# Patient Record
Sex: MALE | Race: WHITE
[De-identification: names, ages, dates, MRNs, and addresses within clinical notes are randomized per-mention and may not be internally consistent; named-entity substitution may affect disease eponyms.]

---

## 2018-02-26 ENCOUNTER — HOSPITAL ENCOUNTER (INPATIENT)
Dept: HOSPITAL 92 - ERS | Age: 73
LOS: 15 days | Discharge: NURSING, REHAB OR LONG-TERM CARE | DRG: 853 | End: 2018-03-13
Attending: INTERNAL MEDICINE | Admitting: INTERNAL MEDICINE
Payer: MEDICARE

## 2018-02-26 VITALS — BODY MASS INDEX: 38.8 KG/M2

## 2018-02-26 DIAGNOSIS — R31.29: ICD-10-CM

## 2018-02-26 DIAGNOSIS — N28.1: ICD-10-CM

## 2018-02-26 DIAGNOSIS — I11.0: ICD-10-CM

## 2018-02-26 DIAGNOSIS — J96.22: ICD-10-CM
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
## 2018-02-26 DIAGNOSIS — I25.10: ICD-10-CM

## 2018-02-26 DIAGNOSIS — J44.0: ICD-10-CM

## 2018-02-26 DIAGNOSIS — K29.60: ICD-10-CM

## 2018-02-26 DIAGNOSIS — I49.8: ICD-10-CM

## 2018-02-26 DIAGNOSIS — J18.9: ICD-10-CM

## 2018-02-26 DIAGNOSIS — E66.01: ICD-10-CM

## 2018-02-26 DIAGNOSIS — Z79.01: ICD-10-CM

## 2018-02-26 DIAGNOSIS — I49.5: ICD-10-CM

## 2018-02-26 DIAGNOSIS — J96.21: ICD-10-CM

## 2018-02-26 DIAGNOSIS — I48.3: ICD-10-CM

## 2018-02-26 DIAGNOSIS — F17.210: ICD-10-CM

## 2018-02-26 DIAGNOSIS — E78.5: ICD-10-CM

## 2018-02-26 DIAGNOSIS — R65.20: ICD-10-CM

## 2018-02-26 DIAGNOSIS — I50.33: ICD-10-CM

## 2018-02-26 DIAGNOSIS — M16.0: ICD-10-CM

## 2018-02-26 DIAGNOSIS — Z95.5: ICD-10-CM

## 2018-02-26 DIAGNOSIS — K25.4: ICD-10-CM

## 2018-02-26 DIAGNOSIS — I95.89: ICD-10-CM

## 2018-02-26 DIAGNOSIS — A41.9: Primary | ICD-10-CM

## 2018-02-26 DIAGNOSIS — G47.33: ICD-10-CM

## 2018-02-26 DIAGNOSIS — Z79.82: ICD-10-CM
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
## 2018-02-26 DIAGNOSIS — Z91.81: ICD-10-CM

## 2018-02-26 DIAGNOSIS — D62: ICD-10-CM

## 2018-02-26 LAB
ALBUMIN SERPL BCG-MCNC: 3.5 G/DL (ref 3.4–4.8)
ALP SERPL-CCNC: 57 U/L (ref 40–150)
ALT SERPL W P-5'-P-CCNC: 36 U/L (ref 8–55)
ANALYZER IN CARDIO: (no result)
ANION GAP SERPL CALC-SCNC: 17 MMOL/L (ref 10–20)
AST SERPL-CCNC: 49 U/L (ref 5–34)
BASE EXCESS STD BLDA CALC-SCNC: 0.4 MEQ/L
BASOPHILS # BLD AUTO: 0 THOU/UL (ref 0–0.2)
BASOPHILS NFR BLD AUTO: 0.1 % (ref 0–1)
BILIRUB SERPL-MCNC: 1.5 MG/DL (ref 0.2–1.2)
BUN SERPL-MCNC: 27 MG/DL (ref 8.4–25.7)
CA-I BLDA-SCNC: 1.2 MMOL/L (ref 1.12–1.3)
CALCIUM SERPL-MCNC: 9 MG/DL (ref 7.8–10.44)
CHLORIDE SERPL-SCNC: 99 MMOL/L (ref 98–107)
CK MB SERPL-MCNC: 4.2 NG/ML (ref 0–6.6)
CK SERPL-CCNC: 330 U/L (ref 30–200)
CO2 SERPL-SCNC: 25 MMOL/L (ref 23–31)
CREAT CL PREDICTED SERPL C-G-VRATE: 0 ML/MIN (ref 70–130)
EOSINOPHIL # BLD AUTO: 0 THOU/UL (ref 0–0.7)
EOSINOPHIL NFR BLD AUTO: 0.2 % (ref 0–10)
GLOBULIN SER CALC-MCNC: 3.3 G/DL (ref 2.4–3.5)
GLUCOSE SERPL-MCNC: 127 MG/DL (ref 80–115)
HCO3 BLDA-SCNC: 26.5 MEQ/L (ref 22–26)
HCT VFR BLDA CALC: 51 % (ref 42–52)
HGB BLD-MCNC: 16.6 G/DL (ref 14–18)
HGB BLDA-MCNC: 16.1 G/DL (ref 14–18)
LYMPHOCYTES # BLD: 0.5 THOU/UL (ref 1.2–3.4)
LYMPHOCYTES NFR BLD AUTO: 5.8 % (ref 21–51)
MCH RBC QN AUTO: 31 PG (ref 27–31)
MCV RBC AUTO: 97.5 FL (ref 80–94)
MONOCYTES # BLD AUTO: 1 THOU/UL (ref 0.11–0.59)
MONOCYTES NFR BLD AUTO: 11.4 % (ref 0–10)
NEUTROPHILS # BLD AUTO: 7.5 THOU/UL (ref 1.4–6.5)
NEUTROPHILS NFR BLD AUTO: 82.6 % (ref 42–75)
O2 A-A PPRESDIFF RESPIRATORY: 222.2 MM[HG] (ref 0–20)
PCO2 BLDA: 47.6 MMHG (ref 35–45)
PH BLDA: 7.36 [PH] (ref 7.35–7.45)
PLATELET # BLD AUTO: 205 THOU/UL (ref 130–400)
PO2 BLDA: 74.8 MMHG (ref 80–100)
POTASSIUM SERPL-SCNC: 4.8 MMOL/L (ref 3.5–5.1)
RBC # BLD AUTO: 5.36 MILL/UL (ref 4.7–6.1)
SODIUM SERPL-SCNC: 136 MMOL/L (ref 136–145)
SPECIMEN DRAWN FROM PATIENT: (no result)
TROPONIN I SERPL DL<=0.01 NG/ML-MCNC: 0.05 NG/ML (ref ?–0.03)
TROPONIN I SERPL DL<=0.01 NG/ML-MCNC: 0.06 NG/ML (ref ?–0.03)
TROPONIN I SERPL DL<=0.01 NG/ML-MCNC: 0.06 NG/ML (ref ?–0.03)
WBC # BLD AUTO: 9 THOU/UL (ref 4.8–10.8)

## 2018-02-26 PROCEDURE — 81001 URINALYSIS AUTO W/SCOPE: CPT

## 2018-02-26 PROCEDURE — 80053 COMPREHEN METABOLIC PANEL: CPT

## 2018-02-26 PROCEDURE — 84443 ASSAY THYROID STIM HORMONE: CPT

## 2018-02-26 PROCEDURE — 86900 BLOOD TYPING SEROLOGIC ABO: CPT

## 2018-02-26 PROCEDURE — 96375 TX/PRO/DX INJ NEW DRUG ADDON: CPT

## 2018-02-26 PROCEDURE — 94640 AIRWAY INHALATION TREATMENT: CPT

## 2018-02-26 PROCEDURE — P9016 RBC LEUKOCYTES REDUCED: HCPCS

## 2018-02-26 PROCEDURE — 82085 ASSAY OF ALDOLASE: CPT

## 2018-02-26 PROCEDURE — 87633 RESP VIRUS 12-25 TARGETS: CPT

## 2018-02-26 PROCEDURE — 93005 ELECTROCARDIOGRAM TRACING: CPT

## 2018-02-26 PROCEDURE — 85730 THROMBOPLASTIN TIME PARTIAL: CPT

## 2018-02-26 PROCEDURE — 82553 CREATINE MB FRACTION: CPT

## 2018-02-26 PROCEDURE — 94003 VENT MGMT INPAT SUBQ DAY: CPT

## 2018-02-26 PROCEDURE — 83880 ASSAY OF NATRIURETIC PEPTIDE: CPT

## 2018-02-26 PROCEDURE — 82805 BLOOD GASES W/O2 SATURATION: CPT

## 2018-02-26 PROCEDURE — 82550 ASSAY OF CK (CPK): CPT

## 2018-02-26 PROCEDURE — 94760 N-INVAS EAR/PLS OXIMETRY 1: CPT

## 2018-02-26 PROCEDURE — 72170 X-RAY EXAM OF PELVIS: CPT

## 2018-02-26 PROCEDURE — C1769 GUIDE WIRE: HCPCS

## 2018-02-26 PROCEDURE — 93970 EXTREMITY STUDY: CPT

## 2018-02-26 PROCEDURE — 36005 INJECTION EXT VENOGRAPHY: CPT

## 2018-02-26 PROCEDURE — C1730 CATH, EP, 19 OR FEW ELECT: HCPCS

## 2018-02-26 PROCEDURE — 36430 TRANSFUSION BLD/BLD COMPNT: CPT

## 2018-02-26 PROCEDURE — C9113 INJ PANTOPRAZOLE SODIUM, VIA: HCPCS

## 2018-02-26 PROCEDURE — 80202 ASSAY OF VANCOMYCIN: CPT

## 2018-02-26 PROCEDURE — 83735 ASSAY OF MAGNESIUM: CPT

## 2018-02-26 PROCEDURE — 96374 THER/PROPH/DIAG INJ IV PUSH: CPT

## 2018-02-26 PROCEDURE — 36415 COLL VENOUS BLD VENIPUNCTURE: CPT

## 2018-02-26 PROCEDURE — 80048 BASIC METABOLIC PNL TOTAL CA: CPT

## 2018-02-26 PROCEDURE — 87899 AGENT NOS ASSAY W/OPTIC: CPT

## 2018-02-26 PROCEDURE — 93010 ELECTROCARDIOGRAM REPORT: CPT

## 2018-02-26 PROCEDURE — 33208 INSRT HEART PM ATRIAL & VENT: CPT

## 2018-02-26 PROCEDURE — 93653 COMPRE EP EVAL TX SVT: CPT

## 2018-02-26 PROCEDURE — 80076 HEPATIC FUNCTION PANEL: CPT

## 2018-02-26 PROCEDURE — C1785 PMKR, DUAL, RATE-RESP: HCPCS

## 2018-02-26 PROCEDURE — 74178 CT ABD&PLV WO CNTR FLWD CNTR: CPT

## 2018-02-26 PROCEDURE — 36416 COLLJ CAPILLARY BLOOD SPEC: CPT

## 2018-02-26 PROCEDURE — 86850 RBC ANTIBODY SCREEN: CPT

## 2018-02-26 PROCEDURE — 76705 ECHO EXAM OF ABDOMEN: CPT

## 2018-02-26 PROCEDURE — 94002 VENT MGMT INPAT INIT DAY: CPT

## 2018-02-26 PROCEDURE — 94660 CPAP INITIATION&MGMT: CPT

## 2018-02-26 PROCEDURE — 71045 X-RAY EXAM CHEST 1 VIEW: CPT

## 2018-02-26 PROCEDURE — 93613 INTRACARDIAC EPHYS 3D MAPG: CPT

## 2018-02-26 PROCEDURE — 75820 VEIN X-RAY ARM/LEG: CPT

## 2018-02-26 PROCEDURE — 87040 BLOOD CULTURE FOR BACTERIA: CPT

## 2018-02-26 PROCEDURE — A4216 STERILE WATER/SALINE, 10 ML: HCPCS

## 2018-02-26 PROCEDURE — C1898 LEAD, PMKR, OTHER THAN TRANS: HCPCS

## 2018-02-26 PROCEDURE — 86677 HELICOBACTER PYLORI ANTIBODY: CPT

## 2018-02-26 PROCEDURE — 87086 URINE CULTURE/COLONY COUNT: CPT

## 2018-02-26 PROCEDURE — 71275 CT ANGIOGRAPHY CHEST: CPT

## 2018-02-26 PROCEDURE — 86901 BLOOD TYPING SEROLOGIC RH(D): CPT

## 2018-02-26 PROCEDURE — 84484 ASSAY OF TROPONIN QUANT: CPT

## 2018-02-26 PROCEDURE — 85610 PROTHROMBIN TIME: CPT

## 2018-02-26 PROCEDURE — 85025 COMPLETE CBC W/AUTO DIFF WBC: CPT

## 2018-02-26 PROCEDURE — 72192 CT PELVIS W/O DYE: CPT

## 2018-02-26 PROCEDURE — 76942 ECHO GUIDE FOR BIOPSY: CPT

## 2018-02-26 PROCEDURE — 84100 ASSAY OF PHOSPHORUS: CPT

## 2018-02-26 PROCEDURE — 93306 TTE W/DOPPLER COMPLETE: CPT

## 2018-02-26 NOTE — CT
CT PELVIS WITHOUT CONTRAST:

 

Date:  02/26/18 

 

HISTORY:  

Severe arthritis in the hips. Fall. 

 

COMPARISON:  

None. 

 

FINDINGS:

There appears to be an abnormal soft tissue density in the inferior pole of the left kidney. There is
 some perinephric stranding. There is also abnormal edema retroperitoneal near the level of the aorti
c bifurcation to the iliac arteries. 

 

Fat-containing left-sided indirect inguinal hernia. Prostate is enlarged. 

 

Bilateral pars interarticular defects are present at L5 with Grade I anterolisthesis approximately 7.
0 mm. Moderate degenerative listhesis between the spinous processes of L4 and L5. Old deformity of th
e coccyx. 

 

There is severe degenerative change of both hip joints with some bridging heterotopic ossification an
d bridging osteophytes, likely limiting range of motion. There is sclerosis of both SI joints indicat
ing degenerative disease. There is also severe degenerative disease of the pubic symphysis. 

 

Mild atrophy of the right iliacus and iliopsoas, likely from tendon injury. 

 

IMPRESSION: 

1.  Abnormal soft tissue density inferior pole left kidney with perinephric stranding. There is also 
abnormal edema which is retroperitoneal near the aortic bifurcation to the iliac arteries. Dedicated 
abdomen and pelvis CT with renal protocol recommended. 

 

2.  Severe degenerative disease of both hips. Right worse than left, with bridging osteophyte formati
on of the right hip likely limiting patient's mobility. There is also severe degenerative disease of 
the left hip, although with only small bridging osteophytes. 

 

3.  Severe degenerative disease pubic symphysis. 

 

4.  Bilateral pars interarticularis defects L5 with Grade I anterolisthesis of approximately 7.0 mm. 


 

5.  Fat-containing left-sided direct inguinal hernia. 

 

6.  Atrophy of the right iliacus and iliopsoas muscles likely due to tendinous injury. 

 

7.  Marked prostatomegaly. 

 

 

POS: [facility]

## 2018-02-26 NOTE — HP
REASON FOR ADMISSION:  Acute respiratory failure with hypoxia, likely viral syndrome, possible pneumo
luis e.

 

HISTORY OF PRESENTING ILLNESS:  The patient gives history of getting progressively weak and congested
 from Saturday.  He states his wife had flu-like illness and thinks that he might have caught up with
 that.  On Saturday, patient fell in the house.  He could not get up as he was feeling very weak.  [facility] ended up calling 911 and EMT came and got him up to the couch.  He took a tablet of Norco and fell
 off the couch again in an hour and EMTs had to be called again.  They have suggested him to go to [facility] emergency room, but patient thought he will wait it out.  This morning as symptoms, especially the 
generalized weakness was getting worse, patient made it to the emergency room.  He states he has angelina
re arthritis in both his hips and uses crutches to walk from last 2 years.  He has not seen an orthop
edic specialist.  No current complaints of chest pain or palpitation.  He has not been able to lay fl
at.  On arrival, patient was brought on BiPAP here.  They have tried to wean him, but he is still on 
100% nonrebreather at present.

 

PAST MEDICAL AND SURGICAL HISTORY:  History of coronary artery disease with 3 stents placed 3 years b
ack by Dr. Fried, dyslipidemia, hypertension, and severe osteoarthritis in both hips.

 

CURRENT MEDICATIONS:  Patient takes carvedilol 6.25 mg twice daily, Plavix 75 mg daily, Norvasc 5 mg 
daily, simvastatin 20 mg p.o. at bedtime, lisinopril 20 mg twice daily, Lasix 20 mg daily, Norco p.r.
n. for pain, and aspirin 325 mg daily.

 

ALLERGIES:  No known drug allergies.

 

PERSONAL HISTORY:  Quit smoking 10 years ago, prior to which has smoked one pack a day for nearly 20 
years.  Does not abuse alcohol or drugs.  Lives with his wife.

 

FAMILY HISTORY:  Mother  of lymphoma and its complications at the age of 62 years.  Father  o
f heart failure and its complications at the age of 82 years.

 

REVIEW OF SYSTEMS:  The following complete review of systems was negative, unless otherwise mentioned
 in the HPI or below:

Constitutional:  Weight loss or gain, ability to conduct usual activities.

Skin:  Rash, itching.

Eyes:  Double vision, pain.

ENT/Mouth:  Nose bleeding, neck stiffness, pain, tenderness.

Cardiovascular:  Palpitations, dyspnea on exertion, orthopnea.

Respiratory:  Shortness of breath, wheezing, cough, hemoptysis, fever or night sweats.

Gastrointestinal:  Poor appetite, abdominal pain, heartburn, nausea, vomiting, constipation, or diarr
hea.

Genitourinary:  Urgency, frequency, dysuria, nocturia.

Musculoskeletal:  Pain, swelling.

Neurologic/Psychiatric:  Anxiety, depression.

Allergy/Immunologic:  Skin rash, bleeding tendency.

 

PHYSICAL EXAMINATION:

GENERAL:  The patient is a 72-year-old male who is currently on a nonrebreather and has orthopnea.

VITAL SIGNS:  Blood pressure 134/56, pulse 78 per minute, respiratory rate 24 per minute, temperature
 99.8 degrees Fahrenheit, saturating 98% on BiPAP on arrival, currently 95% on 100% nonrebreather.

ORAL CAVITY:  Mucous membranes are moist.  No exudates or congestion.

CARDIOVASCULAR SYSTEM:  S1, S2 heard.  Regular rhythm.

RESPIRATORY SYSTEM:  Air entry 1+ bilateral.  Scattered wheezes plus bilaterally.

ABDOMEN:  Soft, bowel sounds heard.  No tenderness, rigidity or guarding.

EXTREMITIES:  His lower extremities are disproportionate.  Patient states his left lower extremity is
 always bigger than the right.  He attributes this to severe osteoarthritis in his hips.  No calf ten
derness.

VASCULAR SYSTEM:  Peripheral pulses 1+ bilateral.  No ischemic ulcerations or gangrene.

CENTRAL NERVOUS SYSTEM:  No gross focal deficits seen.  The patient moves all 4 extremities, although
 he is extremely deconditioned and in fact cannot mobilize himself at present on the [facility].

PSYCHIATRIC SYSTEM:  No hallucinations or delusions.

 

LABORATORY DATA AND IMAGING DATA:  White count of 9, hemoglobin and hematocrit 16 and 52, platelet co
unt 205.  MCV is 97 with 82% neutrophils.  Blood gas done shows pH of 7.36, pCO2 47, pO2 74, bicarbon
ate is 26.  Electrolytes are stable.  BUN 27, creatinine 0.6, glucose 127, total bilirubin 1.5, AST 4
9, and ALT 36.  CK level is 330, CK-MB 4.2, troponin I 0.05.  BNP is 136.  Albumin is 3.5.  Chest x-r
ay done shows cardiomegaly with pulmonary vascular congestion.  EKG done shows bifascicular block wit
h normal sinus rhythm at 80 beats per minute.

 

CLINICAL IMPRESSION AND PLAN:  The patient will be admitted to [facility] for acute respiratory failure wit
h hypoxia.  Possible viral syndrome with his wife having similar symptoms, possible obstructive sleep
 apnea with hypoventilation syndrome.  He will be empirically placed on Levaquin and we will also farhana
ce him on Tamiflu until his viral PCR comes back.  He will also be on Solu-Medrol 40 mg IV q.6 hourly
 along with DuoNebs q.6 hourly.  We will obtain an echo with 2D Doppler for LV function and ultrasoun
d venous Doppler to rule out DVT in his lower extremities.  We will obtain a CT of his pelvis to see 
severity of his osteoarthritis as patient is using crutches and with his body weight of nearly 117 ki
lograms, likely he has been falling for the same reason.  We will confirm the same.  The patient is c
urrently on nonrebreather and we will place him back on BiPAP for now.  We will consult Dr. Charlie loera is on call for Pulmonology.

 

CODE STATUS:  FULL.

## 2018-02-26 NOTE — ULT
BILATERAL LOWER EXTREMITY VENOUS DUPLEX SONOGRAM:

 

History: Bilateral leg pain and swelling. 

 

FINDINGS: 

Each common femoral vein and greater saphenous junction are evaluated along with each femoral, deep f
emoral, popliteal, and posterior tibial vein. There is good color and spectral doppler flow, compress
ion and augmentation. 

 

IMPRESSION: 

No sonographic of DVT within either lower extremity.

 

POS: [facility]

## 2018-02-26 NOTE — RAD
PORTABLE CHEST:

 

HISTORY: 

Dyspnea.  

 

COMPARISON: 

No comparison.

 

FINDINGS: 

The heart is enlarged.  There is mild vascular engorgement.  No focal infiltrate.  Lung bases are poo
rly evaluated on this exam de to portable projection and overlying soft tissue attenuation.  If there
 is concern of pneumonia, recommend upright PA and lateral views of chest.

 

IMPRESSION: 

Cardiomegaly and mild vascular engorgement.

 

POS: [facility]

## 2018-02-26 NOTE — CON
DATE OF CONSULTATION:  2018

 

SERVICE:  Pulmonary Medicine.

 

REASON FOR CONSULTATION:  [facility] patient.

 

HISTORY OF PRESENT ILLNESS:  The patient is a 72-year-old white male with past 
medical history significant for increasing debility over a period of several 
years secondary to osteoarthritis.  He has severe weakness.  He is on multiple 
medications including narcotics.  In this setting, he suffered a mechanical 
fall yesterday.  EMS Services were contacted and brought to his house.  They 
helped him and watched him for a brief period of time.  They assessed him and 
ultimately, he was left at home.  The next day, he took Norco and inadvertently 
fell off his couch.  He was incapable of getting back up.  EMS Services once 
again came to his house and assessed him.  He came to the hospital because of 
the severe weakness.  He also had a little bit of a viral prodrome.  He was 
found to be a touch hypoxemic on presentation.  He was put on BiPAP briefly, 
but I really do not have any labs that supported the need for this.  He is 
quickly deescalated back down to a nonrebreather, Ventimask, and then 3 liters 
nasal cannula, where he is breathing comfortably.  He currently denies any 
fevers, chills, nausea, vomiting or shortness of breath.  He has no chest 
discomfort.  He is having a slight increasing cough for the past 2-3 days, 
productive of white slimy mucus.  He is not having any fevers or sputum of 
color.  He denies any nausea, vomiting, diarrhea, hot, red, swollen joints or 
new rashes.

 

PAST MEDICAL HISTORY:

1.  Coronary artery disease.

2.  Dyslipidemia.

3.  Hypertension.

4.  Osteoarthritis, severe.

 

ALLERGIES:  No known drug allergies.

 

MEDICATIONS:  List of his inpatient medications were reviewed.  No specific 
updates were made at this time.

 

FAMILY HISTORY:  Noncontributory.

 

SOCIAL HISTORY:  He has a greater than 30-pack-year history of smoking.  He 
quit 10 years ago.  He denies any alcohol or illicit drugs.  He currently lives 
with his wife and has no exposure to chemicals, dust, asbestosis or 
tuberculosis.  He does not have any known lung disease.

 

REVIEW OF SYSTEMS:  General, head, ears, eyes, nose, throat, cardiovascular, 
respiratory, GI, , musculoskeletal, neurologic and skin is negative except as 
mentioned in the HPI.

 

PHYSICAL EXAMINATION:

VITAL SIGNS:  Afebrile, pulse 51, respirations 14, saturation 95% on 3 liters 
nasal cannula, blood pressure 148/76.

GENERAL:  The patient is awake and alert.  He has minimal respiratory distress, 
but tells me that he is breathing comfortably.

HEENT:  Normocephalic, atraumatic.  Sclerae are white, conjunctivae pink.  Oral 
and nasal mucosa is moist without lesions.

LUNGS:  Decent air entry bilaterally with no prolonged expiratory phase or 
wheezing.  Dependent crackles are present in the bibasilar regions.

HEART:  Bradycardic.  Regular.

ABDOMEN:  Soft, nontender, nondistended.  Bowel sounds are positive.

MUSCULOSKELETAL:  No cyanosis or clubbing.  There is 1-2+ pitting in the right 
lower extremity and no pitting in the left lower extremity.  Chronic stasis 
changes are present.  There is asymmetry to the size of the calf with the right 
calf being much larger.

 

LABORATORY DATA:  A pH 7.36, pCO2 of 47.6, pO2 of 75 on BiPAP at 12/7 at that 
time with an FiO2 of 50%.  WBC 9.0, hemoglobin 16.6, platelets 205,000.  
Neutrophil count is 83%.  Basic metabolic profile is unremarkable.  Total 
bilirubin 1.5.  Liver function studies are otherwise unremarkable.  , 
troponin 0.064.

 

IMAGIN.  CT of the pelvis demonstrates a renal lesion in the pole of the left kidney 
with perinephric stranding.  There is also a retroperitoneal edema.  
Degenerative changes of both hips are present.  Prostatomegaly is present.

2.  Chest x-ray demonstrates pulmonary vascular congestion.  There is 
cardiomegaly.  Otherwise, there is no acute pleural parenchymal lung disease 
present.

 

ASSESSMENT:

1.  Acute hypoxic respiratory failure, resolving.

2.  Acute on chronic heart failure, suspected.

3.  Debility, chronic.

4.  Osteoarthritis, severe.

 

PLAN:  Agree with ultrasound of the heart and leg.  He is currently stable for 
transition out of the [facility] to the medical unit.  I will deescalate his 
methylprednisolone to once daily dosing.  I will consult physical therapy and 
case management because placement may be an issue moving forward.  He would 
likely benefit from a stay in a rehab center, or skilled nursing facility to 
pursue aggressive physical therapy.  Cough suppressants will be interrupted.  
Otherwise, supportive management will be continued  Pulmonary Critical Care 
will continue to follow while the patient remains in this location, but 
hopefully, he will be able to transition to the floor in short order.

 

70 minutes have been devoted to this patient in various activities.  I 
personally reviewed all imaging studies and laboratory data noted within this 
document.  For greater than fifty percent of this time, I was interacting with 
the patient at the bedside or coordinating care with the care team.  For the 
remainder of the time I was immediately available to the patient in the 
hospital unit.  



ALLIE

## 2018-02-27 LAB
ALBUMIN SERPL BCG-MCNC: 3.2 G/DL (ref 3.4–4.8)
ALP SERPL-CCNC: 68 U/L (ref 40–150)
ALT SERPL W P-5'-P-CCNC: 38 U/L (ref 8–55)
ANALYZER IN CARDIO: (no result)
ANION GAP SERPL CALC-SCNC: 12 MMOL/L (ref 10–20)
AST SERPL-CCNC: 26 U/L (ref 5–34)
BASE EXCESS STD BLDA CALC-SCNC: 3.8 MEQ/L
BILIRUB DIRECT SERPL-MCNC: 1.1 MG/DL (ref 0.1–0.3)
BILIRUB SERPL-MCNC: 1.6 MG/DL (ref 0.2–1.2)
BUN SERPL-MCNC: 28 MG/DL (ref 8.4–25.7)
CA-I BLDA-SCNC: 1.2 MMOL/L (ref 1.12–1.3)
CALCIUM SERPL-MCNC: 8.8 MG/DL (ref 7.8–10.44)
CHLORIDE SERPL-SCNC: 97 MMOL/L (ref 98–107)
CO2 SERPL-SCNC: 30 MMOL/L (ref 23–31)
CREAT CL PREDICTED SERPL C-G-VRATE: 171 ML/MIN (ref 70–130)
GLUCOSE SERPL-MCNC: 135 MG/DL (ref 80–115)
HCO3 BLDA-SCNC: 30.1 MEQ/L (ref 22–26)
HCT VFR BLDA CALC: 52.9 % (ref 42–52)
HGB BLD-MCNC: 14.9 G/DL (ref 14–18)
HGB BLDA-MCNC: 15.9 G/DL (ref 14–18)
MAGNESIUM SERPL-MCNC: 2 MG/DL (ref 1.6–2.6)
MCH RBC QN AUTO: 31.1 PG (ref 27–31)
MCV RBC AUTO: 97.4 FL (ref 80–94)
MDIFF COMPLETE?: YES
O2 A-A PPRESDIFF RESPIRATORY: 162.53 MM[HG] (ref 0–20)
PCO2 BLDA: 51.1 MMHG (ref 35–45)
PH BLDA: 7.39 [PH] (ref 7.35–7.45)
PLATELET # BLD AUTO: 192 THOU/UL (ref 130–400)
PLATELET BLD QL SMEAR: (no result)
PO2 BLDA: 56.8 MMHG (ref 80–100)
POTASSIUM SERPL-SCNC: 4 MMOL/L (ref 3.5–5.1)
RBC # BLD AUTO: 4.81 MILL/UL (ref 4.7–6.1)
SODIUM SERPL-SCNC: 135 MMOL/L (ref 136–145)
SPECIMEN DRAWN FROM PATIENT: (no result)
WBC # BLD AUTO: 9.4 THOU/UL (ref 4.8–10.8)

## 2018-02-27 RX ADMIN — ASPIRIN SCH MG: 81 TABLET ORAL at 09:20

## 2018-02-27 NOTE — PQF
CLINICAL DOCUMENTATION IMPROVEMENT CLARIFICATION FORM:  ICD-10 Updated



PLEASE DO AN ADDENDUM TO THE PROGRESS NOTE WITH ANY DOCUMENTATION UPDATES OR 
ADDITIONS AND CARRY THROUGH TO DC SUMMARY.   THANK YOU.



DATE:   2/27                                                                   
             ATTN:  DR. TARAH MARTIN



Please exercise your independent, professional judgment in responding to the 
clarification form. Clinical indicators are provided on the bottom of this form 
for your review.



Please check appropriate box(s):



BMI > 40 with associated diagnosis of: (check one)

         [ x ] Morbid (Severe) Obesity

         [  ] Overweight 

         [  ] Obesity 

[  ] Other diagnosis ___________

[  ] Unable to determine



For continuity of documentation, please document condition throughout progress 
notes and discharge summary.  Thank You.



BMI < 19   Under weight

19    - 24.9   Healthy

25.0 - 29.9   Slightly Overweight

30.0 - 34.9   Obese

35.0 - 39.9   Severely Obese

40.0 and Over   Morbidly Obese



CLINICAL INDICATORS - SIGNS / SYMPTOMS / LABS



BMI:  40.9



RISKS:

SEVERE DECONDITIONING WITH SEVERE OA B HIPS

RECURRENT FALLS

POSSIBLE OHS



TREATMENTS:

 DIET

ADDITIONAL STAFF FOR TURNING & ADL ASSISTANCE





THANK YOU!  Jannie



(This form is maintained as a part of the permanent medical record)

 2015 [employer].  All Rights Reserved

Jannie Corrales RN, BSN    [email or web address]    Office:  [phone]

                                                              

[facility]

## 2018-02-27 NOTE — PDOC.PN
- Subjective


Encounter Start Date: 02/27/18


Encounter Start Time: 11:40


Subjective: still sob, is not able to bring up sputum


-: wife at bedside


-: is moving all extremities





- Objective


Resuscitation Status: 


 











Resuscitation Status           FULL:Full Resuscitation














MAR Reviewed: Yes


Vital Signs & Weight: 


 Vital Signs (12 hours)











  Temp Pulse Pulse Pulse Resp BP BP


 


 02/27/18 09:24       161/85 H 


 


 02/27/18 08:47    81  74    186/79 H


 


 02/27/18 08:00  98.9 F  78    20  


 


 02/27/18 06:26   55 L    18  


 


 02/27/18 04:00  99.1 F  57 L    20  














  BP BP Pulse Ox Pulse Ox Pulse Ox


 


 02/27/18 09:24     


 


 02/27/18 08:47  193/94 H    90 L  91 L


 


 02/27/18 08:00   180/74 H  91 L  


 


 02/27/18 06:26     


 


 02/27/18 04:00   159/79 H  92 L  








 Weight











Admit Weight                   260 lb


 


Weight                         261 lb 1.6 oz














I&O: 


 











 02/26/18 02/27/18 02/28/18





 06:59 06:59 06:59


 


Intake Total  420 


 


Output Total  380 


 


Balance  40 











Result Diagrams: 


 02/27/18 03:32





 02/27/18 03:32





Phys Exam





- Physical Examination


HEENT: PERRLA, sclera anicteric


Neck: no JVD, supple


Respiratory: no wheezing


rhonchi++


Cardiovascular: RRR, no significant murmur


Gastrointestinal: soft, non-tender, positive bowel sounds


Musculoskeletal: no edema, pulses present


Neurological: non-focal, moves all 4 limbs





Dx/Plan


(1) Acute respiratory failure with hypoxia


Code(s): J96.01 - ACUTE RESPIRATORY FAILURE WITH HYPOXIA   Status: Acute   





(2) COPD exacerbation


Code(s): J44.1 - CHRONIC OBSTRUCTIVE PULMONARY DISEASE W (ACUTE) EXACERBATION   

Status: Acute   





(3) Morbid obesity


Code(s): E66.01 - MORBID (SEVERE) OBESITY DUE TO EXCESS CALORIES   Status: 

Acute   





(4) WILMA (obstructive sleep apnea)


Code(s): G47.33 - OBSTRUCTIVE SLEEP APNEA (ADULT) (PEDIATRIC)   Status: 

Suspected   





(5) Obesity hypoventilation syndrome


Code(s): E66.2 - MORBID (SEVERE) OBESITY WITH ALVEOLAR HYPOVENTILATION   Status

: Suspected   





(6) Osteoarthritis


Code(s): M19.90 - UNSPECIFIED OSTEOARTHRITIS, UNSPECIFIED SITE   Status: 

Chronic   


Qualifiers: 


   Osteoarthritis location: hip   Osteoarthritis type: primary   Laterality: 

bilateral   Qualified Code(s): M16.0 - Bilateral primary osteoarthritis of hip 

  





(7) HTN (hypertension)


Code(s): I10 - ESSENTIAL (PRIMARY) HYPERTENSION   Status: Chronic   


Qualifiers: 


   Hypertension type: essential hypertension   Qualified Code(s): I10 - 

Essential (primary) hypertension   





- Plan


is on levaquin, steroids, nebs


-: will get neuro w/u if he remains deconditioned/aspiration symptoms


-: has severe OA of hips, atrophy of ileopsoas and iliacus as well on CT


-: will likely need rehab eval if he recovers


-: May tx if he is good enough to clear oral/resp secretions if not prefer him





* .


staying in imcu.


is on oral lasix, encourage po intake


will f/u


viral pcr is -ve for resp pathogens





Review of Systems





- Medications/Allergies


Allergies/Adverse Reactions: 


 Allergies











Allergy/AdvReac Type Severity Reaction Status Date / Time


 


No Known Drug Allergies Allergy   Verified 02/26/18 14:53











Medications: 


 Current Medications





Acetaminophen (Tylenol)  650 mg PO Q4H PRN


   PRN Reason: Headache/Fever or Pain


Albuterol/Ipratropium (Duoneb)  3 ml NEB [id number] [facility]


   Last Admin: 02/27/18 06:26 Dose:  3 ml


Aspirin (Ecotrin)  81 mg PO DAILY [facility]


   Last Admin: 02/27/18 09:20 Dose:  81 mg


Carvedilol (Coreg)  6.25 mg PO BID-WM [facility]


   Last Admin: 02/27/18 09:24 Dose:  6.25 mg


Docusate Sodium (Colace)  100 mg PO BID [facility]


   Last Admin: 02/27/18 09:20 Dose:  100 mg


Enoxaparin Sodium (Lovenox)  40 mg SC 0900 [facility]


   Last Admin: 02/27/18 09:19 Dose:  40 mg


Furosemide (Lasix)  40 mg PO DAILY-AC [facility]


   Last Admin: 02/27/18 09:20 Dose:  40 mg


Guaifenesin (Mucinex)  1,200 mg PO Q12HR [facility]


   Last Admin: 02/27/18 09:19 Dose:  1,200 mg


Levofloxacin 500 mg/ Device  100 mls @ 100 mls/hr IVPB 1500 [facility]


   Last Admin: 02/26/18 18:28 Dose:  Not Given


Prednisone (Prednisone)  40 mg PO QAM-WM [facility]


   Stop: 03/02/18 08:01


   Last Admin: 02/27/18 09:20 Dose:  40 mg

## 2018-02-27 NOTE — PRG
DATE OF SERVICE:  02/27/2018

 

SERVICE:  Pulmonary Medicine.

 

INTERVAL HISTORY:  The patient was doing really quite well until this afternoon when he developed a t
achyarrhythmia.  At that point, he had increasing oxygen requirements and was diaphoretic once again.
  He did not have any chest discomfort, but he had increasing mentation issues.  His blood pressure r
emained stable otherwise.  We will repeat an ABG, which was essentially unremarkable.  He ended up go
ing down for an urgent CT of the chest to make sure no pulmonary embolus existed.  Otherwise, there w
as no interval change to his condition.  He is back on BiPAP at this time.  After the tachyarrhythmia
 became a little bit more typical, we repeated an EKG demonstrating characteristic changes consistent
 with atrial flutter.

 

PHYSICAL EXAMINATION:

VITAL SIGNS:  Afebrile, pulse 135, blood pressure 136/95, respirations 35, saturation 97% on BiPAP cu
rrently.

GENERAL:  The patient is somnolent, but wakes up and follows some simple commands with very little st
imulation.

HEENT:  Normocephalic, atraumatic.  Sclerae are white, conjunctivae pink.  Oral and nasal mucosa is m
oist without lesions.

LUNGS:  Decent air entry.  There is a prolonged expiratory phase with a little bit of wheezing and cr
ackles both present.  No rhonchi.

HEART:  Tachycardic.  Regular.

ABDOMEN:  Soft, nontender, nondistended.  Bowel sounds are positive.

MUSCULOSKELETAL:  No cyanosis or clubbing.  There is trace to 1+ pitting in the bilateral lower extre
mities.

GENITOURINARY:  No Sommer.

NEUROLOGIC:  Grossly nonfocal.

 

LABORATORY DATA:  WBC 9.4, hemoglobin 14.9, platelets 192,000.  Band count is up trending to 17%.  PH
 7.39, pCO2 of 51.1, pO2 56 on 40% FiO2 at that time.  Sodium 135, and stable.  Basic metabolic profi
le is otherwise unremarkable.  Total bilirubin 1.6 and stable.  Basic metabolic profile is otherwise 
unremarkable.  TSH is barely below the normal range.  Respiratory virus panel, blood cultures x2 are 
unremarkable.

 

IMAGING:  CTA of the chest demonstrates no evidence of pulmonary embolism.  Patchy areas of mild infi
ltrate throughout each lung, likely represents atelectasis.  Gallbladder sludge is present with dyski
nesis.

 

ASSESSMENT:

1.  Acute hypoxic respiratory failure.

2.  Acute on chronic heart failure, suspected, echo pending.

3.  Atrial flutter.

4.  Osteoarthritis, severe.

5.  Debility, chronic.

 

PLAN:  We will continue our antibiotics.  Laboratories will be repeated tomorrow morning.  He was farhana
abigail on an amiodarone drip and a heparin drip for the time being.  Supportive care with nebulized medi
cations, steroids, and antibiotics will be continued.  Pulmonary and Critical Care will continue to barbara toussaint for the time being.

## 2018-02-28 LAB
ANION GAP SERPL CALC-SCNC: 12 MMOL/L (ref 10–20)
BUN SERPL-MCNC: 30 MG/DL (ref 8.4–25.7)
CALCIUM SERPL-MCNC: 9 MG/DL (ref 7.8–10.44)
CHLORIDE SERPL-SCNC: 97 MMOL/L (ref 98–107)
CO2 SERPL-SCNC: 31 MMOL/L (ref 23–31)
CREAT CL PREDICTED SERPL C-G-VRATE: 162 ML/MIN (ref 70–130)
GLUCOSE SERPL-MCNC: 132 MG/DL (ref 80–115)
HGB BLD-MCNC: 15.9 G/DL (ref 14–18)
MACROCYTES BLD QL SMEAR: (no result) (100X)
MAGNESIUM SERPL-MCNC: 2.1 MG/DL (ref 1.6–2.6)
MCH RBC QN AUTO: 31.3 PG (ref 27–31)
MCV RBC AUTO: 98.4 FL (ref 80–94)
MDIFF COMPLETE?: YES
PLATELET # BLD AUTO: 231 THOU/UL (ref 130–400)
PLATELET BLD QL SMEAR: (no result)
POLYCHROMASIA BLD QL SMEAR: (no result) (100X)
POTASSIUM SERPL-SCNC: 4.3 MMOL/L (ref 3.5–5.1)
RBC # BLD AUTO: 5.1 MILL/UL (ref 4.7–6.1)
SODIUM SERPL-SCNC: 136 MMOL/L (ref 136–145)
WBC # BLD AUTO: 10 THOU/UL (ref 4.8–10.8)

## 2018-02-28 RX ADMIN — PIPERACILLIN SODIUM AND TAZOBACTAM SODIUM SCH MLS: 3; .375 INJECTION, POWDER, LYOPHILIZED, FOR SOLUTION INTRAVENOUS at 18:24

## 2018-02-28 RX ADMIN — VANCOMYCIN HYDROCHLORIDE SCH MLS: 1 INJECTION, POWDER, LYOPHILIZED, FOR SOLUTION INTRAVENOUS at 16:16

## 2018-02-28 RX ADMIN — Medication SCH ML: at 20:52

## 2018-02-28 RX ADMIN — ASPIRIN SCH MG: 81 TABLET ORAL at 09:39

## 2018-02-28 NOTE — PRG
DATE OF SERVICE:  02/28/2018

 

SERVICE:  Pulmonary Medicine.

 

INTERVAL HISTORY:  The patient is doing really well from a respiratory standpoint.  He is breathing c
omfortably.  He was able to convert from his tachyarrhythmia yesterday to normal sinus rhythm.  He ha
s demonstrated good hemodynamic stability.  Otherwise, there has been no interval change to his condi
tion.  He had a CT scan yesterday demonstrating a small infiltrate in the right lower lobe that could
 be consistent with pneumonia.  It started with some air bronchograms, making me think that a small p
atch pneumonia does truly exists.

 

PHYSICAL EXAMINATION:

VITAL SIGNS:  Afebrile with a T-max of 100.0, pulse 71, blood pressure 167/74, respirations 22 and sa
turation 91% on 3 liters nasal cannula.

GENERAL:  The patient is awake and alert in no apparent distress.

LUNGS:  Decreased air entry with a prolonged expiratory phase and wheezing.

HEART:  Normal rate and regular.

ABDOMEN:  Soft, nontender and nondistended.  Bowel sounds are positive.

MUSCULOSKELETAL:  No cyanosis or clubbing.  There is no pitting in the bilateral lower extremities.

NEUROLOGIC:  Grossly nonfocal.

 

LABORATORY DATA:  WBC 10.0, hemoglobin 15.9 and platelets 231,000.  Neutrophil count is 38 and his ba
nd count is increasing to 42%.  PH of 7.39, pCO2 of 51 and pO2 of 56.  Basic metabolic profile is oth
erwise unremarkable.  Magnesium and phosphorus fall within the normal limits.  Blood cultures x2 nerissa
in negative.

 

IMAGING DATA:  CT of the chest demonstrates no evidence of a pulmonary embolism.  There is a small in
filtrate in the right base, likely representative of a consolidation.  Atherosclerosis and sludge of 
the gallbladder is present.

 

ASSESSMENT:

1.  Acute hypoxic respiratory failure.

2.  Acute on chronic diastolic heart failure.

3.  Atrial flutter with rapid ventricular response, returned to sinus rhythm.

4.  Osteoarthritis, severe.

5.  Debility, chronic.

6.  Weakness.

 

DISCUSSION AND PLAN:  Based on my neurologic exam, the patient really does not have significant weakn
ess.  He demonstrates pretty good strength in the bilateral lower and upper extremities.  He has a ve
ry good cough when he wants to.  That being said, the band count continues to increase and he is havi
ng some increasing abdominal discomfort.  I am going to get a right upper quadrant ultrasound and his
 bilirubin was minimally elevated previously.  We will let him use the BiPAP on an as needed basis.  
The patient will remain in the IMCU until we see that white blood cell count stabilizes.  Pulmonary C
ritical Care will continue to follow.

## 2018-02-28 NOTE — CON
DATE OF CONSULTATION:  02/28/2018

 

REASON FOR CONSULTATION:  Atrial fibrillation/flutter.

 

REFERRING PROVIDER:  Dr. Guerra.

 

HISTORY OF PRESENT ILLNESS:  Mr. Herbert is a 72-year-old gentleman, who has had multiple hospitalizatio
ns for weakness and fatigue.  I have discussed the case with Dr. Weston Guerra.  It appears he likely i
s septic.  He had a respiratory distress yesterday and has had significant improvement over the last 
24 hours.  He did develop what appeared to be atrial fibrillation with RVR versus atrial flutter.  He
 is now back in sinus rhythm.

 

Mr. Herbert does not give a history of having previous atrial fibrillation or flutter.  He gives a histo
ry of having CAD, status post stent placement x3 in the past.  Dr. Keon Fried is the primary cardiol
ogist.

 

PAST MEDICAL HISTORY:  CAD, hyperlipidemia, hypertension, osteoarthritis.

 

MEDICATIONS:  Coreg, lisinopril, Plavix, Lasix, simvastatin, Norvasc, Norco, and aspirin.

 

ALLERGIES:  None.

 

SOCIAL HISTORY:  No current tobacco or alcohol use.

 

FAMILY HISTORY:  Negative for CAD.

 

REVIEW OF SYSTEMS:  Ten-point review of systems reviewed, and as above, otherwise negative.

 

PHYSICAL EXAMINATION:

VITAL SIGNS:  Blood pressure 134/85, pulse 60, temperature 97.6.

GENERAL:  He does appear older than stated age.

The patient appears his stated age.

NEUROLOGIC:  The patient is alert and oriented times 3 with no focal neurologic deficits.

HEENT:  Sclerae without icterus.  Mouth has moist mucous membranes with normal pallor.

NECK:  No JVD.  Carotid upstroke brisk.  No bruits bilaterally.

LUNGS:  Clear to auscultation with unlabored respirations.

BACK:  No scoliosis or kyphosis.

CARDIAC:  Regular rate and rhythm with normal S1 and S2.  No S3 or S4 noted.  No significant rubs, mu
rmurs, thrills, or gallops noted throughout the precordium.  PMI is not displaced.  There is no oswaldo
ternal heave.

ABDOMEN:  Soft, nontender, nondistended.  No peritoneal signs present.

No hepatosplenomegaly.  No abnormal striae.

EXTREMITIES:  2+ femoral and 2+ dorsalis pedis pulses.  No cyanosis, clubbing, or edema.

SKIN:  No gross abnormalities.

 

PERTINENT LABORATORY DATA:  Hemoglobin 15.9, creatinine 0.71, potassium 4.3.

 

IMPRESSION:

1.  Atrial fibrillation/flutter.

2.  Sepsis.

3.  Sedentary.

4.  Osteoarthritis.

5.  Coronary artery disease.

 

RECOMMENDATIONS:  Mr. Herbert had a brief episode of atrial fibrillation/flutter.  This may be related t
o underlying sepsis.  Continue to observe.  We will stop amiodarone therapy.  We will add low-dose be
ta blocker therapy.  Continue carvedilol 6.25 one p.o. b.i.d.  If ablation is needed, amiodarone will
 increase the threshold of inducibility.  His last LVEF was felt to be normal with diastolic dysfunct
ion.

## 2018-02-28 NOTE — CON
DATE OF CONSULTATION:  02/28/2018

 

ORTHOPEDIC CONSULTATION

 

REQUESTING PHYSICIAN:  Giovanni Fry M.D.

 

CONSULTING PHYSICIAN:  Christ Roldan M.D.

 

REASON FOR CONSULTATION:  Bilateral hip arthropathy.

 

BRIEF CLINICAL HISTORY:  Vinicius is a 72-year-old white male who has been admitted to the Intensive Ca
re Unit for pulmonary complications.  CT examination was obtained of the chest, abdomen and pelvis wh
ich demonstrated severe end-stage bilateral osteoarthritis of both hips.  This is what we are consult
ing.  Clinically, patient is in poor health as he is not intubated, but he is struggling from a pulmo
nary standpoint.

 

PHYSICAL EXAMINATION:  Both lower extremities are atrophic.  Chronic dermal stasis changes noted bila
terally.  Internal and external rotation of the femur is very difficult and extremely painful for the
 patient.  He sits in a semi recumbent position with flexed hips externally rotated and severe rigidi
ty.

 

IMAGING STUDIES:

1.  AP pelvis pending.

2.  CT examination pelvis demonstrates severe osteoarthritic changes of bilateral hips.  Subchondral 
cystic changes found.  There is also a question of an old fracture on the right femoral neck which ap
pears to be impacted by my read.

 

IMPRESSION:

1.  Severe end-stage bilateral bicompartmental hip osteoarthritis.

2.  Acute on chronic diastolic heart failure with hypoxia.

3.  Depression, poor surgical candidate presently.

 

PLAN:

1.  AP pelvis will be obtained.

2.  No surgical intervention is warranted at this point, but ultimately should he make significant im
provement in his pulmonary status, consideration for hip arthroplasty might be considered.

## 2018-02-28 NOTE — EKG
Test Reason : 

Blood Pressure : ***/*** mmHG

Vent. Rate : 133 BPM     Atrial Rate : 122 BPM

   P-R Int : 000 ms          QRS Dur : 128 ms

    QT Int : 354 ms       P-R-T Axes : 000 -85 029 degrees

   QTc Int : 526 ms

 

Wide QRS tachycardia regular rhythm

Left axis deviation

Right bundle branch block

Possible Lateral infarct , age undetermined

Inferior infarct , age undetermined

Abnormal ECG

When compared with ECG of 26-FEB-2018 09:11, (Unconfirmed)

Wide QRS tachycardia has replaced Sinus rhythm

Vent. rate has increased BY  53 BPM

Confirmed by DR. LIZY SPARKS (3) on 2/28/2018 6:51:42 PM

 

Referred By:  MITCHELL           Confirmed By:DR. LIZY SPARKS

## 2018-02-28 NOTE — EKG
Test Reason : 

Blood Pressure : ***/*** mmHG

Vent. Rate : 101 BPM     Atrial Rate : 277 BPM

   P-R Int : 000 ms          QRS Dur : 134 ms

    QT Int : 362 ms       P-R-T Axes : 014 -82 044 degrees

   QTc Int : 469 ms

 

Atrial flutter with variable A-V block

Left axis deviation

Right bundle branch block

Possible Lateral infarct , age undetermined

Abnormal ECG

When compared with ECG of 27-FEB-2018 16:34, (Unconfirmed)

Atrial flutter has replaced Wide QRS tachycardia

Confirmed by DR. LIZY SPARKS (3) on 2/28/2018 6:54:59 PM

 

Referred By:  MITCHELL           Confirmed By:DR. LIZY SPARKS

## 2018-02-28 NOTE — PDOC.PN
- Subjective


Encounter Start Date: 02/28/18


Encounter Start Time: 11:25


Subjective: lethargic, responds well to verbal stimuli


-: is moving all extremities


-: wife at bedside





- Objective


Resuscitation Status: 


 











Resuscitation Status           FULL:Full Resuscitation














MAR Reviewed: Yes


Vital Signs & Weight: 


 Vital Signs (12 hours)











  Temp Pulse Resp BP BP Pulse Ox


 


 02/28/18 12:21  97.6 F  68  20   134/85  92 L


 


 02/28/18 10:54  99.5 F  71  22 H    91 L


 


 02/28/18 09:40     167/74 H  


 


 02/28/18 04:59   71    


 


 02/28/18 04:00  99.6 F  70  27 H   160/89 H  95


 


 02/28/18 02:23       96


 


 02/28/18 01:04   58 L  26 H    96








 Weight











Admit Weight                   260 lb


 


Weight                         257 lb 1 oz














I&O: 


 











 02/27/18 02/28/18 03/01/18





 06:59 06:59 06:59


 


Intake Total 420 590 


 


Output Total 380 250 


 


Balance 40 340 











Result Diagrams: 


 02/28/18 03:46





 02/28/18 03:46


Additional Labs: 


 Accuchecks











  02/27/18





  14:39


 


POC Glucose  138 H














Phys Exam





- Physical Examination


HEENT: PERRLA, sclera anicteric


Neck: no JVD, supple


Respiratory: no wheezing


rhonchi++


Cardiovascular: RRR, no significant murmur


Gastrointestinal: soft, non-tender, positive bowel sounds


Musculoskeletal: no edema, pulses present


Neurological: non-focal, moves all 4 limbs


strength 5/5 in all 4 extremities, reflexes are present in all extremities


lethargic but oriented well





Dx/Plan


(1) Acute respiratory failure with hypoxia


Code(s): J96.01 - ACUTE RESPIRATORY FAILURE WITH HYPOXIA   Status: Acute   





(2) COPD exacerbation


Code(s): J44.1 - CHRONIC OBSTRUCTIVE PULMONARY DISEASE W (ACUTE) EXACERBATION   

Status: Acute   





(3) Morbid obesity


Code(s): E66.01 - MORBID (SEVERE) OBESITY DUE TO EXCESS CALORIES   Status: 

Acute   





(4) WILMA (obstructive sleep apnea)


Code(s): G47.33 - OBSTRUCTIVE SLEEP APNEA (ADULT) (PEDIATRIC)   Status: 

Suspected   





(5) Obesity hypoventilation syndrome


Code(s): E66.2 - MORBID (SEVERE) OBESITY WITH ALVEOLAR HYPOVENTILATION   Status

: Suspected   





(6) Osteoarthritis


Code(s): M19.90 - UNSPECIFIED OSTEOARTHRITIS, UNSPECIFIED SITE   Status: 

Chronic   


Qualifiers: 


   Osteoarthritis location: hip   Osteoarthritis type: primary   Laterality: 

bilateral   Qualified Code(s): M16.0 - Bilateral primary osteoarthritis of hip 

  





(7) HTN (hypertension)


Code(s): I10 - ESSENTIAL (PRIMARY) HYPERTENSION   Status: Chronic   


Qualifiers: 


   Hypertension type: essential hypertension   Qualified Code(s): I10 - 

Essential (primary) hypertension   





(8) Atrial flutter


Code(s): I48.92 - UNSPECIFIED ATRIAL FLUTTER   Status: Acute   


Qualifiers: 


   Atrial flutter type: typical   Qualified Code(s): I48.3 - Typical atrial 

flutter   


Comment: in sinus rhythm   





- Plan


is on amiodarone, a.flutter converted to sinus


-: on zosyn, asp, coreg, nebs, prednisone


-: has 42% bands with normal wbc


-: PT to work with him, oob to chair today and amb as tolerated


-: bipap prn, will get ortho opinion for?intra-articular shot for amb purposes





* .








Review of Systems





- Medications/Allergies


Allergies/Adverse Reactions: 


 Allergies











Allergy/AdvReac Type Severity Reaction Status Date / Time


 


No Known Drug Allergies Allergy   Verified 02/26/18 14:53











Medications: 


 Current Medications





Acetaminophen (Tylenol)  650 mg PO Q4H PRN


   PRN Reason: Headache/Fever or Pain


Albuterol/Ipratropium (Duoneb)  3 ml NEB [id number] [facility]


   Last Admin: 02/28/18 12:11 Dose:  3 ml


Aspirin (Ecotrin)  81 mg PO DAILY [facility]


   Last Admin: 02/28/18 09:39 Dose:  81 mg


Carvedilol (Coreg)  6.25 mg PO BID-WM [facility]


   Last Admin: 02/28/18 09:40 Dose:  6.25 mg


Docusate Sodium (Colace)  100 mg PO BID [facility]


   Last Admin: 02/28/18 09:40 Dose:  100 mg


Enoxaparin Sodium (Lovenox)  40 mg SC 0900 [facility]


   Last Admin: 02/28/18 09:39 Dose:  40 mg


Furosemide (Lasix)  40 mg PO DAILY-AC [facility]


   Last Admin: 02/28/18 06:34 Dose:  40 mg


Guaifenesin (Mucinex)  1,200 mg PO Q12HR [facility]


   Last Admin: 02/28/18 09:40 Dose:  1,200 mg


Amiodarone HCl 450 mg/Miscellaneous Medication 1 each/ Dextrose/Water  259 mls 

@ 0 mls/hr IVPB INF [facility]; As Directed


   PRN Reason: Protocol


   Last Admin: 02/27/18 19:50 Dose:  259 mls


Amiodarone HCl 450 mg/Miscellaneous Medication 1 each/ Dextrose/Water  259 mls 

@ 0 mls/hr IVPB INF [facility]; As Directed


   PRN Reason: Protocol


   Last Admin: 02/28/18 06:34 Dose:  259 mls


Piperacillin Sod/Tazobactam Sod 3.375 gm/ Miscellaneous Medication 1 each/ 

Sodium Chloride  100 mls @ 200 mls/hr IVPB Q6HR [facility]


Vancomycin HCl 1.5 gm/Miscellaneous Medication 1 each/ Sodium Chloride  300 mls 

@ 200 mls/hr IVPB 0200,1400 [facility]


Miscellaneous Medication (Pharmacy To Dose)  1 each IVPB ASDIR [facility]


Prednisone (Prednisone)  40 mg PO QAM- [facility]


   Stop: 03/02/18 08:01


   Last Admin: 02/28/18 09:40 Dose:  40 mg


Sodium Chloride (Flush - Normal Saline)  10 ml IVF Q12HR [facility]


Sodium Chloride (Flush - Normal Saline)  10 ml IVF PRN PRN


   PRN Reason: Saline Flush

## 2018-02-28 NOTE — RAD
AP PELVIS ONE VIEW

2/28/18

 

HISTORY: 

Hip pain.

 

FINDINGS:  

There is complete loss of joint space of each hip with chronic remodeling and depression of the right
 femoral head and right acetabulum and bulky osteophytosis and subchondral sclerosis. 

 

On the left, there is moderate degree of osteophytosis with mild remodeling of the acetabulum and fem
oral head. Prominent osteophytosis and subchondral sclerosis. Sacral ala and pelvic rings are intact.
 

 

IMPRESSION:  

Severe osteoarthritic changes of each hip, right worse than left. 

 

POS: AMBROSIO

## 2018-03-01 LAB
ANION GAP SERPL CALC-SCNC: 10 MMOL/L (ref 10–20)
BASOPHILS # BLD AUTO: 0 THOU/UL (ref 0–0.2)
BASOPHILS NFR BLD AUTO: 0 % (ref 0–1)
BUN SERPL-MCNC: 36 MG/DL (ref 8.4–25.7)
CALCIUM SERPL-MCNC: 8.8 MG/DL (ref 7.8–10.44)
CHLORIDE SERPL-SCNC: 96 MMOL/L (ref 98–107)
CK SERPL-CCNC: 24 U/L (ref 30–200)
CO2 SERPL-SCNC: 34 MMOL/L (ref 23–31)
CREAT CL PREDICTED SERPL C-G-VRATE: 145 ML/MIN (ref 70–130)
EOSINOPHIL # BLD AUTO: 0 THOU/UL (ref 0–0.7)
EOSINOPHIL NFR BLD AUTO: 0.1 % (ref 0–10)
GLUCOSE SERPL-MCNC: 134 MG/DL (ref 80–115)
HGB BLD-MCNC: 14.9 G/DL (ref 14–18)
LYMPHOCYTES # BLD: 0.7 THOU/UL (ref 1.2–3.4)
LYMPHOCYTES NFR BLD AUTO: 6 % (ref 21–51)
MCH RBC QN AUTO: 31.9 PG (ref 27–31)
MCV RBC AUTO: 97.3 FL (ref 80–94)
MONOCYTES # BLD AUTO: 1.1 THOU/UL (ref 0.11–0.59)
MONOCYTES NFR BLD AUTO: 10.5 % (ref 0–10)
NEUTROPHILS # BLD AUTO: 9.1 THOU/UL (ref 1.4–6.5)
NEUTROPHILS NFR BLD AUTO: 83.3 % (ref 42–75)
PLATELET # BLD AUTO: 238 THOU/UL (ref 130–400)
POTASSIUM SERPL-SCNC: 4.3 MMOL/L (ref 3.5–5.1)
RBC # BLD AUTO: 4.67 MILL/UL (ref 4.7–6.1)
S PNEUM AG UR QL: NEGATIVE
SODIUM SERPL-SCNC: 136 MMOL/L (ref 136–145)
VANCOMYCIN TROUGH SERPL-MCNC: 10.6 UG/ML
WBC # BLD AUTO: 10.9 THOU/UL (ref 4.8–10.8)

## 2018-03-01 RX ADMIN — PIPERACILLIN SODIUM AND TAZOBACTAM SODIUM SCH MLS: 3; .375 INJECTION, POWDER, LYOPHILIZED, FOR SOLUTION INTRAVENOUS at 18:05

## 2018-03-01 RX ADMIN — VANCOMYCIN HYDROCHLORIDE SCH MLS: 1 INJECTION, POWDER, LYOPHILIZED, FOR SOLUTION INTRAVENOUS at 21:13

## 2018-03-01 RX ADMIN — PIPERACILLIN SODIUM AND TAZOBACTAM SODIUM SCH MLS: 3; .375 INJECTION, POWDER, LYOPHILIZED, FOR SOLUTION INTRAVENOUS at 11:23

## 2018-03-01 RX ADMIN — Medication PRN ML: at 00:20

## 2018-03-01 RX ADMIN — PIPERACILLIN SODIUM AND TAZOBACTAM SODIUM SCH MLS: 3; .375 INJECTION, POWDER, LYOPHILIZED, FOR SOLUTION INTRAVENOUS at 00:21

## 2018-03-01 RX ADMIN — PIPERACILLIN SODIUM AND TAZOBACTAM SODIUM SCH MLS: 3; .375 INJECTION, POWDER, LYOPHILIZED, FOR SOLUTION INTRAVENOUS at 06:36

## 2018-03-01 RX ADMIN — Medication SCH ML: at 21:09

## 2018-03-01 RX ADMIN — Medication PRN ML: at 06:38

## 2018-03-01 RX ADMIN — VANCOMYCIN HYDROCHLORIDE SCH: 1 INJECTION, POWDER, LYOPHILIZED, FOR SOLUTION INTRAVENOUS at 14:10

## 2018-03-01 RX ADMIN — Medication SCH ML: at 08:51

## 2018-03-01 RX ADMIN — VANCOMYCIN HYDROCHLORIDE SCH MLS: 1 INJECTION, POWDER, LYOPHILIZED, FOR SOLUTION INTRAVENOUS at 02:42

## 2018-03-01 RX ADMIN — Medication PRN ML: at 02:43

## 2018-03-01 RX ADMIN — VANCOMYCIN HYDROCHLORIDE SCH MLS: 1 INJECTION, POWDER, LYOPHILIZED, FOR SOLUTION INTRAVENOUS at 13:46

## 2018-03-01 RX ADMIN — ASPIRIN SCH MG: 81 TABLET ORAL at 08:50

## 2018-03-01 NOTE — PDOC.PN
- Subjective


Encounter Start Date: 03/01/18


Encounter Start Time: 12:00


Subjective: no new complaints


-: wife at bedside


-: has not mobilized much





- Objective


Resuscitation Status: 


 











Resuscitation Status           FULL:Full Resuscitation














MAR Reviewed: Yes


Vital Signs & Weight: 


 Vital Signs (12 hours)











  Temp Pulse Pulse Pulse Pulse Resp BP


 


 03/01/18 13:43   70     20 


 


 03/01/18 12:04   72     21 H 


 


 03/01/18 10:53  98.1 F  72     24 H 


 


 03/01/18 09:10    72  69  70  


 


 03/01/18 08:52  98.6 F  72     20 


 


 03/01/18 08:50        157/79 H


 


 03/01/18 08:20       


 


 03/01/18 08:19   70     20 


 


 03/01/18 07:15  98.6 F  72     23 H 


 


 03/01/18 04:00  98.4 F  72     18 














  BP BP BP BP Pulse Ox Pulse Ox Pulse Ox


 


 03/01/18 13:43      93 L  


 


 03/01/18 12:04     131/77  89 L  


 


 03/01/18 10:53     135/82  93 L  


 


 03/01/18 09:10  140/83  164/87 H  147/76 H    89 L  92 L


 


 03/01/18 08:52      90 L  


 


 03/01/18 08:50       


 


 03/01/18 08:20      90 L  


 


 03/01/18 08:19      90 L  


 


 03/01/18 07:15     120/70  91 L  


 


 03/01/18 04:00     130/71  93 L  














  Pulse Ox Pulse Ox


 


 03/01/18 13:43  


 


 03/01/18 12:04  


 


 03/01/18 10:53  


 


 03/01/18 09:10  77 L  82 L


 


 03/01/18 08:52  


 


 03/01/18 08:50  


 


 03/01/18 08:20  


 


 03/01/18 08:19  


 


 03/01/18 07:15  


 


 03/01/18 04:00  








 Weight











Admit Weight                   260 lb


 


Weight                         255 lb 7 oz














I&O: 


 











 02/28/18 03/01/18 03/02/18





 06:59 06:59 06:59


 


Intake Total 590 1624 


 


Output Total 250 1000 225


 


Balance 340 624 -225











Result Diagrams: 


 03/01/18 03:48





 03/01/18 03:48





Phys Exam





- Physical Examination


HEENT: PERRLA, moist MMs


Neck: no JVD, supple


Respiratory: no wheezing, no rales


rhonchi+


Cardiovascular: RRR, no significant murmur


Gastrointestinal: soft, non-tender, positive bowel sounds


Musculoskeletal: no edema, pulses present


Neurological: non-focal, moves all 4 limbs


Psychiatric: A&O x 3





Dx/Plan


(1) Acute respiratory failure with hypoxia


Code(s): J96.01 - ACUTE RESPIRATORY FAILURE WITH HYPOXIA   Status: Acute   





(2) COPD exacerbation


Code(s): J44.1 - CHRONIC OBSTRUCTIVE PULMONARY DISEASE W (ACUTE) EXACERBATION   

Status: Acute   





(3) Morbid obesity


Code(s): E66.01 - MORBID (SEVERE) OBESITY DUE TO EXCESS CALORIES   Status: 

Acute   





(4) WILMA (obstructive sleep apnea)


Code(s): G47.33 - OBSTRUCTIVE SLEEP APNEA (ADULT) (PEDIATRIC)   Status: 

Suspected   





(5) Obesity hypoventilation syndrome


Code(s): E66.2 - MORBID (SEVERE) OBESITY WITH ALVEOLAR HYPOVENTILATION   Status

: Suspected   





(6) Osteoarthritis


Code(s): M19.90 - UNSPECIFIED OSTEOARTHRITIS, UNSPECIFIED SITE   Status: 

Chronic   


Qualifiers: 


   Osteoarthritis location: hip   Osteoarthritis type: primary   Laterality: 

bilateral   Qualified Code(s): M16.0 - Bilateral primary osteoarthritis of hip 

  





(7) HTN (hypertension)


Code(s): I10 - ESSENTIAL (PRIMARY) HYPERTENSION   Status: Chronic   


Qualifiers: 


   Hypertension type: essential hypertension   Qualified Code(s): I10 - 

Essential (primary) hypertension   





(8) Atrial flutter


Code(s): I48.92 - UNSPECIFIED ATRIAL FLUTTER   Status: Acute   


Qualifiers: 


   Atrial flutter type: typical   Qualified Code(s): I48.3 - Typical atrial 

flutter   


Comment: in sinus rhythm   





- Plan


is on vanc and zosyn


-: prednisone, lasix daily


-: asp, coreg, off amiodarone HR around 60's in sinus


-: PT to mobilize oob to chair and amb as tolerated


-: counselled reg ambulation to get better





* .








Review of Systems





- Medications/Allergies


Allergies/Adverse Reactions: 


 Allergies











Allergy/AdvReac Type Severity Reaction Status Date / Time


 


No Known Drug Allergies Allergy   Verified 02/26/18 14:53











Medications: 


 Current Medications





Acetaminophen (Tylenol)  650 mg PO Q4H PRN


   PRN Reason: Headache/Fever or Pain


Albuterol/Ipratropium (Duoneb)  3 ml NEB [id number] [facility]


   Last Admin: 03/01/18 13:43 Dose:  3 ml


Aspirin (Ecotrin)  81 mg PO DAILY [facility]


   Last Admin: 03/01/18 08:50 Dose:  81 mg


Carvedilol (Coreg)  6.25 mg PO BID-[facility]


   Last Admin: 03/01/18 08:50 Dose:  6.25 mg


Docusate Sodium (Colace)  100 mg PO BID [facility]


   Last Admin: 03/01/18 08:49 Dose:  100 mg


Enoxaparin Sodium (Lovenox)  40 mg SC 0900 [facility]


   Last Admin: 03/01/18 08:48 Dose:  40 mg


Guaifenesin (Mucinex)  1,200 mg PO Q12HR [facility]


   Last Admin: 03/01/18 08:49 Dose:  1,200 mg


Piperacillin Sod/Tazobactam Sod 3.375 gm/ Miscellaneous Medication 1 each/ 

Sodium Chloride  100 mls @ 200 mls/hr IVPB Q6HR [facility]


   Last Admin: 03/01/18 11:23 Dose:  100 mls


Vancomycin HCl 1.5 gm/Miscellaneous Medication 1 each/ Sodium Chloride  300 mls 

@ 200 mls/hr IVPB Q8HR [facility]


   Last Admin: 03/01/18 14:10 Dose:  Not Given


Miscellaneous Medication (Pharmacy To Dose)  1 each IVPB ASDIR [facility]


Prednisone (Prednisone)  40 mg PO QAM-[facility]


   Stop: 03/02/18 08:01


   Last Admin: 03/01/18 08:49 Dose:  40 mg


Sodium Chloride (Flush - Normal Saline)  10 ml IVF Q12HR [facility]


   Last Admin: 03/01/18 08:51 Dose:  10 ml


Sodium Chloride (Flush - Normal Saline)  10 ml IVF PRN PRN


   PRN Reason: Saline Flush


   Last Admin: 03/01/18 06:38 Dose:  10 ml

## 2018-03-01 NOTE — PRG
DATE OF SERVICE:  03/01/2018

 

SERVICE:  Pulmonary Medicine.

 

INTERVAL HISTORY:  The patient is doing fine from a cardiovascular and 
respiratory standpoint.  He is actually looking quite good today.  He denies 
any current shortness of breath, nausea, vomiting.  Otherwise, there has been 
no interval change to his condition.  He had no events overnight.  He remains 
in sinus rhythm.

 

PHYSICAL EXAMINATION:

VITAL SIGNS:  Afebrile, pulse 72, blood pressure 131/77, respirations 21, 
saturation 93% on 3 liters nasal cannula.

GENERAL:  The patient is awake, alert, no apparent distress.

LUNGS:  Decent air entry.  There is a slightly prolonged expiratory phase with 
polyphonic wheezing.  The crackles on the right have improved a little bit.

HEART:  Normal rate, regular.

ABDOMEN:  Soft, nontender, nondistended.  Bowel sounds are positive.

MUSCULOSKELETAL:  No cyanosis or clubbing.  No pitting in the bilateral lower 
extremities.

NEUROLOGIC:  Grossly nonfocal.

 

LABORATORY DATA:  WBC 10.9, hemoglobin 14.9, platelets 238,000.  Neutrophil 
count is 83%.  Bicarbonate 34.  Basic metabolic profile is otherwise 
unremarkable.  CK has returned to normal at 24.

 

IMAGING:  Ultrasound of the abdomen demonstrates hypoechoic mass in the mid 
portion of the right kidney.  Sludge in the gallbladder.

 

ASSESSMENT:

1.  Acute hypoxic respiratory failure.

2.  Acute on chronic diastolic heart failure.

3.  Atrial flutter with rapid ventricular response, returned to sinus rhythm.

4.  Severe sepsis.

5.  Community-acquired pneumonia.

6.  Debility, chronic.

 

DISCUSSION AND PLAN:  At this point, he has actually turned the corner very 
nicely.  We will continue mobilization efforts moving forward.  He is stable 
from a respiratory standpoint for transition out of the IMCU to the regular 
telemetry unit.  Pulmonary Critical Care will continue to follow along for the 
time being.  We will continue our Zosyn and vancomycin.  I will add strep urine 
antigen.  Once he gets tuned up from a respiratory standpoint, he will 
hopefully be a candidate for bilateral hip surgeries as this is the thing that 
is causing his progressive debility over the last several years.

 

ALLIE

## 2018-03-01 NOTE — PQF
CLINICAL DOCUMENTATION IMPROVEMENT CLARIFICATION FORM:  ICD-10 Updated



PLEASE DO AN ADDENDUM TO THE PROGRESS NOTE WITH ANY DOCUMENTATION UPDATES OR 
ADDITIONS AND CARRY THROUGH TO DC SUMMARY.   THANK YOU.



DATE:   3/1                                                                    
           ATTN:  DR. TARAH MARTIN



Please exercise your independent, professional judgment in responding to the 
clarification form. Clinical indicators are provided on the bottom of this form 
for your review.



Please check appropriate box(s):



DIASTOLIC HEART FAILURE:



 ACUITY

             [  ] Acute          [  ] Acute on Chronic          [  ] Chronic



[ x ] Other diagnosis _No chf__________

[  ] Unable to determine



For continuity of documentation, please document condition throughout progress 
notes and discharge summary.  Thank You.



CLINICAL INDICATORS - SIGNS / SYMPTOMS / LABS



PULMONOLOGY CONSULT DOCUMENTATION 2/26:  ASSESSMENT:  2.  ACUTE ON CHRONIC 
HEART FAILURE, SUSPECTED

PULMONOLOGY PN 2/27:  ASSESSMENT:  2.  ACUTE ON CHRONIC HF, SUSPECTED, ECHO 
PENDING

PULMONOLOGY PN 2/28:  ASSESSMENT:  2.  ACUTE ON CHRONIC DIASTOLIC HF



BNP:  136  (2/26, ADMIT)         TREATED IN ER W/IV LASIX             CXR 2/26:
  MILD VASCULAR ENGORGEMENT

ECHO 2/27:  EF 50-55%, L [facility] MILDLY DILATED



RISKS:

ACUTE RESPIRATORY FAILURE W/HYPOXIA

CAD S/P STENTS

HX OF CHF

HTN

AFIB/FLUTTER W/O HISTORY



TREATMENTS:

IMCU MONITORING

PO LASIX (2/26 - PRESENT)

SUPPLEMENTAL OXYGEN/BIPAP (2/26 - PRESENT)

CARDIOLOGY CONSULT (2/28)

ECHO (2/27)





THANK YOU!  Jannie



(This form is maintained as a part of the permanent medical record)

 2015 [employer].  All Rights Reserved

Jannie Corrales RN, BSN    [email or web address]    Office:  [phone]

                                                              

[facility]

## 2018-03-01 NOTE — ULT
RIGHT UPPER QUADRANT ULTRASOUND: 

 

Date: 3-1-18 

 

Comparison: Right upper quadrant pain and nausea. 

 

FINDINGS: 

Liver is at the upper limits of normal in size but otherwise normal in appearance. 

 

Pancreas is completely obscured by bowel gas.

 

Right kidney measures 13 cm in length. There is a hypoechoic mass like structure in the mid portion o
f the right kidney which measures 3.6 cm. Further evaluation with CT abdomen is recommended. 

 

There is increased echogenic material within the gallbladder lumen suggesting sludge. There is no gal
lbladder calculus, gallbladder wall thickening, or pericholecystic fluid. Common duct is normal in ca
liber measuring 0.3 cm. 

 

The visualized portions of the IVC demonstrate a normal sonographic appearance. 

 

IMPRESSION: 

1. Hypoechoic mass mid portion right kidney. CT scan of the abdomen is recommended for further evalua
tion and characterization. 

2. Sludge within the gallbladder lumen, but no gallbladder calculus is seen. The common duct is shahriar
l in caliber. 

 

Code T

 

POS: [facility]

## 2018-03-02 LAB
ANION GAP SERPL CALC-SCNC: 13 MMOL/L (ref 10–20)
BUN SERPL-MCNC: 30 MG/DL (ref 8.4–25.7)
CALCIUM SERPL-MCNC: 8.6 MG/DL (ref 7.8–10.44)
CHLORIDE SERPL-SCNC: 97 MMOL/L (ref 98–107)
CO2 SERPL-SCNC: 32 MMOL/L (ref 23–31)
CREAT CL PREDICTED SERPL C-G-VRATE: 152 ML/MIN (ref 70–130)
GLUCOSE SERPL-MCNC: 130 MG/DL (ref 80–115)
HGB BLD-MCNC: 15.5 G/DL (ref 14–18)
MCH RBC QN AUTO: 31.4 PG (ref 27–31)
MCV RBC AUTO: 97.1 FL (ref 80–94)
MDIFF COMPLETE?: YES
PLATELET # BLD AUTO: 264 THOU/UL (ref 130–400)
POTASSIUM SERPL-SCNC: 5.5 MMOL/L (ref 3.5–5.1)
RBC # BLD AUTO: 4.92 MILL/UL (ref 4.7–6.1)
SODIUM SERPL-SCNC: 136 MMOL/L (ref 136–145)
VANCOMYCIN TROUGH SERPL-MCNC: 17.1 UG/ML
WBC # BLD AUTO: 15.4 THOU/UL (ref 4.8–10.8)

## 2018-03-02 RX ADMIN — Medication PRN ML: at 00:19

## 2018-03-02 RX ADMIN — VANCOMYCIN HYDROCHLORIDE SCH MLS: 1 INJECTION, POWDER, LYOPHILIZED, FOR SOLUTION INTRAVENOUS at 22:22

## 2018-03-02 RX ADMIN — Medication SCH: at 20:29

## 2018-03-02 RX ADMIN — VANCOMYCIN HYDROCHLORIDE SCH MLS: 1 INJECTION, POWDER, LYOPHILIZED, FOR SOLUTION INTRAVENOUS at 13:51

## 2018-03-02 RX ADMIN — Medication SCH ML: at 09:14

## 2018-03-02 RX ADMIN — VANCOMYCIN HYDROCHLORIDE SCH MLS: 1 INJECTION, POWDER, LYOPHILIZED, FOR SOLUTION INTRAVENOUS at 06:30

## 2018-03-02 RX ADMIN — PIPERACILLIN SODIUM AND TAZOBACTAM SODIUM SCH MLS: 3; .375 INJECTION, POWDER, LYOPHILIZED, FOR SOLUTION INTRAVENOUS at 17:30

## 2018-03-02 RX ADMIN — ASPIRIN SCH MG: 81 TABLET ORAL at 09:13

## 2018-03-02 RX ADMIN — PIPERACILLIN SODIUM AND TAZOBACTAM SODIUM SCH MLS: 3; .375 INJECTION, POWDER, LYOPHILIZED, FOR SOLUTION INTRAVENOUS at 05:34

## 2018-03-02 RX ADMIN — PIPERACILLIN SODIUM AND TAZOBACTAM SODIUM SCH MLS: 3; .375 INJECTION, POWDER, LYOPHILIZED, FOR SOLUTION INTRAVENOUS at 00:18

## 2018-03-02 RX ADMIN — PIPERACILLIN SODIUM AND TAZOBACTAM SODIUM SCH MLS: 3; .375 INJECTION, POWDER, LYOPHILIZED, FOR SOLUTION INTRAVENOUS at 12:10

## 2018-03-02 NOTE — PRG
DATE OF SERVICE:  03/02/2018

 

SUBJECTIVE:  Mr. Herbert status is unchanged.  He states he is feeling well.  No current complaints.  No
 chest pain or pressure noted.

 

PHYSICAL EXAMINATION:

VITAL SIGNS:  Blood pressure 142/94, pulse 71, and temperature 98.2.

LUNGS:  Rhonchi and rales bilaterally.  Mildly labored.

GENERAL:  Obese, appears older than stated age.

HEART:  Regular rate and rhythm, no new murmurs.

ABDOMEN:  Distended, obese.

EXTREMITIES:  2+ pitting edema.

 

PERTINENT LABORATORY DATA:  Hemoglobin 15.5 and creatinine 0.74.

 

IMPRESSION:

1.  Atrial tachycardia versus atrial flutter.

2.  Sepsis.

3.  Pneumonia.

 

RECOMMENDATIONS:  EKG to assess atrial tachycardia versus atrial flutter.  His atrial flutter may nee
d anticoagulation therapy.  Dr. Tang is not available today and was not available yesterday.  If appe
ars to be more consistent with atrial flutter, we will consult with Dr. Tang for possible ablation.  
Otherwise, continue antibiotic therapy.

## 2018-03-02 NOTE — PRG
DATE OF SERVICE:  03/02/2018

 

SERVICE:  Pulmonary Medicine.

 

INTERVAL HISTORY:  The patient is doing outstanding from a respiratory 
standpoint.  He denies any current shortness of breath, fevers or chills.  He 
does have mild respiratory distress when I look at him.  That being said, he 
indicates he is breathing comfortably.  He has had no overnight events.  He 
tolerated a little bit of p.o. last night and had a small bowel movement today.
  Otherwise, there has been no interval change to his condition.

 

OBJECTIVE:

VITAL SIGNS:  Afebrile, pulse 71, blood pressure 142/94, respirations 23, 
saturation 92% on 3 liters nasal cannula.

GENERAL:  The patient is awake and alert.  He is in mild respiratory distress.

LUNGS:  Reduced air entry with a slightly prolonged expiratory phase and 
polyphonic wheezing as well as dependent crackles.  No rhonchi are appreciated.

HEART:  Normal rate, regular.

ABDOMEN:  Soft.  Distended.  Bowel sounds are present.  No rebound or guarding 
is identified.

MUSCULOSKELETAL:  No cyanosis or clubbing.  There is no pitting in the 
bilateral lower extremities.  There are chronic stasis changes present.

GENITOURINARY:  No Sommer.

NEUROLOGIC:  Grossly nonfocal.  He continues to demonstrate fairly decent 
strength.

 

LABORATORY DATA:  .4, hemoglobin 15.5, platelets were 264,000.  The 
neutrophil count has improved dramatically with a down trending band count to 13
%.  The white blood cell count is trending up, but the patient looks better, 
his band count is improving, so I actually view this as a positive thing.  
Potassium 5.5, chloride 97, bicarbonate 32.  Creatinine 0.74.  Basic metabolic 
profile is otherwise unremarkable.  CK 24.  Vancomycin trough 17.1.  Urine 
strep legionella antigens are unremarkable.  Blood cultures x2 and respiratory 
virus culture are negative.

 

ASSESSMENT:

1.  Acute hypoxic respiratory failure.

2.  Acute on chronic diastolic heart failure, currently euvolemic.

3.  Atrial flutter with rapid ventricular response, return in normal sinus 
rhythm.

4.  Severe sepsis.

5.  Community-acquired pneumonia.

6.  Osteoarthritis of the hips, severe.

7.  Debility secondary to osteoarthritis.

8.  Renal mass.

 

DISCUSSION AND PLAN:  The patient continues to move in the right direction, 
albeit slowly.  I am not certain whether or not he has enough functional 
reserve to get through this hospital stay without needing mechanical 
ventilation.  I believe he is on the dry side at this point.  We are going to 
introduce a little bit of half normal saline to supplement his poor p.o. 
intake.  As his p.o. intake improves, we will likely back off on the fluids.  
He can stay in the IMCU for the time being.  If he gets tuned up a little bit 
more, we need to pursue bilateral hemiarthroplasty as soon as is reasonable.  
For the time being, empiric antibiotics will be continued.

 

ALLIE

## 2018-03-02 NOTE — PDOC.PN
- Subjective


Encounter Start Date: 03/02/18


Encounter Start Time: 12:15


Subjective: no new complaints


-: wife at bedside


-: has not amb or gotten up yet





- Objective


Resuscitation Status: 


 











Resuscitation Status           FULL:Full Resuscitation














MAR Reviewed: Yes


Vital Signs & Weight: 


 Vital Signs (12 hours)











  Temp Pulse Resp BP BP Pulse Ox


 


 03/02/18 11:02  98.2 F  71  23 H   142/94 H  92 L


 


 03/02/18 09:11     122/82  


 


 03/02/18 08:11  98.2 F  72  26 H    87 L


 


 03/02/18 07:33  98.2 F  72  26 H   144/82 H  93 L


 


 03/02/18 04:00  98.4 F  72  18   165/84 H  94 L








 Weight











Admit Weight                   260 lb


 


Weight                         259 lb














I&O: 


 











 03/01/18 03/02/18 03/03/18





 06:59 06:59 06:59


 


Intake Total 1624 3440 480


 


Output Total 1000 1475 175


 


Balance 624 1965 305











Result Diagrams: 


 03/02/18 04:33





 03/02/18 04:33





Phys Exam





- Physical Examination


HEENT: PERRLA, moist MMs


Neck: no JVD, supple


Respiratory: no wheezing, no rales


rhonchi++


Cardiovascular: RRR, no significant murmur


Gastrointestinal: soft, non-tender, positive bowel sounds


Musculoskeletal: no edema, pulses present


Neurological: non-focal, moves all 4 limbs


Psychiatric: A&O x 3





Dx/Plan


(1) Acute respiratory failure with hypoxia


Code(s): J96.01 - ACUTE RESPIRATORY FAILURE WITH HYPOXIA   Status: Acute   





(2) Morbid obesity


Code(s): E66.01 - MORBID (SEVERE) OBESITY DUE TO EXCESS CALORIES   Status: 

Acute   





(3) WILMA (obstructive sleep apnea)


Code(s): G47.33 - OBSTRUCTIVE SLEEP APNEA (ADULT) (PEDIATRIC)   Status: 

Suspected   





(4) Obesity hypoventilation syndrome


Code(s): E66.2 - MORBID (SEVERE) OBESITY WITH ALVEOLAR HYPOVENTILATION   Status

: Suspected   





(5) Osteoarthritis


Code(s): M19.90 - UNSPECIFIED OSTEOARTHRITIS, UNSPECIFIED SITE   Status: 

Chronic   


Qualifiers: 


   Osteoarthritis location: hip   Osteoarthritis type: primary   Laterality: 

bilateral   Qualified Code(s): M16.0 - Bilateral primary osteoarthritis of hip 

  





(6) HTN (hypertension)


Code(s): I10 - ESSENTIAL (PRIMARY) HYPERTENSION   Status: Chronic   


Qualifiers: 


   Hypertension type: essential hypertension   Qualified Code(s): I10 - 

Essential (primary) hypertension   





(7) Atrial flutter


Code(s): I48.92 - UNSPECIFIED ATRIAL FLUTTER   Status: Acute   


Qualifiers: 


   Atrial flutter type: typical   Qualified Code(s): I48.3 - Typical atrial 

flutter   


Comment: in sinus rhythm   





(8) PNA (pneumonia)


Code(s): J18.9 - PNEUMONIA, UNSPECIFIED ORGANISM   Status: Acute   Comment: cap

   





(9) Sepsis


Code(s): A41.9 - SEPSIS, UNSPECIFIED ORGANISM   Status: Acute   


Qualifiers: 


   Sepsis type: sepsis due to unspecified organism   Qualified Code(s): A41.9 - 

Sepsis, unspecified organism   





(10) Acute exacerbation of CHF (congestive heart failure)


Code(s): I50.9 - HEART FAILURE, UNSPECIFIED   Status: Acute   


Qualifiers: 


   Heart failure type: diastolic   Qualified Code(s): I50.33 - Acute on chronic 

diastolic (congestive) heart failure   


Comment: ef of 50%   





- Plan


is on zosyn and vanc


-: nebs, off steroids from today


-: is severely deconditioned and has not gotten up


-: suggest chest physio?


-: dc plan is to snf if he remains deconditioned and doesnot make any effort t





* .


-owards amb or atleast sit in a chair.


I have d/w wife and have counselled him to make good effort with PT to mobilize


Hip replacements when resp wise he is stable.


Watch for renal function, electrolytes.








Review of Systems





- Medications/Allergies


Allergies/Adverse Reactions: 


 Allergies











Allergy/AdvReac Type Severity Reaction Status Date / Time


 


No Known Drug Allergies Allergy   Verified 02/26/18 14:53











Medications: 


 Current Medications





Acetaminophen (Tylenol)  650 mg PO Q4H PRN


   PRN Reason: Headache/Fever or Pain


Albuterol/Ipratropium (Duoneb)  3 ml NEB [id number] [facility]


   Last Admin: 03/02/18 13:36 Dose:  3 ml


Aspirin (Ecotrin)  81 mg PO DAILY [facility]


   Last Admin: 03/02/18 09:13 Dose:  81 mg


Carvedilol (Coreg)  6.25 mg PO BID-WM [facility]


   Last Admin: 03/02/18 09:11 Dose:  6.25 mg


Docusate Sodium (Colace)  100 mg PO BID [facility]


   Last Admin: 03/02/18 09:11 Dose:  100 mg


Enoxaparin Sodium (Lovenox)  40 mg SC 0900 [facility]


   Last Admin: 03/02/18 09:11 Dose:  40 mg


Guaifenesin (Mucinex)  1,200 mg PO Q12HR [facility]


   Last Admin: 03/02/18 09:11 Dose:  1,200 mg


Piperacillin Sod/Tazobactam Sod 3.375 gm/ Miscellaneous Medication 1 each/ 

Sodium Chloride  100 mls @ 200 mls/hr IVPB Q6HR [facility]


   Last Admin: 03/02/18 12:10 Dose:  100 mls


Vancomycin HCl 1.5 gm/Miscellaneous Medication 1 each/ Sodium Chloride  300 mls 

@ 200 mls/hr IVPB Q8HR [facility]


   Last Admin: 03/02/18 13:51 Dose:  300 mls


Miscellaneous Medication (Pharmacy To Dose)  1 each IVPB ASDIR [facility]


Sodium Chloride (Flush - Normal Saline)  10 ml IVF Q12HR [facility]


   Last Admin: 03/02/18 09:14 Dose:  10 ml


Sodium Chloride (Flush - Normal Saline)  10 ml IVF PRN PRN


   PRN Reason: Saline Flush


   Last Admin: 03/02/18 00:19 Dose:  10 ml

## 2018-03-03 LAB
ANION GAP SERPL CALC-SCNC: 10 MMOL/L (ref 10–20)
BUN SERPL-MCNC: 25 MG/DL (ref 8.4–25.7)
CALCIUM SERPL-MCNC: 8.4 MG/DL (ref 7.8–10.44)
CHLORIDE SERPL-SCNC: 96 MMOL/L (ref 98–107)
CO2 SERPL-SCNC: 37 MMOL/L (ref 23–31)
CREAT CL PREDICTED SERPL C-G-VRATE: 170 ML/MIN (ref 70–130)
CRYSTAL-AUWI FLAG: 0.8 (ref 0–15)
GLUCOSE SERPL-MCNC: 99 MG/DL (ref 80–115)
HEV IGM SER QL: 3.2 (ref 0–7.99)
HGB BLD-MCNC: 14.9 G/DL (ref 14–18)
HYALINE CASTS #/AREA URNS LPF: (no result) LPF
MCH RBC QN AUTO: 32.4 PG (ref 27–31)
MCV RBC AUTO: 100 FL (ref 80–94)
MDIFF COMPLETE?: YES
PATHC CAST-AUWI FLAG: 0.13 (ref 0–2.49)
PLATELET # BLD AUTO: 243 THOU/UL (ref 130–400)
POTASSIUM SERPL-SCNC: 5.1 MMOL/L (ref 3.5–5.1)
RBC # BLD AUTO: 4.59 MILL/UL (ref 4.7–6.1)
RBC UR QL AUTO: (no result) HPF (ref 0–3)
SODIUM SERPL-SCNC: 138 MMOL/L (ref 136–145)
SP GR UR STRIP: 1.03 (ref 1–1.04)
SPERM-AUWI FLAG: 0 (ref 0–9.9)
WBC # BLD AUTO: 14.1 THOU/UL (ref 4.8–10.8)
WBC UR QL AUTO: (no result) HPF (ref 0–3)
YEAST-AUWI FLAG: 30 (ref 0–25)

## 2018-03-03 RX ADMIN — VANCOMYCIN HYDROCHLORIDE SCH MLS: 1 INJECTION, POWDER, LYOPHILIZED, FOR SOLUTION INTRAVENOUS at 14:03

## 2018-03-03 RX ADMIN — PIPERACILLIN SODIUM AND TAZOBACTAM SODIUM SCH MLS: 3; .375 INJECTION, POWDER, LYOPHILIZED, FOR SOLUTION INTRAVENOUS at 00:52

## 2018-03-03 RX ADMIN — Medication SCH: at 07:44

## 2018-03-03 RX ADMIN — PIPERACILLIN SODIUM AND TAZOBACTAM SODIUM SCH MLS: 3; .375 INJECTION, POWDER, LYOPHILIZED, FOR SOLUTION INTRAVENOUS at 05:48

## 2018-03-03 RX ADMIN — Medication PRN ML: at 21:32

## 2018-03-03 RX ADMIN — PIPERACILLIN SODIUM AND TAZOBACTAM SODIUM SCH MLS: 3; .375 INJECTION, POWDER, LYOPHILIZED, FOR SOLUTION INTRAVENOUS at 12:01

## 2018-03-03 RX ADMIN — VANCOMYCIN HYDROCHLORIDE SCH MLS: 1 INJECTION, POWDER, LYOPHILIZED, FOR SOLUTION INTRAVENOUS at 06:31

## 2018-03-03 RX ADMIN — Medication SCH ML: at 20:47

## 2018-03-03 RX ADMIN — VANCOMYCIN HYDROCHLORIDE SCH MLS: 1 INJECTION, POWDER, LYOPHILIZED, FOR SOLUTION INTRAVENOUS at 21:32

## 2018-03-03 RX ADMIN — ASPIRIN SCH MG: 81 TABLET ORAL at 07:44

## 2018-03-03 RX ADMIN — PIPERACILLIN SODIUM AND TAZOBACTAM SODIUM SCH MLS: 3; .375 INJECTION, POWDER, LYOPHILIZED, FOR SOLUTION INTRAVENOUS at 17:38

## 2018-03-03 NOTE — CON
INPATIENT  CONSULTATION NOTE

 

DATE OF CONSULTATION:  03/03/2018

 

REFERRING PHYSICIAN:  Hospitalist.

 

REASON FOR CONSULTATION:  Right 3.6 cm hypoechoic lesion, incidentally found on 
a right upper quadrant  ultrasound.

 

HISTORY OF PRESENT ILLNESS:  Mr. Herbert is a 72-year-old male who was admitted on 
02/26 due to respiratory failure.  The patient lives in a private residence 
with his wife, history of wife having flu-like illness, patient has sedentary 
lifestyle due to severe osteoarthritis of his hips.  He normally ambulates with 
crutches.  There is a history of fall, EMS was called.  The patient was 
subsequently found to have hypoxic respiratory failure, currently in step down 
unit.  He has seen Cardiology, Orthopedic Surgery, intensivist.  He has a 
history of coronary artery disease, status post stent.  CT of the pelvis  with 
severe osteoarthritis of the hips.  Given his current admission with  
respiratory failure, he is deemed a poor surgical candidate and currently on  
observation.  He is also seeing Dr. Arce due to AFib/flutter, which was 
thought to be related to admitting respiratory failure, sepsis.  He denies 
dysuria or gross hematuria.  Does relate 20-pack-year smoking history, quit 10 
years ago.  He states that his urinary flow is adequate; however, has urinary 
frequency every 3-4 hours.  This continues throughout the night.  Denies 
history of sexually transmitted diseases , no family history of prostate cancer 
or prior urologic assessment.  CT of the pelvis obtained a few days ago 
demonstrated a soft tissue mass at the left lower pole, exophytic; however, 
this is suboptimally visualized as it was a CT of the pelvis, and has  not 
addressed.  CT of the chest demonstrates no evidence of pulmonary embolus, 
however possible gallbladder sludge. This prompted right upper quadrant 
ultrasound which demonstrated  incidental hypoechoic right renal lesion.  
Subsequently, Urology was consulted for evaluation.  The patient currently in 
step down IMCU, continues to have hypoxia, 87% on 3 liters.  He is afebrile.

 

PAST MEDICAL HISTORY:  Coronary artery disease, hyperlipidemia, hypertension, 
severe bilateral osteoarthritis of his hips, morbid obesity and history of 
tobacco abuse, quit.

 

PAST SURGICAL HISTORY:  Denies surgery.

 

CURRENT MEDICATIONS:  Include Tylenol, albuterol, baby aspirin, carvedilol, 
Colace, Lovenox, Mucinex, Zosyn and vancomycin.

 

ALLERGIES:  No known drug allergies.

 

SOCIAL HISTORY:  A 20-pack-year smoking history, quit 10 years ago.  Denies 
illicit drug use or alcohol.  Lives with his wife.

 

FAMILY HISTORY:  Positive for lymphoma and heart failure.

 

REVIEW OF SYSTEMS:  Ten-point review of systems as above, otherwise 
noncontributory.

 

PHYSICAL EXAMINATION:

GENERAL:  The patient appears to be deconditioned, somewhat difficult to arouse
; however, per nursing staff, this is at baseline.  Appears to be in no acute 
distress once aroused.

HEENT:  Grossly Unremarkable; conversational dyspnea

HEART:  Regular rate.

LUNGS:  Coarse breath sounds bilaterally.

ABDOMEN:  Morbidly obese.  There is a small umbilical hernia.  There is no 
rigidity, no rebound, no suprapubic tenderness.

GENITOURINARY:  His penis is buried due to morbid obesity; however, his glans 
penis is easily visualized, foreskin retracts easily with manual retraction.  
Testes are descended with no evidence of testicular mass.  No scrotal edema.  
MILLI demonstrates enlarged prostate, volume greater than 50 grams.  No palpable 
nodule, fluctuance or induration.

EXTREMITIES:  Gross venous stasis changes, chronic edema.  No calf tenderness.

 

PERTINENT LABS:  White count on admission 9, currently 14; hemoglobin 14; 
platelets 243 and 79 segs.  BUN 25 and creatinine 0.6.  Blood culture is 
negative.  No urinalysis has been obtained on this admission.

 

PERTINENT IMAGING:  CT of the pelvis without contrast dated 02/26/2018, which I 
have reviewed myself.  There is a left lower pole exophytic soft tissue 
density.  This is suboptimally evaluated as just the lower portion of the 
kidney was imaged.  A fat containing left inguinal hernia and prostatic 
enlargement.  Per my review,  Volume estimated to be approximately 90 grams  
prostatic calcification, with no evidence of intravesical median lobe.  Bladder 
itself was grossly unremarkable with no significant distention.  Severe DJD 
bilateral hips, right greater than left.  Severe DJD of the pubic symphysis.  
Bilateral L5 grade I anterolisthesis.

 

CT of the angio chest; no evidence of pulmonary embolus, patchy infiltrate of 
the lungs, atelectasis, atherosclerosis, bilateral sludge and chronic 
gallbladder dyskinesis.

 

Bilateral lower extremity venous duplex, no evidence of DVT.

 

Renal ultrasound on 03/01/2018, demonstrates no evidence of right 
hydronephrosis.  There is a 3.6 cm hypoechoic mass-like structure in the mid 
right pole.  Recommend CT, sludge of the gallbladder lumen.  No gallbladder 
stone is seen.

 

IMPRESSION AND PLAN:  Mr. Herbert is a 72-year-old male with history of:

1.  Coronary artery disease.

2.  Morbid obesity.

3.  Hyperlipidemia.

4.  Severe bilateral osteoarthritis of the hips.

2.  Decreased mobility, deconditioned status.  Currently, admitted for 
respiratory failure/sepsis.

5.  Urologic consultation obtained due to incidental renal mass is 
indeterminate.  I recommend CT with and without IV contrast.  Differential 
diagnosis reviewed with the patient.

6.  Significant benign prostatic hypertrophy on CT of the pelvis.  Recommend 
medical treatment with Avodart and Flomax.  As he does demonstrate some 
irritative symptoms from large prostate, Flomax and Avodart are initiated.

7.  Midstream UA C&S to be obtained.  Further recommendations pending 
definitive imaging.

 

MTDD

## 2018-03-03 NOTE — PDOC.EVN
Event Note





- Event Note


Event Note: 





Patient was noticed to have a renal mass on ultrasound..


We'll consult UROLOY.

## 2018-03-03 NOTE — PDOC.PN
- Subjective


Encounter Start Date: 03/03/18


Encounter Start Time: 22:00





Expresses no complaint.


Feels better.





- Objective


Resuscitation Status: 


 











Resuscitation Status           FULL:Full Resuscitation














Vital Signs & Weight: 


 Vital Signs (12 hours)











  Temp Pulse Resp BP BP Pulse Ox


 


 03/03/18 08:19       90 L


 


 03/03/18 08:16   73  20   


 


 03/03/18 07:42     154/76 H  


 


 03/03/18 07:05  98.9 F  74  22 H   150/81 H  91 L


 


 03/03/18 04:02  98.2 F  73  18   153/86 H  94 L


 


 03/03/18 01:08   73  20    95


 


 03/02/18 23:55  97.2 F L  72  17   148/80 H  98








 Weight











Admit Weight                   260 lb


 


Weight                         260 lb 14.4 oz














I&O: 


 











 03/02/18 03/03/18 03/04/18





 06:59 06:59 06:59


 


Intake Total 3440 2490 


 


Output Total 1475 175 


 


Balance 1965 2315 











Result Diagrams: 


 03/03/18 04:04





 03/03/18 04:04





Phys Exam





- Physical Examination


Constitutional: NAD


HEENT: sclera anicteric


Neck: no JVD


Respiratory: wheezing present


Cardiovascular: RRR


Gastrointestinal: soft, non-tender


Musculoskeletal: edema present (+skin changes of venous insuffisiency.)


Neurological: moves all 4 limbs


Psychiatric: normal affect, A&O x 3





Dx/Plan


(1) Acute exacerbation of CHF (congestive heart failure)


Code(s): I50.9 - HEART FAILURE, UNSPECIFIED   Status: Acute   


Qualifiers: 


   Heart failure type: diastolic   Qualified Code(s): I50.33 - Acute on chronic 

diastolic (congestive) heart failure   


Plan: 


Continue current management.


Comment: ef of 50%   





(2) Acute respiratory failure with hypoxia


Code(s): J96.01 - ACUTE RESPIRATORY FAILURE WITH HYPOXIA   Status: Acute   


Plan: 


f/u with cardiology.


Comment: improving.   





(3) Atrial flutter


Code(s): I48.92 - UNSPECIFIED ATRIAL FLUTTER   Status: Acute   


Qualifiers: 


   Atrial flutter type: typical   Qualified Code(s): I48.3 - Typical atrial 

flutter   


Plan: 


as per cardiology..


Anticoagulation per cardiology..


Comment: in sinus rhythm   





(4) Morbid obesity


Code(s): E66.01 - MORBID (SEVERE) OBESITY DUE TO EXCESS CALORIES   Status: 

Acute   





(5) HTN (hypertension)


Code(s): I10 - ESSENTIAL (PRIMARY) HYPERTENSION   Status: Chronic   


Qualifiers: 


   Hypertension type: essential hypertension   Qualified Code(s): I10 - 

Essential (primary) hypertension   


Plan: 


Monitor BP.


Continue current med. for now.








(6) Osteoarthritis


Code(s): M19.90 - UNSPECIFIED OSTEOARTHRITIS, UNSPECIFIED SITE   Status: 

Chronic   


Qualifiers: 


   Osteoarthritis location: hip   Osteoarthritis type: primary   Laterality: 

bilateral   Qualified Code(s): M16.0 - Bilateral primary osteoarthritis of hip 

  





(7) WILMA (obstructive sleep apnea)


Code(s): G47.33 - OBSTRUCTIVE SLEEP APNEA (ADULT) (PEDIATRIC)   Status: 

Suspected   





- Plan


Continue current therapy.


-: F/u with consultants.





* .

## 2018-03-03 NOTE — PRG
DATE OF SERVICE:  03/03/2018

 

SERVICE:  Pulmonary Medicine.

 

INTERVAL HISTORY:  The patient is actually doing okay from a respiratory standpoint.  I asked him how
 he fell today and he suggested to me that he actually feels a little bit better.  He is still somnol
ent.  That being said, he is talking in short sentences today and has less conversational dyspnea.  ESTEBAN medel has a little less paradoxical movement of his abdomen with his breathing.  Otherwise, there has bee
n not much change in his clinical situation.

 

OBJECTIVE:

VITAL SIGNS:  Afebrile, pulse 74, blood pressure 159/91, respirations 20, saturation 91% on 3 liters 
nasal cannula.

GENERAL:  The patient is awake and alert.  He is a little somnolent.  He will fall back to sleep with
 no stimulation after 10 seconds.

HEENT:  Normocephalic, atraumatic.  Sclerae are white, conjunctivae pink.  Oral mucosa is moist witho
ut lesions.

LUNGS:  Decent air entry.  There is a minimally prolonged expiratory phase.  I do not appreciate whee
zing, but crackles are present throughout bilateral lung fields.

HEART:  Normal rate, regular.

ABDOMEN:  Soft, nontender.  It is distended, but there is no rebound or guarding.  Bowel sounds are a
ctive.

GENITOURINARY:  Sommer catheter in place.

NEUROLOGIC:  Grossly nonfocal.  He demonstrates decent strength throughout.

 

LABORATORY DATA:  WBC 14.1, hemoglobin 14.9, platelets 243,000.  Neutrophil count is 79% and the band
 count continues to improve to 8%.  Basic metabolic profile is otherwise unremarkable.  His bicarbona
te is up trending to 37, chloride 96 and stable, sodium 138.  Urinalysis is unremarkable.  Respirator
y virus panel and blood culture x2 are unremarkable.

 

IMAGING:  CT of the abdomen and pelvis demonstrates bilateral renal cysts.  There is diverticulosis i
n left inguinal hernia.  The previously noted hypoechoic foci of the kidney likely represent cystic c
hanges of both kidneys.  There is no hydronephrosis present.

 

ASSESSMENT:

1.  Acute hypoxic respiratory failure.

2.  Acute on chronic diastolic heart failure.

3.  Atrial flutter with rapid ventricular response, return in normal sinus rhythm.

4.  Severe sepsis.

5.  Community-acquired pneumonia.

6.  Osteoarthritis of the hips, bilateral.

7.  Debility secondary to hip arthritis.

8.  Renal cysts.

 

DISCUSSION AND PLAN:  The patient continues to move in the correct direction.  I am not certain wheth
er or not he has got enough functional status to make it through this thing without intubation.  He d
oes look fairly tired, but indicates to me that he is feeling a little improved.  As such, we will co
ntinue supportive care to best of our ability.  If he becomes increasingly somnolent and poorly respo
nsive, ABG and likely intubation will occur.  I do think that he can get through this acute illness. 
 Once he does, he will likely require bilateral hip replacement surgeries.  The renal masses that melody medel previously suggested by ultrasound, we are likely cystic and do not require additional follow up.  
Neurology is following, however.  He will remain in the IMCU until he more clearly turns the corner o
r decompensates.

## 2018-03-03 NOTE — EKG
Test Reason : DYSPNEA

Blood Pressure : ***/*** mmHG

Vent. Rate : 080 BPM     Atrial Rate : 080 BPM

   P-R Int : 176 ms          QRS Dur : 134 ms

    QT Int : 396 ms       P-R-T Axes : 000 -83 042 degrees

   QTc Int : 456 ms

 

Normal sinus rhythm

Right bundle branch block

Left anterior fascicular block

*** Bifascicular block ***

Possible Lateral infarct , age undetermined

Abnormal ECG

 

Confirmed by TIFFANIE DRAKE ([id number]),  DEVENDRA DOTY ([id number]) on 3/3/2018 3:32:42 PM

 

Referred By:             Confirmed By:TIFFANIE DRAKE

## 2018-03-03 NOTE — CT
CT OF THE ABDOMEN AND PELVIS WITHOUT AND WITH CONTRAST:

 

HISTORY: 

Hematuria.  Evaluate for renal mass.

 

TECHNIQUE: 

Multiple contiguous axial images were obtained in a CT of the abdomen and pelvis without and with IV 
contrast.  Postcontrast images were obtained in nephrographic and excretory phases.  Coronal reformat
s were performed.

 

FINDINGS: 

There are nonenhancing cysts along the posterior aspect of both kidneys.  The largest on the left zita
sures 7.0 cm in size.  The largest on the right measures 4.5 cm in size.  No calcifications are seen 
in either kidney.  No suspicious renal masses are identified.  There is no evidence of hydronephrosis
.  Both ureters are unremarkable.  No focal abnormality is seen in the urinary bladder.  The prostate
 is enlarged and contains calcifications.

 

The liver, gallbladder, adrenal glands, spleen, and pancreas are unremarkable.  No free air, free flu
id, or stranding changes are seen in the abdomen or pelvis.

 

There are scattered diverticula in the colon.  The small bowel is unremarkable.  Atherosclerotic calc
ifications are seen in the aorta.  No abdominal or pelvic lymphadenopathy are seen.

 

There is a small fat-containing left inguinal hernia.  There are severe degenerative changes in both 
hips, right greater than left.  Degenerative changes are seen in the spine.  Atelectasis is seen in b
oth lung bases, right greater than left.  

 

IMPRESSION: 

1.  Bilateral renal cysts.

 

2.  Diverticulosis.

 

3.  Left inguinal hernia.

 

POS: [facility]

## 2018-03-04 LAB
ANION GAP SERPL CALC-SCNC: 9 MMOL/L (ref 10–20)
BUN SERPL-MCNC: 32 MG/DL (ref 8.4–25.7)
CALCIUM SERPL-MCNC: 8.4 MG/DL (ref 7.8–10.44)
CHLORIDE SERPL-SCNC: 97 MMOL/L (ref 98–107)
CO2 SERPL-SCNC: 34 MMOL/L (ref 23–31)
CREAT CL PREDICTED SERPL C-G-VRATE: 202 ML/MIN (ref 70–130)
CRYSTAL-AUWI FLAG: 0 (ref 0–15)
GLUCOSE SERPL-MCNC: 116 MG/DL (ref 80–115)
HEV IGM SER QL: 2.9 (ref 0–7.99)
HGB BLD-MCNC: 13.2 G/DL (ref 14–18)
HYALINE CASTS #/AREA URNS LPF: (no result) LPF
MCH RBC QN AUTO: 32 PG (ref 27–31)
MCV RBC AUTO: 99.2 FL (ref 80–94)
MDIFF COMPLETE?: YES
PATHC CAST-AUWI FLAG: 0.81 (ref 0–2.49)
PLATELET # BLD AUTO: 236 THOU/UL (ref 130–400)
POTASSIUM SERPL-SCNC: 5 MMOL/L (ref 3.5–5.1)
RBC # BLD AUTO: 4.12 MILL/UL (ref 4.7–6.1)
RBC UR QL AUTO: (no result) HPF (ref 0–3)
SODIUM SERPL-SCNC: 135 MMOL/L (ref 136–145)
SP GR UR STRIP: 1.03 (ref 1–1.04)
SPERM-AUWI FLAG: 0 (ref 0–9.9)
VANCOMYCIN TROUGH SERPL-MCNC: 19.6 UG/ML
WBC # BLD AUTO: 15.4 THOU/UL (ref 4.8–10.8)
WBC UR QL AUTO: (no result) HPF (ref 0–3)
YEAST-AUWI FLAG: 0 (ref 0–25)

## 2018-03-04 RX ADMIN — PIPERACILLIN SODIUM AND TAZOBACTAM SODIUM SCH MLS: 3; .375 INJECTION, POWDER, LYOPHILIZED, FOR SOLUTION INTRAVENOUS at 00:06

## 2018-03-04 RX ADMIN — ASPIRIN SCH MG: 81 TABLET ORAL at 08:02

## 2018-03-04 RX ADMIN — PIPERACILLIN SODIUM AND TAZOBACTAM SODIUM SCH MLS: 3; .375 INJECTION, POWDER, LYOPHILIZED, FOR SOLUTION INTRAVENOUS at 05:36

## 2018-03-04 RX ADMIN — PIPERACILLIN SODIUM AND TAZOBACTAM SODIUM SCH MLS: 3; .375 INJECTION, POWDER, LYOPHILIZED, FOR SOLUTION INTRAVENOUS at 12:19

## 2018-03-04 RX ADMIN — Medication SCH ML: at 20:09

## 2018-03-04 RX ADMIN — Medication PRN ML: at 06:19

## 2018-03-04 RX ADMIN — VANCOMYCIN HYDROCHLORIDE SCH MLS: 1 INJECTION, POWDER, LYOPHILIZED, FOR SOLUTION INTRAVENOUS at 06:18

## 2018-03-04 RX ADMIN — Medication PRN ML: at 00:07

## 2018-03-04 RX ADMIN — PIPERACILLIN SODIUM AND TAZOBACTAM SODIUM SCH MLS: 3; .375 INJECTION, POWDER, LYOPHILIZED, FOR SOLUTION INTRAVENOUS at 17:29

## 2018-03-04 RX ADMIN — VANCOMYCIN HYDROCHLORIDE SCH MLS: 1 INJECTION, POWDER, LYOPHILIZED, FOR SOLUTION INTRAVENOUS at 22:28

## 2018-03-04 RX ADMIN — Medication SCH ML: at 08:02

## 2018-03-04 RX ADMIN — VANCOMYCIN HYDROCHLORIDE SCH MLS: 1 INJECTION, POWDER, LYOPHILIZED, FOR SOLUTION INTRAVENOUS at 14:08

## 2018-03-04 RX ADMIN — Medication PRN ML: at 05:36

## 2018-03-04 NOTE — PDOC.PN
- Subjective


Encounter Start Date: 03/04/18


Encounter Start Time: 10:45





Expresses no specific complaint.





- Objective


Resuscitation Status: 


 











Resuscitation Status           FULL:Full Resuscitation














Vital Signs & Weight: 


 Vital Signs (12 hours)











  Temp Pulse Resp BP BP Pulse Ox


 


 03/04/18 08:51       93 L


 


 03/04/18 08:50   78  16   


 


 03/04/18 08:02     147/65 H  


 


 03/04/18 07:44  98.5 F  75  18   155/77 H  93 L


 


 03/04/18 03:57  98.1 F  75  19   152/85 H  96


 


 03/04/18 00:40       95








 Weight











Admit Weight                   260 lb


 


Weight                         263 lb 1.6 oz














I&O: 


 











 03/03/18 03/04/18 03/05/18





 06:59 06:59 06:59


 


Intake Total 2490 3542 


 


Output Total 175 2135 160


 


Balance 2315 1407 -160











Result Diagrams: 


 03/04/18 04:13





 03/04/18 04:13





Phys Exam





- Physical Examination


Constitutional: NAD (On O2 via NC.)


HEENT: sclera anicteric


Neck: no JVD


Respiratory: wheezing present


Cardiovascular: RRR


Gastrointestinal: soft


Musculoskeletal: edema present (With skin changes of chronic venous 

insufficiency.)





Dx/Plan


(1) Acute exacerbation of CHF (congestive heart failure)


Code(s): I50.9 - HEART FAILURE, UNSPECIFIED   Status: Acute   


Qualifiers: 


   Heart failure type: diastolic   Qualified Code(s): I50.33 - Acute on chronic 

diastolic (congestive) heart failure   


Comment: ef of 50%   





(2) Acute respiratory failure with hypoxia


Code(s): J96.01 - ACUTE RESPIRATORY FAILURE WITH HYPOXIA   Status: Acute   

Comment: improving.   





(3) Atrial flutter


Code(s): I48.92 - UNSPECIFIED ATRIAL FLUTTER   Status: Acute   


Qualifiers: 


   Atrial flutter type: typical   Qualified Code(s): I48.3 - Typical atrial 

flutter   


Comment: in sinus rhythm   





(4) Morbid obesity


Code(s): E66.01 - MORBID (SEVERE) OBESITY DUE TO EXCESS CALORIES   Status: 

Acute   





(5) HTN (hypertension)


Code(s): I10 - ESSENTIAL (PRIMARY) HYPERTENSION   Status: Chronic   


Qualifiers: 


   Hypertension type: essential hypertension   Qualified Code(s): I10 - 

Essential (primary) hypertension   





(6) Osteoarthritis


Code(s): M19.90 - UNSPECIFIED OSTEOARTHRITIS, UNSPECIFIED SITE   Status: 

Chronic   


Qualifiers: 


   Osteoarthritis location: hip   Osteoarthritis type: primary   Laterality: 

bilateral   Qualified Code(s): M16.0 - Bilateral primary osteoarthritis of hip 

  





(7) WILMA (obstructive sleep apnea)


Code(s): G47.33 - OBSTRUCTIVE SLEEP APNEA (ADULT) (PEDIATRIC)   Status: 

Suspected   





- Plan


Overall condition is improving.


-: Continue current therapy.


-: Was seen by  because of hypoechoic mass on ultrasound of kidney..


-: CT of kidneys shows cysts, but no mass.





* .

## 2018-03-04 NOTE — PRG
DATE OF SERVICE:  03/04/2018

 

INPATIENT  PROGRESS NOTE

 

SUBJECTIVE:  No new complaints, resting comfortably.  I informed the patient 
regarding CT findings regarding workup for indeterminate renal mass 
demonstrating bilateral simple cysts with no evidence of enhancing renal mass.  
Incidentally noted, I informed the patient regarding microscopic hematuria in 
urine.  He does relate that with initiation of Flomax/Avodart, his flow has 
improved.  Denies sensation of incomplete void.  He  informs me today that he 
has a urologist, which he did not disclose to me yesterday.  He sees Dr. Moreland 
at the [facility].

 

PHYSICAL EXAMINATION:

VITAL SIGNS:  Stable.

ABDOMEN:  Morbidly obese, soft, nontender, nondistended.

 

PERTINENT LABORATORY DATA:  White count 15, hemoglobin 13, platelet 236, 
creatinine 0.5.  Urine culture is negative.  UA 4-6, rbc's.

 

IMAGING DATA:  CT of the abdomen and pelvis with and without IV contrast, 
bilateral renal cysts with no significant enhancing renal mass, no 
hydronephrosis, has previous large prostate volume approximately 80-90 grams.

 

IMPRESSION AND PLAN:

1.  Mr. Herbert is a 72-year-old morbidly obese male currently admitted for acute 
respiratory failure.  Urologic consultation obtained due to incidental 
bilateral renal lesions.  CT demonstrates bilateral simple cysts not of concern.

2.  Incidental large prostate on CT.

3.  Microscopic hematuria.  His voiding parameter has improved with Flomax; 
Avodart.  Would be advised to downsize his prostate given his large prostate on 
CT volume.  On today, he relates to me that he sees Dr. Moreland.  Informed 
patient to follow up with Dr. Moreland electively for cystoscopy, if it has not 
been worked up in the past.  will inform Dr. Moreland tomorrow morning regarding 
patient's admission.

 

[facility], [place]

## 2018-03-04 NOTE — EKG
Test Reason : 

Blood Pressure : ***/*** mmHG

Vent. Rate : 072 BPM     Atrial Rate : 288 BPM

   P-R Int : 000 ms          QRS Dur : 146 ms

    QT Int : 430 ms       P-R-T Axes : 003 -80 025 degrees

   QTc Int : 470 ms

 

Atrial flutter with 4:1 A-V conduction

Left axis deviation

Right bundle branch block

inferior MI unknown age

Abnormal ECG

When compared with ECG of 27-FEB-2018 17:40,

Borderline criteria for Lateral infarct are no longer Present

Confirmed by DR. LIZY SPARKS ([id number]) on 3/4/2018 3:30:35 PM

 

Referred By:  RORO           Confirmed By:DR. LIZY SPARKS

## 2018-03-04 NOTE — PRG
DATE OF SERVICE:  03/04/2018

 

SERVICE:  Pulmonary Medicine.

 

INTERVAL HISTORY:  The patient is really doing okay today.  He tells me that he feels much improved. 
 He does admittedly look a little less toxic today.  That being said, he still has a little bit of in
creased work of breathing.  He has less conversational dyspnea and is able to get most of the sentenc
e out before taking another breath.  Otherwise, there has been no interval change to his condition.

 

PHYSICAL EXAMINATION:

VITAL SIGNS:  Afebrile, pulse 76, blood pressure 113/67, respirations 22, saturation 93% on 3 liters 
nasal cannula.

GENERAL:  The patient is awake and alert.  He is in minimal respiratory distress.

HEENT:  Normocephalic, atraumatic.  Sclerae are white, conjunctivae pink.  Oral and nasal mucosa is m
oist without lesions.

LUNGS:  Excellent air entry.  There is no prolonged expiratory phase.  Crackles are present.  Wheezin
g is also noted.

HEART:  Normal rate, regular.

ABDOMEN:  Soft, nontender, nondistended.  Bowel sounds are positive.

MUSCULOSKELETAL:  No cyanosis or clubbing.  No pitting in the bilateral lower extremities.

NEUROLOGIC:  Grossly nonfocal.

 

LABORATORY DATA:  WBC 15.4 and up trending, hemoglobin 13.2, platelets 236,000.  Neutrophil count is 
stable and the band count is up and down.  Creatinine is 0.57, BUN 32.  Anion gap 9, bicarb has impro
mallorie to 34.  Sodium and chloride are just below the lower limits of normal.  Potassium is 5.0.  Aldola
se previously came back slightly elevated.  Urinalysis is unremarkable.  Vancomycin trough today was 
19.6.  Strep and legionella urine antigens are negative.  Respiratory virus culture, blood culture x2
, and urine culture are all negative.

 

ASSESSMENT:

1.  Acute hypoxic respiratory failure.

2.  Acute on chronic diastolic heart failure.

3.  Atrial flutter with rapid ventricular response, return to normal sinus rhythm.

4.  Severe sepsis.

5.  Community-acquired pneumonia.

6.  Osteoarthritis of the hips, bilateral.

7.  Debility secondary to advanced osteoarthritis.

8.  Renal cysts.

 

PLAN:  We will leave the patient in the IMCU for the time being.  We will continue empiric antibiotic
s, nebulized medication.  Steroids have been interrupted.  Hopefully, over the next 24 to 48 hours, t
he patient will make a significant improvement and be able to participate in a meaningful way with ph
ysical therapy.  If this occurs, he may go to PT in order to regain some strength prior to a bilatera
l hemiarthroplasty.  Ultimately, this will be required for him to survive long-term as his weakness i
s directly stemming from his hip disease.  Pulmonary will continue to follow.

## 2018-03-05 LAB
ANION GAP SERPL CALC-SCNC: 8 MMOL/L (ref 10–20)
BUN SERPL-MCNC: 34 MG/DL (ref 8.4–25.7)
CALCIUM SERPL-MCNC: 8.3 MG/DL (ref 7.8–10.44)
CHLORIDE SERPL-SCNC: 97 MMOL/L (ref 98–107)
CO2 SERPL-SCNC: 37 MMOL/L (ref 23–31)
CREAT CL PREDICTED SERPL C-G-VRATE: 200 ML/MIN (ref 70–130)
GLUCOSE SERPL-MCNC: 123 MG/DL (ref 80–115)
HGB BLD-MCNC: 11.3 G/DL (ref 14–18)
MCH RBC QN AUTO: 31.9 PG (ref 27–31)
MCV RBC AUTO: 98.9 FL (ref 80–94)
MDIFF COMPLETE?: YES
PLATELET # BLD AUTO: 237 THOU/UL (ref 130–400)
POTASSIUM SERPL-SCNC: 4.7 MMOL/L (ref 3.5–5.1)
RBC # BLD AUTO: 3.53 MILL/UL (ref 4.7–6.1)
SODIUM SERPL-SCNC: 137 MMOL/L (ref 136–145)
WBC # BLD AUTO: 15.7 THOU/UL (ref 4.8–10.8)

## 2018-03-05 RX ADMIN — Medication SCH ML: at 20:38

## 2018-03-05 RX ADMIN — PIPERACILLIN SODIUM AND TAZOBACTAM SODIUM SCH MLS: 3; .375 INJECTION, POWDER, LYOPHILIZED, FOR SOLUTION INTRAVENOUS at 12:27

## 2018-03-05 RX ADMIN — VANCOMYCIN HYDROCHLORIDE SCH MLS: 1 INJECTION, POWDER, LYOPHILIZED, FOR SOLUTION INTRAVENOUS at 14:12

## 2018-03-05 RX ADMIN — PIPERACILLIN SODIUM AND TAZOBACTAM SODIUM SCH MLS: 3; .375 INJECTION, POWDER, LYOPHILIZED, FOR SOLUTION INTRAVENOUS at 00:06

## 2018-03-05 RX ADMIN — VANCOMYCIN HYDROCHLORIDE SCH MLS: 1 INJECTION, POWDER, LYOPHILIZED, FOR SOLUTION INTRAVENOUS at 22:33

## 2018-03-05 RX ADMIN — VANCOMYCIN HYDROCHLORIDE SCH MLS: 1 INJECTION, POWDER, LYOPHILIZED, FOR SOLUTION INTRAVENOUS at 06:25

## 2018-03-05 RX ADMIN — Medication SCH ML: at 08:07

## 2018-03-05 RX ADMIN — PIPERACILLIN SODIUM AND TAZOBACTAM SODIUM SCH MLS: 3; .375 INJECTION, POWDER, LYOPHILIZED, FOR SOLUTION INTRAVENOUS at 17:35

## 2018-03-05 RX ADMIN — ASPIRIN SCH MG: 81 TABLET ORAL at 08:07

## 2018-03-05 RX ADMIN — PIPERACILLIN SODIUM AND TAZOBACTAM SODIUM SCH MLS: 3; .375 INJECTION, POWDER, LYOPHILIZED, FOR SOLUTION INTRAVENOUS at 05:45

## 2018-03-05 NOTE — RAD
PORTABLE CHEST:

 

HISTORY: 

Clinical status change.

 

CCU followup.  Shortness of breath.

 

COMPARISON: 

2/26/18.

 

FINDINGS: 

The patient is rotated which distorts the chest.  There is right effusion and right basilar infiltrat
e and/or atelectasis.  Mild left basilar infiltrate as well.

 

IMPRESSION: 

Bibasilar opacifications, slightly more prominent than 2/26/18.

 

POS: [facility]

## 2018-03-05 NOTE — CON
DATE OF CONSULTATION:  03/05/2018

 

CHIEF COMPLAINT:  Blood in stool.

 

HISTORY OF PRESENT ILLNESS:  Mr. Herbert is a 72-year-old man, who was admitted with weakness and was fo
und to have acute respiratory failure and pneumonia.  He developed atrial flutter while he is in the 
hospital.  He has since been extubated and started on full dose anticoagulation with Lovenox.  This m
orning, he started having multiple black tar-like stools.  He has had no nausea or vomiting.  No abdo
barbara pain associated with this.  No change in his shortness of breath or chest pain.  He has had no 
prior GI bleed.  No prior colonoscopies done.

 

PAST MEDICAL HISTORY:  Coronary artery disease, status post coronary stents; dyslipidemia; hypertensi
on; and severe osteoarthritis of both hips for which he is immobile.  He has COPD and obesity hypoven
tilation syndrome.

 

PAST SURGICAL HISTORY:  Negative other than the coronary stents.

 

FAMILY HISTORY:  Negative for GI malignancies.

 

SOCIAL HISTORY:  He quit smoking 10 years ago.  No drugs or alcohol.

 

ALLERGIES:  No known drug allergies.

 

MEDICATIONS:  His last dose of Lovenox was last night.  He is currently on aspirin, carvedilol, DuoNe
b inhalers, dutasteride, guaifenesin, pantoprazole, Zosyn, tamsulosin, and vancomycin.

 

REVIEW OF SYSTEMS:  Positive for constipation chronically.  He has been on hydrocodone longer-term as
 an outpatient and has a bowel movement every few days on that medication.

 

PHYSICAL EXAMINATION:

VITAL SIGNS:  Temperature 98.2, pulse 76, blood pressure 141/62, oxygen saturation 91% on 3 liters.

GENERAL:  He is in no acute distress.  He is alert and oriented x3.

HEENT:  Eyes have no scleral icterus.  Oropharynx is clear without lesions.

NECK:  There is no cervical or supraclavicular lymphadenopathy.

LUNGS:  Have bilateral expiratory wheezes.  He is obese.

HEART:  Regular rate and rhythm.

ABDOMEN:  Soft, nontender, nondistended.  Bowel sounds are present.

EXTREMITIES:  1+ pitting lower extremity edema.

RECTAL:  Reveals black loose stool in the rectal vault.

 

LABORATORY DATA:  White blood cell count 15.7; hemoglobin of 11.3, down from 13.2 yesterday, down fro
m 14.9 the day before that.  Platelets 14.8, creatinine 0.58, bilirubin was 1.6 on 02/27/2018, AST 26
, ALT 38, alkaline phosphatase 68.

 

IMPRESSION:

1.  Apparent upper gastrointestinal bleed, presenting with melena and a decrease in his hemoglobin fr
om 14.9 to 11.3.  He is hemodynamically stable.  This bleed occurred while on full dose anticoagulati
on with enoxaparin.  The enoxaparin was held, last dose was last night.  He has been giving the antic
oagulation for atrial flutter.

2.  Acute on chronic respiratory failure, status post extubation.  He has been diagnosed with pneumon
ia as well.  There is concern that any procedure will require intubation and general anesthesia.  The
refore, consideration is being given for trying to coordinate whatever procedures he needs under anes
thesia while he is intubated.  Cardiology is tentatively planning an EP study for tomorrow following 
endoscopy.

3.  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  He is immobile due to this.

 

RECOMMENDATIONS:

1.  Pantoprazole IV q.12 hours.

2.  EGD tomorrow.  It is anticipated that he will be intubated tomorrow morning and then endoscopy ca
n be performed in the afternoon followed by EP study.

## 2018-03-05 NOTE — PRG
DATE OF SERVICE:  03/05/2018

 

SERVICE:  Pulmonary Medicine.

 

INTERVAL HISTORY:  The patient is doing great from a respiratory standpoint.  Overnight, he had desat
urations associated with obstructive events.  He ended up getting on BiPAP.  He did not like that thi
ng, but he took it off this morning and he is breathing very comfortably.  His wife and him both said
 that he is at baseline as far as respiratory status goes.  He is always wheezing.  The wife relates 
that he is constantly falling asleep whenever talking to people because of his horrendous sleep apnea
.  He denies any current fevers, chills, nausea, vomiting or diarrhea.  Overnight, he had some black 
sticky stools.  Otherwise, there were no events.

 

PHYSICAL EXAMINATION:

VITAL SIGNS:  Afebrile, pulse 76, blood pressure 150/74, respirations 26, saturation 95% on 4 liters 
nasal cannula.

GENERAL:  The patient is awake and alert, no apparent distress.

LUNGS:  Decent air entry.  There is a prolonged expiratory phase with some wheezing.  No dependent cr
ackles are appreciated.

HEART:  Normal rate and regular.

ABDOMEN:  Soft, nontender, and nondistended.  Bowel sounds are positive.

MUSCULOSKELETAL:  No cyanosis or clubbing.  There is trace pitting in the bilateral lower extremities
.

NEUROLOGIC:  Grossly nonfocal.

 

LABORATORY DATA:  WBC 15.7, hemoglobin 11.3 and down trending, platelets 237,000.  Neutrophils 78% wi
th down trending band count once again.  Creatinine 0.58 and BUN 34.  Bicarbonate has increased once 
again to 37.  Basic metabolic profile is otherwise unremarkable.  Respiratory virus panel is negative
.  Blood cultures are negative and urine culture is also negative.

 

IMAGING:  Chest x-ray demonstrates extensive soft tissue attenuation.  There is bibasilar opacificati
ons which are more prominent compared to prior.  Widening of the carinal angle is suggestive of left 
atrial enlargement or subcarinal lymphadenopathy.

 

ASSESSMENT:

1.  Acute on chronic hypoxic and hypercapnic respiratory failure.

2.  Chronic diastolic heart failure, currently close to euvolemia.

3.  Atrial flutter with rapid ventricular response, currently rate controlled.

4.  Severe sepsis.

5.  Community-acquired pneumonia, resolving.

6.  Osteoarthritis of the hips, bilateral.

7.  Gastrointestinal bleed, suspected from the upper location.

8.  Acute blood loss anemia.

9.  Debility secondary to advanced osteoarthritis.

10.  Renal cyst.

 

PLAN:  We will trend the hemoglobin.  We will put GI consultation and start PPI twice daily.  Anticoa
gulation will be interrupted for the time being.  The large picture is that we have a very sticky sit
uation here.  If the patient cannot participate with physical therapy, then we need to proceed as jaquelin
n as possible with bilateral hip replacement.  If we allow the patient to get weaker, he will become 
a poor candidate for the surgery as time goes on.  Before we can proceed with that; however, we will 
need to do an EGD which may require intubation.  The patient is stable.  The evaluation of the atrial
 flutter from my perspective can be put on hold for the time being and take a backseat to these other
 things that are likely need to be addressed sooner.  Pulmonary will continue to follow and will leav
e him in this location.  He does not like using BiPAP, but I have encouraged him to use it at night f
or his obstructive sleep apnea.  I will switch around the settings a little bit.

## 2018-03-05 NOTE — PDOC.PN
- Subjective


Encounter Start Date: 03/05/18


Encounter Start Time: 11:00





Patient is seen today, alert and oriented. He is on nasal Canula but on Bipap 

intermittant. He is noted to Have GI bleeding today with Black stool, Gi is 

consulted.





- Objective


Resuscitation Status: 


 











Resuscitation Status           FULL:Full Resuscitation














MAR Reviewed: Yes


Vital Signs & Weight: 


 Vital Signs (12 hours)











  Temp Pulse Pulse Pulse Resp BP BP


 


 03/05/18 13:50   76    28 H  


 


 03/05/18 11:59  97.8 F  75    20  


 


 03/05/18 09:40    76  77    151/62 H


 


 03/05/18 08:22   76    29 H  


 


 03/05/18 08:13   75    26 H  


 


 03/05/18 08:07       147/65 H 


 


 03/05/18 08:00  98.6 F  76    20  


 


 03/05/18 07:33  98.6 F  76    20  


 


 03/05/18 04:00  99.1 F  81    18  














  BP BP Pulse Ox Pulse Ox Pulse Ox


 


 03/05/18 13:50    91 L  


 


 03/05/18 11:59   155/70 H  91 L  


 


 03/05/18 09:40  155/77 H    91 L  91 L


 


 03/05/18 08:22    95  


 


 03/05/18 08:13    90 L  


 


 03/05/18 08:07     


 


 03/05/18 08:00    91 L  


 


 03/05/18 07:33   150/74 H  92 L  


 


 03/05/18 04:00   156/84 H  99  








 Weight











Admit Weight                   260 lb


 


Weight                         265 lb 8 oz














I&O: 


 











 03/04/18 03/05/18 03/06/18





 06:59 06:59 06:59


 


Intake Total 3542 1450 


 


Output Total 2135 2510 


 


Balance 1407 -1060 











Result Diagrams: 


 03/05/18 03:54





 03/05/18 03:54


Additional Labs: 


 Accuchecks











  03/05/18





  01:30


 


POC Glucose  168 H











Radiology Reviewed by me: Yes





Phys Exam





- Physical Examination


HEENT: PERRLA, moist MMs


Neck: no nodes, no JVD


Respiratory: wheezing present


Crackles In Both Lungs noted. 


Cardiovascular: RRR, no significant murmur


Gastrointestinal: soft, non-tender


Musculoskeletal: edema present


Neurological: non-focal, normal sensation


Lymphatic: no nodes





Dx/Plan


(1) Melena


Code(s): K92.1 - MELENA   Status: Acute   Comment: Patient dropped Hb from 13 

to 11, GI is Consulted, will start patiebnt on protonix 40mg IV BID. Follow GI 

recommedations   





(2) Acute exacerbation of CHF (congestive heart failure)


Code(s): I50.9 - HEART FAILURE, UNSPECIFIED   Status: Acute   


Qualifiers: 


   Heart failure type: diastolic   Qualified Code(s): I50.33 - Acute on chronic 

diastolic (congestive) heart failure   


Comment: ef of 50%, Cardilogy Following, COntinue with ACEI/ BB.   





(3) Acute respiratory failure with hypoxia


Code(s): J96.01 - ACUTE RESPIRATORY FAILURE WITH HYPOXIA   Status: Acute   

Comment: improving. Pulmonary Following, pt on intermittant Bipap, very labile. 

Continue Nebs    





(4) Atrial flutter


Code(s): I48.92 - UNSPECIFIED ATRIAL FLUTTER   Status: Acute   


Qualifiers: 


   Atrial flutter type: typical   Qualified Code(s): I48.3 - Typical atrial 

flutter   


Comment: in sinus rhythm, Follow Dr. Tang Recommedations, plans for Ablation 

later.   





(5) Morbid obesity


Code(s): E66.01 - MORBID (SEVERE) OBESITY DUE TO EXCESS CALORIES   Status: 

Acute   





(6) PNA (pneumonia)


Code(s): J18.9 - PNEUMONIA, UNSPECIFIED ORGANISM   Status: Acute   Comment: cap

, Continue with IV antibiotics per pulmonary.   





(7) Sepsis


Code(s): A41.9 - SEPSIS, UNSPECIFIED ORGANISM   Status: Acute   


Qualifiers: 


   Sepsis type: sepsis due to unspecified organism   Qualified Code(s): A41.9 - 

Sepsis, unspecified organism   


Comment: Improving.    





(8) HTN (hypertension)


Code(s): I10 - ESSENTIAL (PRIMARY) HYPERTENSION   Status: Chronic   


Qualifiers: 


   Hypertension type: essential hypertension   Qualified Code(s): I10 - 

Essential (primary) hypertension   





- Plan


cont current plan of care, continue antibiotics, PT/OT, , 

incentive spirometry, DVT proph w/SCDs





* .








- Discharge Day


Encounter end time: 11:40





Review of Systems





- Review of Systems


Eyes: negative: Pain, Vision Change, Conjunctivae Inflammation, Eyelid 

Inflammation, Redness, Other


ENT: negative: Ear Pain, Ear Discharge, Nose Pain, Nose Discharge, Nose 

Congestion, Mouth Pain, Mouth Swelling, Throat Pain, Throat Swelling, Other


Respiratory: Cough, Shortness of Breath, Wheezing.  negative: Dry, Hemoptysis, 

SOB with Excertion, Pleuritic Pain, Sputum


Cardiovascular: orthopnea, paroxysmal nocturnal dyspnea, edema


Gastrointestinal: negative: Nausea, Vomiting, Abdominal Pain, Diarrhea, 

Constipation, Melena, Hematochezia, Other


Musculoskeletal: negative: Neck Pain, Shoulder Pain, Arm Pain, Back Pain, Hand 

Pain, Leg Pain, Foot Pain, Other


Skin: negative: Rash, Lesions, Jason, Bruising, Other





- Medications/Allergies


Allergies/Adverse Reactions: 


 Allergies











Allergy/AdvReac Type Severity Reaction Status Date / Time


 


No Known Drug Allergies Allergy   Verified 02/26/18 14:53











Medications: 


 Current Medications





Acetaminophen (Tylenol)  650 mg PO Q4H PRN


   PRN Reason: Headache/Fever or Pain


   Last Admin: 03/03/18 07:42 Dose:  650 mg


Albuterol/Ipratropium (Duoneb)  3 ml NEB [id number]-BW [facility]


   Last Admin: 03/05/18 13:50 Dose:  3 ml


Aspirin (Ecotrin)  81 mg PO DAILY [facility]


   Last Admin: 03/05/18 08:07 Dose:  81 mg


Carvedilol (Coreg)  6.25 mg PO BID-[facility]


   Last Admin: 03/05/18 08:07 Dose:  6.25 mg


Docusate Sodium (Colace)  100 mg PO BID [facility]


   Last Admin: 03/05/18 08:08 Dose:  Not Given


Dutasteride (Avodart)  0.5 mg PO DAILY [facility]


   Last Admin: 03/05/18 08:07 Dose:  0.5 mg


Guaifenesin (Mucinex)  1,200 mg PO Q12HR [facility]


   Last Admin: 03/05/18 08:07 Dose:  1,200 mg


Piperacillin Sod/Tazobactam Sod 3.375 gm/ Miscellaneous Medication 1 each/ 

Sodium Chloride  100 mls @ 200 mls/hr IVPB Q6HR [facility]


   Last Admin: 03/05/18 12:27 Dose:  100 mls


Vancomycin HCl 1.5 gm/Miscellaneous Medication 1 each/ Sodium Chloride  300 mls 

@ 200 mls/hr IVPB Q8HR [facility]


   Last Admin: 03/05/18 14:12 Dose:  300 mls


Miscellaneous Medication (Pharmacy To Dose)  1 each IVPB ASDIR [facility]


Pantoprazole Sodium (Protonix)  40 mg IVP Q12HR [facility]


Sodium Chloride (Flush - Normal Saline)  10 ml IVF Q12HR [facility]


   Last Admin: 03/05/18 08:07 Dose:  10 ml


Sodium Chloride (Flush - Normal Saline)  10 ml IVF PRN PRN


   PRN Reason: Saline Flush


   Last Admin: 03/04/18 06:19 Dose:  10 ml


Tamsulosin HCl (Flomax)  0.4 mg PO DAILY [facility]


   Last Admin: 03/05/18 08:07 Dose:  0.4 mg

## 2018-03-05 NOTE — PQF
CLINICAL DOCUMENTATION IMPROVEMENT CLARIFICATION FORM:  ICD-10 Updated



PLEASE DO AN ADDENDUM TO THE PROGRESS NOTE WITH ANY DOCUMENTATION UPDATES OR 
ADDITIONS AND CARRY THROUGH TO DC SUMMARY.   THANK YOU.



DATE:   3/5                                                                    
      ATTN:  DR. ANSHU SANTOS



Please exercise your independent, professional judgment in responding to the 
clarification form. Clinical indicators are provided on the bottom of this form 
for your review



Diagnosis: SEVERE SEPSIS



Present on Admission (POA):  [x  ] Yes             [  ] No             [  ] 
Unable to determine





ER PRESENTATION 2/26 - RA SAT 89%, PLACED ON BIPAP, WEANED DOWN TO FACE MASK



PHYSICIAN H&P DOCUMENTATION 2/26:  CLINICAL IMPRESSION & PLAN:  PATIENT WILL BE 
ADMITTED TO IMCU FOR ACUTE RESPIRATORY FAILURE W/HYPOXIA



CARDIOLOGY CONSULT 2/28:  HX OF PRESENT ILLNESS:  IT APPEARS HE IS LIKELY SEPTIC
; IMPRESSION:  2.  SEPSIS



ATTENDING PN 2/28:  PLAN:  HAS 42% BANDS WITH NORMAL WBC

ATTENDING PN 3/2:  IMPRESSION:  9)  SEPSIS



PULMONOLOGY PN 3/1:  IMPRESSION:  4)  SEVERE SEPSIS; 3/2:  5) SEVERE SEPSIS; 3/
3 & 4:   4) SEVERE SEPSIS



CLINICAL INDICATORS - SIGNS / SYMPTOMS / LABS



BANDS 2/27:  17%; INCREASED TO 42% ON 2/28     PRESENTED TO ER WITH ACUTE 
RESPIRATORY FAILURE W/HYPOXIA      

RR IN ER 2/26:  24

        

RISK FACTORS:

DECONDITIONING

ACUTE RESPIRATORY FAILURE

PNEUMONIA



TREATMENT:

IV ANTIBIOTICS (LEVAQUIN 2/26 - 27; VANCOMYCIN & ZOSYN 2/27 - PRESENT)

IMCU MONITORING

PULMONOLOGY CONSULT



THANK YOU!  Jannie



(This form is maintained as a part of the permanent medical record)

 2015 [employer].  All Rights Reserved

Jannie Corrales RN, BSN    [email or web address]    Office:  [phone]

                                                              

[facility]

## 2018-03-05 NOTE — CON
DATE OF CONSULTATION:  03/05/2018

 

DICTATED BY:  KELVIN Mcdaniels

 

REFERRING PHYSICIAN:  Dr. Arce

 

REASON FOR CONSULTATION:  Atrial fibrillation and flutter with RVR.

 

HISTORY OF PRESENT ILLNESS:  Mr. Herbert is a 72-year-old gentleman with multiple 
recent hospitalizations for weakness and fatigue.  He had an episode of acute 
respiratory distress and appeared to be septic about a week ago.  He has shown 
significant improvement since that time; however, did have an episode of atrial 
fibrillation versus atrial flutter with rapid ventricular response.  He has 
since converted back to normal sinus rhythm.  He has been on amiodarone which 
was discontinued for the possibility of an ablation.  The patient denies a 
history of atrial arrhythmias, but does endorse a history of coronary artery 
disease with 3 prior stents placed in the past by his primary cardiologist, Dr. Keon Fried.

 

The patient has recently developed very dark tarry stools and was previously on 
Lovenox, this was held this morning.  

 

PAST MEDICAL HISTORY:

1.  Coronary artery disease with prior PCI and stenting x3.

2.  Hyperlipidemia.

3.  Hypertension.

4.  Osteoarthritis.

 

HOME MEDICATIONS:  Coreg, lisinopril, Plavix, Lasix, simvastatin, Norvasc, Norco
, and aspirin.

 

ALLERGIES:  None.

 

SOCIAL HISTORY:  Negative for tobacco habituation or alcohol abuse.

 

FAMILY HISTORY:  Negative for coronary artery disease or early onset before the 
age of 55.

 

REVIEW OF SYSTEMS:  Twelve point review of systems was conducted and is 
negative except that listed in the history of present illness.

 

PHYSICAL EXAMINATION: 

VITAL SIGNS:  Most recent vital signs; temperature 98.6 degrees Fahrenheit, 
pulse 76, respirations are 29, oxygen saturation is 95% on 4 liters via nasal 
cannula, blood pressure 147/65.

GENERAL:  This is a 72-year-old gentleman who does appear somewhat older than 
his stated age.  He is an alert, oriented x3.  His speech is clear.  His affect 
is appropriate.

NEUROLOGIC:  Grossly intact cranial nerves II-XII and exam is nonfocal.

HEENT:  Sclerae are anicteric.  EOMs are intact.

NECK:  Supple without jugular venous distention.  Oral mucosa is moist and pink 
with adequate dentition.

CHEST:  Clear to auscultation, but the patient is tachypneic.  Respirations are 
generally unlabored.

HEART:  His heart rate is currently regular.  The patient is tachycardic 
intermittently.  PMI is nondisplaced.

ABDOMEN:  Benign without palpable masses.  The abdomen is soft and nontender.  
Hepatojugular reflex is negative.

EXTREMITIES:  Warm and dry to touch without clubbing, cyanosis or edema.

 

DATABASE:  Telemetry strips and EKGs were reviewed.  The patient is currently 
in 3:1 atrial flutter which appears typical, heart rate is in the 80s-100 range.

 

Hematology on 03/05/2018 -  WBC 15.7, hemoglobin 11.3, hematocrit 34.9, 
platelet count is 237.  Chemistry on 03/05/2018 - Sodium 137, potassium 4.7, 
chloride 97, CO2 is 37, BUN is 34, creatinine is 0.58.  

 

Chest x-ray on 03/05/2018; right effusion and right basilar infiltrate and/or 
atelectasis.  Mild left basilar infiltrate as well.

 

IMPRESSION:  

1.  Atrial flutter, possibly typical in morphology.

2.  Respiratory failure, gradually improving, but continues to require 
supplemental oxygen given his pneumonia.

3.  Diastolic heart failure with preserved ejection fraction of 50-55%.

4.  Recent development of dark tarry stools and possible gastrointestinal 
bleed.  Lovenox was held this morning, the patient has a GI referral and is 
likely having an EGD.

 

PLAN:  Atrial flutter ablation after medically stabilized.  Agree with the 
discontinuation of the amiodarone for this purpose of the study.  The patient 
will need his anticoagulation status addressed after a workup for 
gastrointestinal bleed.



Risk benefits were discussed with the patient.

  

Thank you for allowing us to participate in the care of this patient.

 

[facility]

## 2018-03-05 NOTE — PQF
CLINICAL DOCUMENTATION IMPROVEMENT CLARIFICATION FORM:  ICD-10 Updated



PLEASE DO AN ADDENDUM TO THE PROGRESS NOTE WITH ANY DOCUMENTATION UPDATES OR 
ADDITIONS AND CARRY THROUGH TO DC SUMMARY.   THANK YOU.



DATE:    3/5                                                                   
                  ATTN:  DR. ANSHU SANTOS



Please exercise your independent, professional judgment in responding to the 
clarification form. Clinical indicators are provided on the bottom of this form 
for your review.



Diagnosis:  COMMUNITY ACQUIRED PNEUMONIA



Present on Admission (POA):  [x  ] Yes             [  ] No             [  ] 
Unable to determine



 

CLINICAL INDICATORS - SIGNS / SYMPTOMS / LABS



PULMONOLOGY PN 2/28:  CT SCAN DEMONSTRATING SMALL INFILTRATE RLL THAT COULD BE C
/W PNEUMONIA.  ...STARTED WITH BRONCHOGRAMS, MAKING ME THINK A SMALL PATCH 
PNEUMONIA DOES TRULY EXIST



PULMONOLOGY PN 3/1 - 4:  IMPRESSION:  5)  COMMUNITY ACQUIRED PNEUMONIA



CARDIOLOGY PN 3/2:  IMPRESSION:  3)  PNEUMONIA



ATTENDING PN 3/2:  IMPRESSION:  8)  PNEUMONIA, COMMUNITY ACQUIRED

        

RISK FACTORS:

DECONDITIONING

ACUTE RESPIRATORY FAILURE W/HYPOXIA ON ADMIT (2/26)

SEPSIS



TREATMENT:

IV ANTIBIOTICS (LEVAQUIN 2/26 & 27; VANCOMYCIN & ZOSYN 2/27 - PRESENT)

PULMONOLOGY CONSULT



THANK YOU!  Jannie



(This form is maintained as a part of the permanent medical record)

 2015 [email or web address].  All Rights Reserved

Jannie Corrales RN, BSN    [email or web address]    Office:  [phone]

                                                              

[facility]

## 2018-03-06 LAB
ANALYZER IN CARDIO: (no result)
ANION GAP SERPL CALC-SCNC: 7 MMOL/L (ref 10–20)
BASE EXCESS STD BLDA CALC-SCNC: 6.5 MEQ/L
BUN SERPL-MCNC: 29 MG/DL (ref 8.4–25.7)
CA-I BLDA-SCNC: 1.1 MMOL/L (ref 1.12–1.3)
CALCIUM SERPL-MCNC: 8.2 MG/DL (ref 7.8–10.44)
CHLORIDE SERPL-SCNC: 100 MMOL/L (ref 98–107)
CO2 SERPL-SCNC: 36 MMOL/L (ref 23–31)
CREAT CL PREDICTED SERPL C-G-VRATE: 186 ML/MIN (ref 70–130)
GLUCOSE SERPL-MCNC: 119 MG/DL (ref 80–115)
HCO3 BLDA-SCNC: 34.3 MEQ/L (ref 22–26)
HCT VFR BLDA CALC: 27.7 % (ref 42–52)
HGB BLD-MCNC: 9.1 G/DL (ref 14–18)
HGB BLDA-MCNC: 8.5 G/DL (ref 14–18)
MCH RBC QN AUTO: 31.8 PG (ref 27–31)
MCV RBC AUTO: 96.5 FL (ref 80–94)
MDIFF COMPLETE?: YES
PCO2 BLDA: 72.4 MMHG (ref 35–45)
PH BLDA: 7.29 [PH] (ref 7.35–7.45)
PLATELET # BLD AUTO: 212 THOU/UL (ref 130–400)
PO2 BLDA: 78.3 MMHG (ref 80–100)
POTASSIUM SERPL-SCNC: 4.5 MMOL/L (ref 3.5–5.1)
RBC # BLD AUTO: 2.88 MILL/UL (ref 4.7–6.1)
SODIUM SERPL-SCNC: 138 MMOL/L (ref 136–145)
SPECIMEN DRAWN FROM PATIENT: (no result)
WBC # BLD AUTO: 11 THOU/UL (ref 4.8–10.8)

## 2018-03-06 PROCEDURE — 0W3P8ZZ CONTROL BLEEDING IN GASTROINTESTINAL TRACT, VIA NATURAL OR ARTIFICIAL OPENING ENDOSCOPIC: ICD-10-PCS | Performed by: INTERNAL MEDICINE

## 2018-03-06 PROCEDURE — 3E0G8GC INTRODUCTION OF OTHER THERAPEUTIC SUBSTANCE INTO UPPER GI, VIA NATURAL OR ARTIFICIAL OPENING ENDOSCOPIC: ICD-10-PCS | Performed by: INTERNAL MEDICINE

## 2018-03-06 PROCEDURE — 0BH17EZ INSERTION OF ENDOTRACHEAL AIRWAY INTO TRACHEA, VIA NATURAL OR ARTIFICIAL OPENING: ICD-10-PCS | Performed by: INTERNAL MEDICINE

## 2018-03-06 PROCEDURE — 5A1945Z RESPIRATORY VENTILATION, 24-96 CONSECUTIVE HOURS: ICD-10-PCS | Performed by: INTERNAL MEDICINE

## 2018-03-06 PROCEDURE — 30233N1 TRANSFUSION OF NONAUTOLOGOUS RED BLOOD CELLS INTO PERIPHERAL VEIN, PERCUTANEOUS APPROACH: ICD-10-PCS | Performed by: INTERNAL MEDICINE

## 2018-03-06 RX ADMIN — PIPERACILLIN SODIUM AND TAZOBACTAM SODIUM SCH MLS: 3; .375 INJECTION, POWDER, LYOPHILIZED, FOR SOLUTION INTRAVENOUS at 12:25

## 2018-03-06 RX ADMIN — Medication PRN ML: at 05:03

## 2018-03-06 RX ADMIN — Medication SCH ML: at 10:15

## 2018-03-06 RX ADMIN — PIPERACILLIN SODIUM AND TAZOBACTAM SODIUM SCH MLS: 3; .375 INJECTION, POWDER, LYOPHILIZED, FOR SOLUTION INTRAVENOUS at 05:03

## 2018-03-06 RX ADMIN — PIPERACILLIN SODIUM AND TAZOBACTAM SODIUM SCH MLS: 3; .375 INJECTION, POWDER, LYOPHILIZED, FOR SOLUTION INTRAVENOUS at 00:18

## 2018-03-06 RX ADMIN — VANCOMYCIN HYDROCHLORIDE SCH MLS: 1 INJECTION, POWDER, LYOPHILIZED, FOR SOLUTION INTRAVENOUS at 05:40

## 2018-03-06 RX ADMIN — Medication SCH ML: at 21:26

## 2018-03-06 RX ADMIN — ASPIRIN SCH: 81 TABLET ORAL at 09:52

## 2018-03-06 RX ADMIN — PIPERACILLIN SODIUM AND TAZOBACTAM SODIUM SCH MLS: 3; .375 INJECTION, POWDER, LYOPHILIZED, FOR SOLUTION INTRAVENOUS at 18:39

## 2018-03-06 NOTE — OP
DATE OF SERVICE:  03/06/2018

 

SERVICE:  Pulmonary Medicine.

 

PROCEDURE:  Endotracheal intubation.

 

CONSENT:  This was implied secondary to urgent situation.  The risks and benefits of this procedure w
ere discussed with the patient's wife prior to initiating.

 

STAFF PHYSICIAN:  Anastacio Guerra M.D.

 

MEDICATIONS:

1.  Etomidate 40 mg IV push.

2.  Versed 2 mg IV push.

 

PREOPERATIVE DIAGNOSES:

1.  Acute blood loss anemia.

2.  Chronic hypoxic and hypercapnic respiratory failure.

 

POSTPROCEDURE DIAGNOSES:

1.  Acute blood loss anemia.

2.  Chronic hypoxic and hypercapnic respiratory failure.

 

DESCRIPTION OF PROCEDURE:  Vital sign monitoring was accomplished by noninvasive hemodynamic monitori
ng, pulse oximetry, and telemetry.  In the supine position, the patient was preoxygenated with BiPAP 
with 100% FIO2.  He was maintained with saturations of 100%.  Following induction of anesthesia, Glid
eScope was inserted through the mouth offering clear identification of the posterior oropharynx and l
aryngeal structures with a grade I view.  An 8.0 endotracheal tube was visualized passing through the
 vocal cords.  Placement was confirmed by condensation in the endotracheal tube, colorimetric capnogr
aphy, and by axillary chest auscultation.  The endotracheal tube was secured at 23 cm, measured at th
e teeth.  The patient was placed on mechanical ventilation with good return of volumes.  Post-procedu
re x-ray revealed good location of the endotracheal tube within the trachea.

 

ESTIMATED BLOOD LOSS:  None.

 

COMPLICATIONS:  Transient hypotension that resolved within about 2-3 minutes.

## 2018-03-06 NOTE — RAD
CHEST ONE VIEW:

 

Comparison: 3-5-18

 

History: Respiratory distress. 

 

FINDINGS: 

Frontal upright chest demonstrates an endotracheal tube beyond the level of the clavicles. Lung volum
es are diminished. There is crowding of the pulmonary vasculature. There is persistent opacification 
of the right lung base. Persistent cardiomegaly. No pneumothorax. 

 

IMPRESSION: 

1. Diminished lung volumes due to poor inflation. 

2. Persistent opacification in the right lung base. Continued surveillance. 

 

POS: TALIA

## 2018-03-06 NOTE — PRG
DATE OF SERVICE:  03/06/2018

 

SERVICE:  Pulmonary Medicine.

 

INTERVAL HISTORY:  The patient is doing fine from a respiratory standpoint.  In fact, this is probabl
y the best he has looked from a respiratory standpoint since he has been in the hospital.  He does no
t have any specific complaints this morning.  He is upset that he is n.p.o. for possible procedures t
his afternoon.  Otherwise, there has been no interval change in his condition.  I have touched base re[facility].  They are suggesting to me that under no circumstances at this point would he be a ca
ndidate for procedure.  As such, we will move forward with EGD and ablation.  Unfortunately, in order
 to do this procedure safely, we will still need to intubate him despite the fact that he has returne
d to baseline from a respiratory standpoint.  Hemoglobin dropped a couple of grams once again.  Other
wise, there were no events.

 

PHYSICAL EXAMINATION:

VITAL SIGNS:  Afebrile, pulse 79, blood pressure 155/68, respirations 20, saturation 94% on 3 liters 
nasal cannula.

GENERAL:  The patient is awake, alert, no apparent distress.

LUNGS:  Decent air entry.  There is no prolonged expiratory phase or wheezing present.

HEART:  Normal rate, regular.

ABDOMEN:  Soft, nontender, nondistended.  Bowel sounds are positive.

MUSCULOSKELETAL:  No cyanosis or clubbing.  There is no pitting in the bilateral lower extremities.

NEUROLOGIC:  Grossly nonfocal.

 

LABORATORY DATA:  WBC 11.0, hemoglobin 9.1 which continues to down trend from 15 at his baseline.  Pl
atelets 212,000.  Lymphocyte count 10%.  Basic metabolic profile is essentially unremarkable.  Anion 
gap 7, BUN 29.  Bicarbonate is 36.  All culture results are negative to date.

 

ASSESSMENT:

1.  Acute on chronic hypoxic and hypercapnic respiratory failure, resolved to baseline.

2.  Chronic diastolic heart failure, currently euvolemic.

3.  Atrial flutter with rapid ventricular response, rate controlled.

4.  Acute blood loss anemia secondary to suspected upper gastrointestinal bleed.

5.  Severe sepsis, resolved.

6.  Community-acquired pneumonia, resolved.

7.  Osteoarthritis of the hips, bilateral.

8.  Debility, severe.

 

DISCUSSION AND PLAN:  We will move over to the ICU and electively intubate him so that we can perform
 both of these procedures safely.  Pulmonary Critical Care will continue to follow very closely.  We 
will make every effort to extubate him just as soon as our consultants are ready for it.  I will rest
 him on the ventilator overnight, but he cannot tolerate much deconditioning and will need to try to 
get the tube out as soon as possible.  In the event that we were unsuccessful at extubating the patie
nt, he has suggested to me that it would be reasonable to perform tracheostomy.  I do think this woul
d be a temporary thing.  Pulmonary Critical Care will continue to follow.

 

CRITICAL CARE TIME:  30 minutes.

## 2018-03-06 NOTE — PDOC.CTH
<Quita Pino - Last Filed: 03/06/18 16:42>





Cardiology Progress Note





- Subjective





EP progress note





Patient now intubated for endoscopy with GI today. 





- ROS


not able to obtain ROS





- Objective


 Vital Signs











  Temp Pulse Resp BP BP Pulse Ox


 


 03/06/18 15:06  99.2 F   15   


 


 03/06/18 14:53  99.2 F   15    95


 


 03/06/18 12:38   82   134/67  


 


 03/06/18 12:00  99.2 F     


 


 03/06/18 10:20   79   101/55 L  


 


 03/06/18 10:09     147/65 H  


 


 03/06/18 08:00  98.9 F  76  20    94 L


 


 03/06/18 07:39  98.9 F  76  20   155/68 H  94 L


 


 03/06/18 06:48   75  18   








 











Admit Weight                   260 lb


 


Weight                         266 lb 5 oz














 











 03/05/18 03/06/18 03/07/18





 06:59 06:59 06:59


 


Intake Total 1450 1040 0


 


Output Total 2510 700 


 


Balance -1060 340 0














- Physical Examination


General/Neuro: other: (intubated and sedated)


Neck: no JVD present


Lungs: CTA


Heart: PMI normal, RRR





- Telemetry


Telemetry Rhythm: NSR





- Labs


Result Diagrams: 


 03/06/18 04:39





 03/06/18 04:39


 Troponin/CKMB











  


 


  














- Assessment/Plan


1. Typical Atrial flutter, paroxysmal-currently maintaining NSR off amiodarone. 

Plan for CTI ablation when medically stable. Possibly 3/7 afternoon at 3pm. 

Will reassess in AM.


2. Anemia, with new melena- Hgb decreased another 2 points today. Patient is 

followed by GI, endoscopy today to assess for possible GIB. Currently receiving 

blood transfusion.


3. Oral anticoagulation- on hold given acute blood loss. Will re-address once 

bleeding stabilizes.





<Timo Tang - Last Filed: 03/07/18 19:03>





Cardiology Progress Note





- Objective


 Vital Signs











  Temp Pulse Resp BP Pulse Ox


 


 03/07/18 18:07   86   175/113 H 


 


 03/07/18 18:00    18  


 


 03/07/18 14:00    17  


 


 03/07/18 13:05   75   


 


 03/07/18 12:00    16  


 


 03/07/18 11:07   74   


 


 03/07/18 10:00    13  


 


 03/07/18 08:34   78   


 


 03/07/18 08:00  99.1 F  79  22 H   93 L








 











Admit Weight                   260 lb


 


Weight                         253 lb 12.033 oz














 











 03/06/18 03/07/18 03/08/18





 06:59 06:59 06:59


 


Intake Total 1040 2753 813


 


Output Total 700 1790 715


 


Balance 340 963 98














- Labs


Result Diagrams: 


 03/07/18 05:15





 03/07/18 05:15


 Troponin/CKMB











CK-MB (CK-2)  1.5 ng/mL (0-6.6)   03/07/18  10:52    


 


Troponin I  0.095 ng/mL (< 0.028)  H  03/07/18  10:52    














Attending Addendum





- Attending Addendum


Date/Time: 03/07/18 1903





I personally evaluated the patient and discussed the management with Ms Pino.


I agree with the History, Examination, Assessment and Plan documented above 

with any addition or exceptions noted below.

## 2018-03-06 NOTE — PDOC.PN
- Subjective


Encounter Start Date: 03/06/18


Encounter Start Time: 10:00


-: non-verbal





Patient is Intubated. 





- Objective


Resuscitation Status: 


 











Resuscitation Status           FULL:Full Resuscitation














MAR Reviewed: Yes


Vital Signs & Weight: 


 Vital Signs (12 hours)











  Temp Pulse Resp BP BP Pulse Ox


 


 03/06/18 12:38   82   134/67  


 


 03/06/18 12:00  99.2 F     


 


 03/06/18 10:20   79   101/55 L  


 


 03/06/18 10:09     147/65 H  


 


 03/06/18 08:00  98.9 F  76  20    94 L


 


 03/06/18 07:39  98.9 F  76  20   155/68 H  94 L


 


 03/06/18 06:48   75  18   


 


 03/06/18 04:11  98.0 F  77  16   139/80  96


 


 03/06/18 02:14   77  18    95








 Weight











Admit Weight                   260 lb


 


Weight                         266 lb 5 oz











 Most Recent Monitor Data











Heart Rate from ECG            57


 


NIBP                           119/61


 


NIBP BP-Mean                   67


 


Respiration from ECG           6


 


SpO2                           91














I&O: 


 











 03/05/18 03/06/18 03/07/18





 06:59 06:59 06:59


 


Intake Total 1450 1040 


 


Output Total 2510 700 


 


Balance -1060 340 











Result Diagrams: 


 03/08/18 05:13





 03/08/18 05:13


Radiology Reviewed by me: Yes





Phys Exam





- Physical Examination


Neck: no nodes, no JVD


Respiratory: wheezing present


Cardiovascular: RRR, no significant murmur


Gastrointestinal: soft, non-tender


Musculoskeletal: edema present


Lymphatic: no nodes





Dx/Plan


(1) Melena


Code(s): K92.1 - MELENA   Status: Acute   Comment: Patient dropped Hb from 13 

to 11, GI is Consulted, will start patiebnt on protonix 40mg IV BID. Follow GI 

recommedations   





(2) Acute exacerbation of CHF (congestive heart failure)


Code(s): I50.9 - HEART FAILURE, UNSPECIFIED   Status: Acute   


Qualifiers: 


   Heart failure type: diastolic   Qualified Code(s): I50.33 - Acute on chronic 

diastolic (congestive) heart failure   


Comment: ef of 50%, Cardilogy Following, COntinue with ACEI/ BB.   





(3) Acute respiratory failure with hypoxia


Code(s): J96.01 - ACUTE RESPIRATORY FAILURE WITH HYPOXIA   Status: Acute   

Comment: improving. Pulmonary Following, pt on intermittant Bipap, very labile. 

Continue Nebs    





(4) Atrial flutter


Code(s): I48.92 - UNSPECIFIED ATRIAL FLUTTER   Status: Acute   


Qualifiers: 


   Atrial flutter type: typical   Qualified Code(s): I48.3 - Typical atrial 

flutter   


Comment: in sinus rhythm, Follow Dr. Tang Recommedations, plans for Ablation 

later.   





(5) Morbid obesity


Code(s): E66.01 - MORBID (SEVERE) OBESITY DUE TO EXCESS CALORIES   Status: 

Acute   





(6) PNA (pneumonia)


Code(s): J18.9 - PNEUMONIA, UNSPECIFIED ORGANISM   Status: Acute   Comment: cap

, Continue with IV antibiotics per pulmonary.   





(7) Sepsis


Code(s): A41.9 - SEPSIS, UNSPECIFIED ORGANISM   Status: Acute   


Qualifiers: 


   Sepsis type: sepsis due to unspecified organism   Qualified Code(s): A41.9 - 

Sepsis, unspecified organism   


Comment: Improving.    





(8) HTN (hypertension)


Code(s): I10 - ESSENTIAL (PRIMARY) HYPERTENSION   Status: Chronic   


Qualifiers: 


   Hypertension type: essential hypertension   Qualified Code(s): I10 - 

Essential (primary) hypertension   





- Plan


cont current plan of care, PT/OT, incentive spirometry, DVT proph w/SCDs





* .








- Discharge Day


Encounter end time: 10:35





Review of Systems





- Review of Systems


ENT: negative: Ear Pain, Ear Discharge, Nose Pain, Nose Discharge, Nose 

Congestion, Mouth Pain, Mouth Swelling, Throat Pain, Throat Swelling, Other


Respiratory: negative: Cough, Dry, Shortness of Breath, Hemoptysis, SOB with 

Excertion, Pleuritic Pain, Sputum, Wheezing


Cardiovascular: negative: chest pain, palpitations, orthopnea, paroxysmal 

nocturnal dyspnea, edema, light headedness, other


Musculoskeletal: negative: Neck Pain, Shoulder Pain, Arm Pain, Back Pain, Hand 

Pain, Leg Pain, Foot Pain, Other


Other: 





Unable to obtain as Pt is sedated Intubated





- Medications/Allergies


Allergies/Adverse Reactions: 


 Allergies











Allergy/AdvReac Type Severity Reaction Status Date / Time


 


No Known Drug Allergies Allergy   Verified 02/26/18 14:53











Medications: 


 Current Medications





Acetaminophen (Tylenol)  650 mg PO Q4H PRN


   PRN Reason: Headache/Fever or Pain


   Last Admin: 03/03/18 07:42 Dose:  650 mg


Albuterol/Ipratropium (Duoneb)  3 ml NEB [id number] [facility]


   Last Admin: 03/06/18 12:37 Dose:  3 ml


Aspirin (Ecotrin)  81 mg PO DAILY [facility]


   Last Admin: 03/06/18 09:52 Dose:  Not Given


Docusate Sodium (Colace)  100 mg PO BID [facility]


   Last Admin: 03/06/18 09:52 Dose:  Not Given


Dutasteride (Avodart)  0.5 mg PO DAILY [facility]


   Last Admin: 03/06/18 09:52 Dose:  Not Given


Guaifenesin (Mucinex)  1,200 mg PO Q12HR [facility]


   Last Admin: 03/06/18 09:52 Dose:  Not Given


Piperacillin Sod/Tazobactam Sod 3.375 gm/ Miscellaneous Medication 1 each/ 

Sodium Chloride  100 mls @ 200 mls/hr IVPB Q6HR [facility]


   Last Admin: 03/06/18 12:25 Dose:  100 mls


Fentanyl Citrate 2,000 mcg/ (Sodium Chloride)  100 mls @ 0 mls/hr IV INF [facility]; 

Per Protocol


   PRN Reason: Protocol


   Stop: 04/05/18 09:52


Fentanyl Citrate (Fentanyl Bolus)  250 mls @ 0 mls/hr IVPB PRN PRN; As Directed


   PRN Reason: Breakthrough pain


   Stop: 04/05/18 09:52


Sodium Chloride (1/2 Normal Saline)  1,000 mls @ 75 mls/hr IV .[id number] [facility]


Lorazepam (Ativan)  2 mg SLOW IVP Q2H PRN


   PRN Reason: Anxiety to achieve Aguirre 2-3


   Stop: 04/05/18 09:52


   Last Admin: 03/06/18 10:31 Dose:  2 mg


Mineral Oil/White Petrolatum (Lacri-Lube Ointment)  0 gm EA EYE PRN PRN


   PRN Reason: Dry Eyes


Miscellaneous Medication (Pharmacy To Dose)  1 each IVPB ASDIR [facility]


Morphine Sulfate (Morphine Sulfate)  2 mg SLOW IVP Q2H PRN


   PRN Reason: Breakthrough pain


   Stop: 04/05/18 09:52


Pantoprazole Sodium (Protonix)  40 mg IVP Q12HR [facility]


   Last Admin: 03/06/18 10:45 Dose:  40 mg


Propofol (Diprivan)  1,000 mg IV INF PRN; Protocol


   PRN Reason: TO ACHIEVE AGUIRRE SCORE 2-3


   Stop: 04/05/18 09:52


Sodium Chloride (Flush - Normal Saline)  10 ml IVF Q12HR [facility]


   Last Admin: 03/06/18 10:15 Dose:  10 ml


Sodium Chloride (Flush - Normal Saline)  10 ml IVF PRN PRN


   PRN Reason: Saline Flush


   Last Admin: 03/06/18 05:03 Dose:  10 ml


Tamsulosin HCl (Flomax)  0.4 mg PO DAILY [facility]


   Last Admin: 03/06/18 09:53 Dose:  Not Given

## 2018-03-07 LAB
ANION GAP SERPL CALC-SCNC: 9 MMOL/L (ref 10–20)
BASOPHILS # BLD AUTO: 0 THOU/UL (ref 0–0.2)
BASOPHILS NFR BLD AUTO: 0.3 % (ref 0–1)
BUN SERPL-MCNC: 26 MG/DL (ref 8.4–25.7)
CALCIUM SERPL-MCNC: 7.7 MG/DL (ref 7.8–10.44)
CHLORIDE SERPL-SCNC: 102 MMOL/L (ref 98–107)
CK MB SERPL-MCNC: 1.5 NG/ML (ref 0–6.6)
CO2 SERPL-SCNC: 32 MMOL/L (ref 23–31)
CREAT CL PREDICTED SERPL C-G-VRATE: 159 ML/MIN (ref 70–130)
EOSINOPHIL # BLD AUTO: 0.4 THOU/UL (ref 0–0.7)
EOSINOPHIL NFR BLD AUTO: 3.3 % (ref 0–10)
GLUCOSE SERPL-MCNC: 108 MG/DL (ref 80–115)
HGB BLD-MCNC: 8.7 G/DL (ref 14–18)
LYMPHOCYTES # BLD: 0.9 THOU/UL (ref 1.2–3.4)
LYMPHOCYTES NFR BLD AUTO: 7.5 % (ref 21–51)
MCH RBC QN AUTO: 31.7 PG (ref 27–31)
MCV RBC AUTO: 94.1 FL (ref 80–94)
MONOCYTES # BLD AUTO: 0.6 THOU/UL (ref 0.11–0.59)
MONOCYTES NFR BLD AUTO: 5.4 % (ref 0–10)
NEUTROPHILS # BLD AUTO: 9.9 THOU/UL (ref 1.4–6.5)
NEUTROPHILS NFR BLD AUTO: 83.5 % (ref 42–75)
PLATELET # BLD AUTO: 169 THOU/UL (ref 130–400)
POTASSIUM SERPL-SCNC: 4.1 MMOL/L (ref 3.5–5.1)
RBC # BLD AUTO: 2.74 MILL/UL (ref 4.7–6.1)
SODIUM SERPL-SCNC: 139 MMOL/L (ref 136–145)
TROPONIN I SERPL DL<=0.01 NG/ML-MCNC: 0.1 NG/ML (ref ?–0.03)
WBC # BLD AUTO: 11.8 THOU/UL (ref 4.8–10.8)

## 2018-03-07 PROCEDURE — 4A023FZ MEASUREMENT OF CARDIAC RHYTHM, PERCUTANEOUS APPROACH: ICD-10-PCS | Performed by: INTERNAL MEDICINE

## 2018-03-07 PROCEDURE — 4A0234Z MEASUREMENT OF CARDIAC ELECTRICAL ACTIVITY, PERCUTANEOUS APPROACH: ICD-10-PCS | Performed by: INTERNAL MEDICINE

## 2018-03-07 PROCEDURE — 02583ZZ DESTRUCTION OF CONDUCTION MECHANISM, PERCUTANEOUS APPROACH: ICD-10-PCS | Performed by: INTERNAL MEDICINE

## 2018-03-07 RX ADMIN — Medication SCH ML: at 21:00

## 2018-03-07 RX ADMIN — Medication SCH ML: at 08:05

## 2018-03-07 RX ADMIN — ASPIRIN SCH: 81 TABLET ORAL at 08:05

## 2018-03-07 RX ADMIN — PIPERACILLIN SODIUM AND TAZOBACTAM SODIUM SCH MLS: 3; .375 INJECTION, POWDER, LYOPHILIZED, FOR SOLUTION INTRAVENOUS at 05:07

## 2018-03-07 RX ADMIN — PIPERACILLIN SODIUM AND TAZOBACTAM SODIUM SCH MLS: 3; .375 INJECTION, POWDER, LYOPHILIZED, FOR SOLUTION INTRAVENOUS at 11:15

## 2018-03-07 RX ADMIN — PIPERACILLIN SODIUM AND TAZOBACTAM SODIUM SCH MLS: 3; .375 INJECTION, POWDER, LYOPHILIZED, FOR SOLUTION INTRAVENOUS at 00:16

## 2018-03-07 NOTE — PRG
DATE OF SERVICE:  03/07/2018

 

SUBJECTIVE:  Mr. Herbert did require recent intubation with sudden respiratory difficulty and needed EGD
.

 

He is currently on propofol.  He is back in sinus rhythm.

 

OBJECTIVE:

VITAL SIGNS:  Blood pressure 136/62, pulse 76, temperature afebrile.

LUNGS:  Rhonchi, rales bilaterally.

CARDIAC:  Regular rate and rhythm.

ABDOMEN:  Soft, nontender, nondistended.

EXTREMITIES:  1+ pitting edema.

 

PERTINENT LABORATORY DATA:  Hemoglobin 8.7.

 

IMPRESSION:

1.  Atrial flutter.

2.  Hypoxia.

3.  Respiratory failure.

4.  Recent anemia.

5.  Pneumonia.

 

RECOMMENDATIONS:  I would like to have Mr. Herbert more stable prior to proceeding with flutter ablation
.  We will avoid anticoagulation therapy.  He was previously on Lovenox, which has now been discontin
ued.  Continue antibiotic therapy and respiratory support.

## 2018-03-07 NOTE — PRG
DATE OF SERVICE:  03/07/2017

 

SUBJECTIVE:  Mr. Herbert is sedated on the ventilator.

 

OBJECTIVE:

VITAL SIGNS:  Temperature 99.7, blood pressure 179/78, pulse 53.  He apparently had an episode of bra
dycardia earlier this morning and was started on dopamine.

LUNGS:  Clear to auscultation bilaterally.

HEART:  Regular rate and rhythm.

ABDOMEN:  Soft, nondistended.  There is no guarding.  Bowel sounds are present.

EXTREMITIES:  1+ pitting lower extremity edema.

 

LABORATORY DATA:  Hemoglobin 8.7.

 

IMPRESSION:

1.  Gastric ulcer with hemorrhage status post electrocautery of a vessel in the base of the ulcer by 
Dr. Danielle last night.

2.  Anemia of acute blood loss.

 

RECOMMENDATIONS:

1.  Continue IV proton pump inhibitor.

2.  Anticoagulation has been held for now.

3.  Check an H. pylori antibody.

## 2018-03-07 NOTE — OP
DATE OF PROCEDURE:  03/07/2018

 

ELECTROPHYSIOLOGY STUDY AND RADIOFREQUENCY ABLATION REPORT

 

REFERRING PHYSICIAN:  Brijesh Contreras MD

 

REASON FOR PROCEDURE:  Mr. Herbert is a 72-year-old male who had respiratory failure and developed atria
l flutter, which was persisting for days.  His respiratory distress improved, but on heparin he devel
oped GI bleed requiring transfusion.  Eventually, the flutter self-terminated, but he required EGD an
d he needs re-intubation.  We have planned to assess his cardiac electrical function and ablate the c
avotricuspid isthmus hence the typical isthmus-dependent flutter morphology of the atrial flutter injuan liu presenting.

 

DESCRIPTION OF PROCEDURE:  The patient received propofol as baseline and is intubated from before.  T
he right femoral venous area was prepped, draped, and anesthetized using subcutaneous lidocaine and u
ltrasound guidance, the right femoral vein was cannulated.  Two 8-Russian short sheaths were introduce
d.  From this, a decapolar catheter was advanced to the His bundle, RV, RA, and ventured to the CS po
sition.  Pacing, mapping, and recording was performed at each location.  Following that, baseline EP 
recordings were obtained.  The baseline cycle length sinus rhythm was 725 milliseconds.  , QRS 
148, , AH 76, HV 73 milliseconds.  Of note, initial rhythm are having some junctional with inve
rted P waves present.  The sinus node recovery time was 2201.  The corrected sinus node recovery time
 was 1400.  Following that, burst atrial pacing did not induce arrhythmia.  Hence the pre-existing at
rial flutter with typical isthmus-dependent pattern, we proceeded with cavotricuspid isthmus ablation
.  With proximal CS pacing, radiofrequency ablation of the cavotricuspid isthmus was performed.  The 
initial transisthmus time was 60 milliseconds, increased to 140 milliseconds to 160 milliseconds.  Bl
ock was demonstrated through the cavotricuspid block by measuring transisthmus times, which appear to
 be progressively longer measured from the lateral right atrium moving the catheter towards the ablat
ion line on the cavotricuspid isthmus.

 

No recurrent atrial flutter was seen.  In the end of procedure, the pre and post cine of the lateral 
heart border did not show changes.

 

Throughout the procedure, the patient remained on dopamine, hence borderline blood pressure, even bef
ore starting the case.

 

The patient's vital signs remained stable on low-to-medium dose dopamine.

 

CONCLUSION:

1.  Successful cavotricuspid isthmus ablation.

2.  Abnormally prolonged sinus node recovery time suggestive of sinus node disease.

 

PLAN:

1.  Routine postoperative care.

2.  Consider permanent pacing if bradyarrhythmias do not improve with beta blocker washout and still 
requires dopamine.

## 2018-03-07 NOTE — PRG
DATE OF SERVICE:  03/07/2018

 

SERVICE:  Pulmonary Medicine.

 

INTERVAL HISTORY:  The patient is doing outstanding from a respiratory standpoint.  He had a little b
it of hypoxemia yesterday prompting slight increase in FiO2, but this has settled back down.  He has 
increased lower extremity swelling.  Outside of this, there has been no interval change to his condit
ion.  He had an EGD yesterday demonstrating a peptic ulcer with visible vessel.  This was treated.  T
here is no active bleeding that was identified.  The EP study has been postponed until today.  The lorelei ramesh cannot provide any additional elements of the history because he is currently sedated, but ther
e are otherwise no events overnight.

 

PHYSICAL EXAMINATION:

VITAL SIGNS:  Afebrile, pulse 75, blood pressure 125/51, respirations 23, saturations 91% on 41% FIO2
 and a PEEP of 5.

GENERAL:  Patient is intubated and sedated.

HEENT:  Normocephalic, atraumatic.  Sclerae are white, conjunctivae pink.  Oral mucosa is moist witho
ut lesions.

LUNGS:  Decent air entry, which is improving.  There is a prolonged expiratory phase.  I do not hear 
wheezing.  Rhonchi are present.  No crackles.

HEART:  Normal rate, regular.

ABDOMEN:  Soft, nontender, nondistended.  Bowel sounds are positive.

MUSCULOSKELETAL:  No cyanosis or clubbing.  There is 1+ pitting in the bilateral lower extremities.

NEUROLOGIC:  Grossly nonfocal.

GENITOURINARY:  Sommer catheter in place.

 

LABORATORY DATA:  WBC 11.8, hemoglobin 8.7 and gently down trending, platelets 169,000.  Basic metabo
lic profile is essentially unremarkable/stable.  Troponin is 0.095.  Culture results are negative to 
date.

 

ASSESSMENT:

1.  Acute on chronic hypoxic and hypercapnic respiratory failure.

2.  Chronic diastolic heart failure, currently euvolemic.

3.  Atrial flutter with rapid ventricular response, returned to sinus rhythm.

4.  Acute blood loss anemia secondary to peptic ulcer disease.

5.  Severe sepsis, resolved.

6.  Community-acquired pneumonia, resolved.

7.  Osteoarthritis of the hips, bilateral.

8.  Debility, severe.

 

DISCUSSION AND PLAN:  After EP is done with the patient, we will put him on a spontaneous breathing t
rial and consider extubation.  Pulmonary Critical Care will continue to follow along.  IV fluids are 
going to be interrupted as the patient is a little bit volume up.  We will start a dose of Lasix eve
rrow morning.

## 2018-03-07 NOTE — OP
DATE OF PROCEDURE:  03/06/2018

 

GI ENDOSCOPY NOTE

 

SURGEON:  Froy Danielle MD

 

ASSISTANT SURGEON:  None.

 

PROCEDURE:  Esophagogastroduodenoscopy with submucosal injection and cautery of gastric ulcer bed.

 

INDICATION:

1.  Upper gastrointestinal bleeding.

2.  Acute blood loss anemia.

 

MEDICATIONS:  See anesthesia record.

 

FINDINGS:  After discussion of the risks, benefits, and alternatives of the procedure, informed conse
nt was obtained and verified.  Pre-endoscopic cardiopulmonary examination was satisfactory.  Timeout 
was performed before the procedure was started.

 

PROCEDURE IN DETAIL:  Procedure was performed with anesthesia assistance.  The patient was endotrache
ally intubated in the ICU.  The patient was kept in the supine position due to his orthopedic issues.
  A Pentax adult upper endoscope was placed into the oropharynx and passed through the cricopharyngeu
s under direct visualization.  The esophageal mucosa appeared normal throughout.  There was no eviden
ce of any esophageal varices.  The endoscope was then advanced into the stomach.  Forward and retrofl
exed views of the gastric mucosa were obtained.  There was a large amount of food particles as well a
s some old blood clots within the gastric fundus, and despite extensive attempts to suction this out,
 I was unable to completely clear the gastric fundus; however, there was no red blood.  No evidence o
f active bleeding at this time.  I was able to clear the rest of the stomach except for the proximal 
fundus and the area surrounding the GE junction.  In the lesser curvature of the stomach and the prox
imal gastric body, there was a large adherent clot with some oozing around the base of this clot.  Th
is is in a region of severe erosive gastritis, but this was certainly the largest clot and as it was 
adherent, it likely represents the source of his recent bleeding.  We performed submucosal injection 
in 4-quadrant fashion around the base of this clot.  In this fashion, we injected 8 mL of 1:10,000 ep
inephrine.  Hemostasis was maintained.  We then carefully lifted the blood clot off the ulcer bed.  O
nce the blood clot was removed, there was a small visible vessel visualized.  I then used a 10-Mosotho
 bipolar probe to cauterize the ulcer bed.  Hemostasis was maintained.  Examination of the duodenum w
as unremarkable.  The gastric antrum was relatively unaffected, though there were some erosions aroun
d the pylorus.  The upper endoscope was then completely withdrawn and the patient was allowed to porter
chris.  The patient tolerated the procedure well.  There were no immediate post-procedure complications
.

 

IMPRESSION:

1.  1-cm ulcer with adherent clot in the lesser curvature of the stomach.  Injected with 8 mL of subm
ucosal epinephrine and the clot was lifted to demonstrate small visible vessel.  Visible vessel was c
auterized with 10-Mosotho bipolar probe with hemostasis maintained.

2.  Severe erosive gastritis in the fundus and body.

3.  Old blood and food in the gastric fundus, unable to completely clear the gastric fundus.

4.  No active bleeding on this exam.

5.  Normal duodenum.

6.  Normal esophagus.

 

RECOMMENDATIONS:

1.  Continue the IV proton pump inhibitor.

2.  Avoid anticoagulation if possible.

## 2018-03-08 LAB
ANION GAP SERPL CALC-SCNC: 9 MMOL/L (ref 10–20)
ANION GAP SERPL CALC-SCNC: 9 MMOL/L (ref 10–20)
APTT PPP: 30.6 SEC (ref 22.9–36.1)
BASOPHILS # BLD AUTO: 0 THOU/UL (ref 0–0.2)
BASOPHILS # BLD AUTO: 0 THOU/UL (ref 0–0.2)
BASOPHILS NFR BLD AUTO: 0.1 % (ref 0–1)
BASOPHILS NFR BLD AUTO: 0.3 % (ref 0–1)
BUN SERPL-MCNC: 17 MG/DL (ref 8.4–25.7)
BUN SERPL-MCNC: 19 MG/DL (ref 8.4–25.7)
CALCIUM SERPL-MCNC: 7.4 MG/DL (ref 7.8–10.44)
CALCIUM SERPL-MCNC: 7.6 MG/DL (ref 7.8–10.44)
CHLORIDE SERPL-SCNC: 103 MMOL/L (ref 98–107)
CHLORIDE SERPL-SCNC: 104 MMOL/L (ref 98–107)
CO2 SERPL-SCNC: 30 MMOL/L (ref 23–31)
CO2 SERPL-SCNC: 31 MMOL/L (ref 23–31)
CREAT CL PREDICTED SERPL C-G-VRATE: 164 ML/MIN (ref 70–130)
CREAT CL PREDICTED SERPL C-G-VRATE: 170 ML/MIN (ref 70–130)
EOSINOPHIL # BLD AUTO: 0.2 THOU/UL (ref 0–0.7)
EOSINOPHIL # BLD AUTO: 0.3 THOU/UL (ref 0–0.7)
EOSINOPHIL NFR BLD AUTO: 2.2 % (ref 0–10)
EOSINOPHIL NFR BLD AUTO: 3.8 % (ref 0–10)
GLUCOSE SERPL-MCNC: 93 MG/DL (ref 80–115)
GLUCOSE SERPL-MCNC: 95 MG/DL (ref 80–115)
HGB BLD-MCNC: 7.5 G/DL (ref 14–18)
HGB BLD-MCNC: 9.2 G/DL (ref 14–18)
INR PPP: 1.3
LYMPHOCYTES # BLD: 0.5 THOU/UL (ref 1.2–3.4)
LYMPHOCYTES # BLD: 0.7 THOU/UL (ref 1.2–3.4)
LYMPHOCYTES NFR BLD AUTO: 6.7 % (ref 21–51)
LYMPHOCYTES NFR BLD AUTO: 8.1 % (ref 21–51)
MCH RBC QN AUTO: 31.7 PG (ref 27–31)
MCH RBC QN AUTO: 32.1 PG (ref 27–31)
MCV RBC AUTO: 95.3 FL (ref 80–94)
MCV RBC AUTO: 95.7 FL (ref 80–94)
MONOCYTES # BLD AUTO: 0.5 THOU/UL (ref 0.11–0.59)
MONOCYTES # BLD AUTO: 0.7 THOU/UL (ref 0.11–0.59)
MONOCYTES NFR BLD AUTO: 6.3 % (ref 0–10)
MONOCYTES NFR BLD AUTO: 7.8 % (ref 0–10)
NEUTROPHILS # BLD AUTO: 6.5 THOU/UL (ref 1.4–6.5)
NEUTROPHILS # BLD AUTO: 6.7 THOU/UL (ref 1.4–6.5)
NEUTROPHILS NFR BLD AUTO: 80.2 % (ref 42–75)
NEUTROPHILS NFR BLD AUTO: 84.5 % (ref 42–75)
PLATELET # BLD AUTO: 186 THOU/UL (ref 130–400)
PLATELET # BLD AUTO: 186 THOU/UL (ref 130–400)
POTASSIUM SERPL-SCNC: 3.5 MMOL/L (ref 3.5–5.1)
POTASSIUM SERPL-SCNC: 3.5 MMOL/L (ref 3.5–5.1)
PROTHROMBIN TIME: 15.9 SEC (ref 12–14.7)
RBC # BLD AUTO: 2.36 MILL/UL (ref 4.7–6.1)
RBC # BLD AUTO: 2.87 MILL/UL (ref 4.7–6.1)
SODIUM SERPL-SCNC: 138 MMOL/L (ref 136–145)
SODIUM SERPL-SCNC: 140 MMOL/L (ref 136–145)
WBC # BLD AUTO: 7.7 THOU/UL (ref 4.8–10.8)
WBC # BLD AUTO: 8.4 THOU/UL (ref 4.8–10.8)

## 2018-03-08 PROCEDURE — 02H63JZ INSERTION OF PACEMAKER LEAD INTO RIGHT ATRIUM, PERCUTANEOUS APPROACH: ICD-10-PCS | Performed by: INTERNAL MEDICINE

## 2018-03-08 PROCEDURE — 0JH606Z INSERTION OF PACEMAKER, DUAL CHAMBER INTO CHEST SUBCUTANEOUS TISSUE AND FASCIA, OPEN APPROACH: ICD-10-PCS | Performed by: INTERNAL MEDICINE

## 2018-03-08 PROCEDURE — 02HK3JZ INSERTION OF PACEMAKER LEAD INTO RIGHT VENTRICLE, PERCUTANEOUS APPROACH: ICD-10-PCS | Performed by: INTERNAL MEDICINE

## 2018-03-08 RX ADMIN — Medication PRN ML: at 20:20

## 2018-03-08 RX ADMIN — Medication SCH ML: at 09:22

## 2018-03-08 RX ADMIN — Medication SCH ML: at 20:20

## 2018-03-08 RX ADMIN — ASPIRIN SCH: 81 TABLET ORAL at 08:45

## 2018-03-08 NOTE — EKG
Test Reason : 

Blood Pressure : ***/*** mmHG

Vent. Rate : 078 BPM     Atrial Rate : 078 BPM

   P-R Int : 162 ms          QRS Dur : 140 ms

    QT Int : 436 ms       P-R-T Axes : 270 -80 057 degrees

   QTc Int : 497 ms

 

Unusual P axis, possible ectopic atrial rhythm

Right bundle branch block

Left anterior fascicular block

*** Bifascicular block ***

Abnormal ECG

When compared with ECG of 02-MAR-2018 13:27,

Ectopic atrial rhythm has replaced Atrial flutter

Confirmed by DR. EUN ROCHA ([id number]) on 3/8/2018 5:38:21 PM

 

Referred By:  MARIO           Confirmed By:DR. EUN ROCHA

## 2018-03-08 NOTE — EKG
Test Reason : STAT

Blood Pressure : ***/*** mmHG

Vent. Rate : 088 BPM     Atrial Rate : 088 BPM

   P-R Int : 234 ms          QRS Dur : 148 ms

    QT Int : 410 ms       P-R-T Axes : 269 -71 -21 degrees

   QTc Int : 496 ms

 

Unusual P axis, possible ectopic atrial rhythm

Right bundle branch block

Left anterior fascicular block

*** Bifascicular block ***

Abnormal ECG

When compared with ECG of 07-MAR-2018 10:47, (Unconfirmed)

Nonspecific T wave abnormality, worse in Inferior leads

Nonspecific T wave abnormality, worse in Lateral leads

Confirmed by DR. EUN ROCHA ([id number]) on 3/8/2018 5:44:04 PM

 

Referred By:             Confirmed By:DR. EUN ROCHA

## 2018-03-08 NOTE — RAD
CHEST ONE VIEW

3/8/18

 

HISTORY: 

Pacemaker placement.

 

COMPARISON:  

Radiograph 3/6/18.

 

FINDINGS:  

The patient appears to be intubated. Endotracheal tube tip above the navi approximately 3 cm. Layer
ing bilateral pleural effusions. 

 

New pacer is in place, dual lead, with tips in the right atrium and right ventricle. Mild portal veno
us congestion. 

 

IMPRESSION:  

No complication after dual lead pacer placement. 

 

POS: TALIA

## 2018-03-08 NOTE — PRG
DATE OF SERVICE:  03/08/2018

 

SERVICE:  Pulmonary Medicine.

 

INTERVAL HISTORY:  The patient is doing great from a respiratory standpoint.  Yesterday he got ablate
d.  Overnight, he developed a bradycardia.  He required dopamine once again.  As such, he is being pl
anned for a pacemaker today.

 

OBJECTIVE:

VITAL SIGNS:  Afebrile, pulse 79, blood pressure 115/45, respirations 20, saturation 89% on 41% FiO2 
and a PEEP of 5.

GENERAL:  The patient is intubated and sedated.

HEENT:  Normocephalic, atraumatic.  Sclerae are white, conjunctivae pink.  Oral mucosa is moist witho
ut lesions.

LUNGS:  Decent air entry.  There is a slightly prolonged expiratory phase, but no wheezing, rhonchi, 
or crackles are appreciated.

HEART:  Normal rate, regular.

ABDOMEN:  Soft, nontender, nondistended.  Bowel sounds are positive.

MUSCULOSKELETAL:  No cyanosis or clubbing.  There is diffuse 1-2+ pitting throughout.

GENITOURINARY:  No Sommer.

NEUROLOGIC:  Grossly nonfocal.

 

LABORATORY DATA:  WBC 7.7, hemoglobin 9.2, platelets 186,000.  Basic metabolic profile is completely 
unremarkable with an anion gap of 9 and calcium 7.6.

 

ASSESSMENT:

1.  Acute on chronic hypoxic and hypercapnic respiratory failure.

2.  Acute on chronic diastolic heart failure.

3.  Atrial flutter with rapid ventricular response, status post ablation and returned to normal sinus
 rhythm.

4.  Bradyarrhythmia, status post pacemaker placement, on scheduled for pacemaker placement today.

5.  Acute blood loss anemia secondary to peptic ulcer disease.

6.  Community-acquired pneumonia, resolved.

7.  Severe sepsis, resolved.

8.  Osteoarthritis of the bilateral hips, severe.

9.  Debility, severe.

 

DISCUSSION AND PLAN:  Because we are not going to have the pacemaker placed until late this afternoon
, we will extubate him hopefully first thing tomorrow morning.  Pulmonary Critical Care will continue
 to follow while the patient remains in this location.

 

CRITICAL CARE TIME:  30 minutes.

## 2018-03-08 NOTE — PRG
DATE OF SERVICE:  03/08/2018

 

SUBJECTIVE:  Mr. Herbert has had no significant stool output over the last couple of days.  His hemoglob
in was noted to be lower this morning and he received a unit of blood.  He has undergone heart ablati
on and pacemaker placement.

 

OBJECTIVE:  Pulse 73, blood pressure 118/58, temperature 98.7.

 

LABORATORY DATA:  Hemoglobin was 7.5 this morning, but after 1 unit of transfusion increased to 9.2.

 

IMPRESSION:

1.  Anemia of acute blood loss.

2.  Gastrointestinal bleed secondary to gastric ulcer, status post cautery of the vessel in the base 
of the gastric ulcer.  He has had no further significant overt bleeding since the procedure.  His hem
oglobin was slightly lower today, but responded more than would be expected with a single unit of blo
od.  Does not appear to be actively bleeding at this point.

 

RECOMMENDATIONS:

1.  Pantoprazole 40 mg twice daily, this can be weaned back to once daily after a week.

2.  I will sign off for now.  Please call if GI can be of assistance.

3.  Helicobacter pylori antibody is pending.

## 2018-03-08 NOTE — EKG
Test Reason : 

Blood Pressure : ***/*** mmHG

Vent. Rate : 074 BPM     Atrial Rate : 074 BPM

   P-R Int : 162 ms          QRS Dur : 148 ms

    QT Int : 450 ms       P-R-T Axes : 256 -63 014 degrees

   QTc Int : 499 ms

 

Unusual P axis, possible ectopic atrial rhythm

Right bundle branch block

Left anterior fascicular block

*** Bifascicular block ***

Abnormal ECG

When compared with ECG of 06-MAR-2018 19:30, (Unconfirmed)

T wave amplitude has decreased in Lateral leads

Confirmed by DR. EUN ROCHA ([id number]) on 3/8/2018 5:40:51 PM

 

Referred By:  [facility]           Confirmed By:DR. EUN ROCHA

## 2018-03-08 NOTE — PDOC.PN
- Subjective


Encounter Start Date: 03/07/18


Encounter Start Time: 13:00


-: non-verbal





Pt is intubated and Sedated, So non verbal a this time. 





- Objective


Resuscitation Status: 


 











Resuscitation Status           FULL:Full Resuscitation














MAR Reviewed: Yes


Vital Signs & Weight: 


 Vital Signs (12 hours)











  Temp Pulse Resp BP


 


 03/08/18 06:34   70  


 


 03/08/18 06:00    15 


 


 03/08/18 04:00  99.8 F H   15 


 


 03/08/18 02:17   71   129/64


 


 03/08/18 02:00    19 


 


 03/08/18 00:13   66   107/52 L


 


 03/08/18 00:00  99.7 F H   14 


 


 03/07/18 22:24   71   115/48 L


 


 03/07/18 22:00    15 








 Weight











Admit Weight                   260 lb


 


Weight                         252 lb 13.923 oz











 Most Recent Monitor Data











Heart Rate from ECG            69


 


NIBP                           120/58


 


NIBP BP-Mean                   83


 


Respiration from ECG           14


 


SpO2                           95














I&O: 


 











 03/07/18 03/08/18 03/09/18





 06:59 06:59 06:59


 


Intake Total 2753 1497 


 


Output Total 1790 1810 


 


Balance 963 -313 











Result Diagrams: 


 03/08/18 05:13





 03/08/18 05:13


Radiology Reviewed by me: Yes





Phys Exam





- Physical Examination


Neck: no nodes, no JVD


Respiratory: wheezing present


Cardiovascular: no significant murmur, irregular


Gastrointestinal: soft, non-tender


Musculoskeletal: pulses present, edema present


Lymphatic: no nodes


Skin: no rash, normal turgor





Dx/Plan


(1) Melena


Code(s): K92.1 - MELENA   Status: Acute   Comment: Patient dropped Hb from 13 

to 11 and not 8.7, GI found bleeding ulcer in gastric fundus on EGD, will 

continue patiebnt on protonix drip. Follow GI recommedations   





(2) Acute exacerbation of CHF (congestive heart failure)


Code(s): I50.9 - HEART FAILURE, UNSPECIFIED   Status: Acute   


Qualifiers: 


   Heart failure type: diastolic   Qualified Code(s): I50.33 - Acute on chronic 

diastolic (congestive) heart failure   


Comment: ef of 50%, Cardilogy Following, COntinue with ACEI/ BB.   





(3) Acute respiratory failure with hypoxia


Code(s): J96.01 - ACUTE RESPIRATORY FAILURE WITH HYPOXIA   Status: Acute   

Comment: improving. Pulmonary Following, pt on intermittant Bipap, very labile. 

Continue Nebs    





(4) Atrial flutter


Code(s): I48.92 - UNSPECIFIED ATRIAL FLUTTER   Status: Acute   


Qualifiers: 


   Atrial flutter type: typical   Qualified Code(s): I48.3 - Typical atrial 

flutter   


Comment: in sinus rhythm, Follow Dr. Tang Recommedations, plans for Ablation 

later this afternoon.   





(5) Morbid obesity


Code(s): E66.01 - MORBID (SEVERE) OBESITY DUE TO EXCESS CALORIES   Status: 

Acute   





(6) PNA (pneumonia)


Code(s): J18.9 - PNEUMONIA, UNSPECIFIED ORGANISM   Status: Acute   Comment: cap

, Continue with IV antibiotics per pulmonary.   





(7) Sepsis


Code(s): A41.9 - SEPSIS, UNSPECIFIED ORGANISM   Status: Acute   


Qualifiers: 


   Sepsis type: sepsis due to unspecified organism   Qualified Code(s): A41.9 - 

Sepsis, unspecified organism   


Comment: Improving.    





(8) HTN (hypertension)


Code(s): I10 - ESSENTIAL (PRIMARY) HYPERTENSION   Status: Chronic   


Qualifiers: 


   Hypertension type: essential hypertension   Qualified Code(s): I10 - 

Essential (primary) hypertension   





- Plan


cont current plan of care, granados catheter, continue antibiotics, DVT proph w/

SCDs





* .








- Discharge Day


Encounter end time: 13:35





Review of Systems





- Review of Systems


Other: 





Unable to Obtain ROS due to pt being intubated.





- Medications/Allergies


Allergies/Adverse Reactions: 


 Allergies











Allergy/AdvReac Type Severity Reaction Status Date / Time


 


No Known Drug Allergies Allergy   Verified 02/26/18 14:53











Medications: 


 Current Medications





Acetaminophen (Tylenol)  650 mg PO Q4H PRN


   PRN Reason: Headache/Fever or Pain


   Last Admin: 03/03/18 07:42 Dose:  650 mg


Albuterol/Ipratropium (Duoneb)  3 ml NEB [id number] [facility]


   Last Admin: 03/08/18 06:32 Dose:  3 ml


Aspirin (Ecotrin)  81 mg PO DAILY [facility]


   Last Admin: 03/07/18 08:05 Dose:  Not Given


Docusate Sodium (Colace)  100 mg PO BID [facility]


   Last Admin: 03/07/18 21:00 Dose:  Not Given


Dutasteride (Avodart)  0.5 mg PO DAILY [facility]


   Last Admin: 03/07/18 08:05 Dose:  Not Given


Furosemide (Lasix)  40 mg SLOW IVP 0600 [facility]


   Last Admin: 03/08/18 04:58 Dose:  40 mg


Guaifenesin (Mucinex)  1,200 mg PO Q12HR [facility]


   Last Admin: 03/07/18 21:00 Dose:  Not Given


Fentanyl Citrate 2,000 mcg/ (Sodium Chloride)  100 mls @ 0 mls/hr IV INF [facility]; 

Per Protocol


   PRN Reason: Protocol


   Stop: 04/05/18 09:52


Fentanyl Citrate (Fentanyl Bolus)  250 mls @ 0 mls/hr IVPB PRN PRN; As Directed


   PRN Reason: Breakthrough pain


   Stop: 04/05/18 09:52


Sodium Chloride (1/2 Normal Saline)  1,000 mls @ 0 mls/hr IV .Q0M [facility]


   PRN Reason: KVO


Dopamine HCl/Dextrose (Dopamine/D5w)  250 mls @ 0 mls/hr IVPB INF [facility]; As 

Directed


   PRN Reason: Protocol


   Last Admin: 03/08/18 04:38 Dose:  250 mls


Mineral Oil/White Petrolatum (Lacri-Lube Ointment)  0 gm EA EYE PRN PRN


   PRN Reason: Dry Eyes


Morphine Sulfate (Morphine Sulfate)  2 mg SLOW IVP Q4H PRN


   PRN Reason: Pain


   Last Admin: 03/08/18 04:38 Dose:  2 mg


Pantoprazole Sodium (Protonix)  40 mg IVP Q12HR [facility]


   Last Admin: 03/07/18 21:00 Dose:  40 mg


Propofol (Diprivan)  1,000 mg IV INF PRN; Protocol


   PRN Reason: TO ACHIEVE AGUIRRE SCORE 2-3


   Stop: 04/05/18 09:52


   Last Admin: 03/08/18 04:56 Dose:  1,000 mg


Sodium Chloride (Flush - Normal Saline)  10 ml IVF Q12HR [facility]


   Last Admin: 03/07/18 21:00 Dose:  10 ml


Sodium Chloride (Flush - Normal Saline)  10 ml IVF PRN PRN


   PRN Reason: Saline Flush


   Last Admin: 03/06/18 05:03 Dose:  10 ml


Tamsulosin HCl (Flomax)  0.4 mg PO DAILY [facility]


   Last Admin: 03/07/18 08:05 Dose:  Not Given

## 2018-03-08 NOTE — PDOC.PN
- Subjective


Encounter Start Date: 03/08/18


Encounter Start Time: 12:00


Patient is intubated but off of sedation, is alert now, discussed with Family 

at bedside, Explained about the plan for pacemaker





- Objective


Resuscitation Status: 


 











Resuscitation Status           FULL:Full Resuscitation














MAR Reviewed: Yes


Vital Signs & Weight: 


 Vital Signs (12 hours)











  Temp Pulse Resp Pulse Ox


 


 03/08/18 14:00    15 


 


 03/08/18 13:18   72  


 


 03/08/18 12:00    15 


 


 03/08/18 10:00    18 


 


 03/08/18 09:59   74  


 


 03/08/18 09:50  99.1 F   18  93 L


 


 03/08/18 09:37  99.1 F   


 


 03/08/18 09:27  99.1 F   15  93 L


 


 03/08/18 08:00  99.0 F  72  18  93 L


 


 03/08/18 06:34   70  


 


 03/08/18 06:00    15 


 


 03/08/18 04:00  99.8 F H   15 








 Weight











Admit Weight                   260 lb


 


Weight                         252 lb 13.923 oz











 Most Recent Monitor Data











Heart Rate from ECG            80


 


NIBP                           138/62


 


NIBP BP-Mean                   95


 


Respiration from ECG           24


 


SpO2                           93














I&O: 


 











 03/07/18 03/08/18 03/09/18





 06:59 06:59 06:59


 


Intake Total 2753 1497 401


 


Output Total 1790 1810 610


 


Balance 963 -313 -209











Result Diagrams: 


 03/08/18 12:48





 03/08/18 12:48


Radiology Reviewed by me: Yes


EKG Reviewed by me: Yes





Phys Exam





- Physical Examination


HEENT: PERRLA, moist MMs


Neck: no nodes, no JVD


Respiratory: no rales, wheezing present


Cardiovascular: RRR, no significant murmur


Gastrointestinal: soft, non-tender


Musculoskeletal: pulses present, edema present


Neurological: non-focal, normal sensation


Lymphatic: no nodes


Psychiatric: normal affect


Skin: no rash, normal turgor





Dx/Plan


(1) Melena


Code(s): K92.1 - MELENA   Status: Acute   Comment: Patient dropped Hb from 13 

to 11 and now 7.3 Pt iss tarted on PRBC transfusion 1 unit, GI found bleeding 

ulcer in gastric fundus on EGD, will continue patiebnt on protonix drip. Follow 

GI recommedations   





(2) Acute exacerbation of CHF (congestive heart failure)


Code(s): I50.9 - HEART FAILURE, UNSPECIFIED   Status: Acute   


Qualifiers: 


   Heart failure type: diastolic   Qualified Code(s): I50.33 - Acute on chronic 

diastolic (congestive) heart failure   


Comment: ef of 50%, Cardilogy Following, COntinue with ACEI/ BB.   





(3) Acute respiratory failure with hypoxia


Code(s): J96.01 - ACUTE RESPIRATORY FAILURE WITH HYPOXIA   Status: Acute   

Comment: improving. Pulmonary Following, pt on intermittant Bipap, very labile. 

Continue Nebs    





(4) Atrial flutter


Code(s): I48.92 - UNSPECIFIED ATRIAL FLUTTER   Status: Acute   


Qualifiers: 


   Atrial flutter type: typical   Qualified Code(s): I48.3 - Typical atrial 

flutter   


Comment: in sinus rhythm, Follow Dr. Tang Recommedations,  Ablation was 

successful, Now plans for pacemaker for non fucntioing of Sinus node..   





(5) Morbid obesity


Code(s): E66.01 - MORBID (SEVERE) OBESITY DUE TO EXCESS CALORIES   Status: 

Acute   





(6) PNA (pneumonia)


Code(s): J18.9 - PNEUMONIA, UNSPECIFIED ORGANISM   Status: Acute   Comment: cap

, Continue with IV antibiotics per pulmonary.   





(7) Sepsis


Code(s): A41.9 - SEPSIS, UNSPECIFIED ORGANISM   Status: Acute   


Qualifiers: 


   Sepsis type: sepsis due to unspecified organism   Qualified Code(s): A41.9 - 

Sepsis, unspecified organism   


Comment: Improving.    





(8) HTN (hypertension)


Code(s): I10 - ESSENTIAL (PRIMARY) HYPERTENSION   Status: Chronic   


Qualifiers: 


   Hypertension type: essential hypertension   Qualified Code(s): I10 - 

Essential (primary) hypertension   





- Plan


cont current plan of care, plan discussed w/ family, continue antibiotics, PT/OT

, respiratory therapy, incentive spirometry, DVT proph w/SCDs





* .








- Discharge Day


Encounter end time: 12:35





Review of Systems





- Review of Systems


Other: 


Patient remains intubated.





- Medications/Allergies


Allergies/Adverse Reactions: 


 Allergies











Allergy/AdvReac Type Severity Reaction Status Date / Time


 


No Known Drug Allergies Allergy   Verified 02/26/18 14:53











Medications: 


 Current Medications





Acetaminophen (Tylenol)  650 mg PO Q4H PRN


   PRN Reason: Headache/Fever or Pain


   Last Admin: 03/03/18 07:42 Dose:  650 mg


Albuterol/Ipratropium (Duoneb)  3 ml NEB [id number] [facility]


   Last Admin: 03/08/18 13:18 Dose:  3 ml


Aspirin (Ecotrin)  81 mg PO DAILY [facility]


   Last Admin: 03/08/18 08:45 Dose:  Not Given


Cefazolin Sodium (Ancef)  2 gm SLOW IVP WILLCALL [facility]


   Stop: 03/08/18 18:00


Docusate Sodium (Colace)  100 mg PO BID [facility]


   Last Admin: 03/08/18 08:45 Dose:  Not Given


Dutasteride (Avodart)  0.5 mg PO DAILY [facility]


   Last Admin: 03/08/18 08:45 Dose:  Not Given


Furosemide (Lasix)  40 mg SLOW IVP 0600 [facility]


   Last Admin: 03/08/18 04:58 Dose:  40 mg


Guaifenesin (Mucinex)  1,200 mg PO Q12HR [facility]


   Last Admin: 03/08/18 08:45 Dose:  Not Given


Fentanyl Citrate 2,000 mcg/ (Sodium Chloride)  100 mls @ 0 mls/hr IV INF TOMEKA; 

Per Protocol


   PRN Reason: Protocol


   Stop: 04/05/18 09:52


Fentanyl Citrate (Fentanyl Bolus)  250 mls @ 0 mls/hr IVPB PRN PRN; As Directed


   PRN Reason: Breakthrough pain


   Stop: 04/05/18 09:52


Sodium Chloride (1/2 Normal Saline)  1,000 mls @ 0 mls/hr IV .Q0M [facility]


   PRN Reason: KVO


Dopamine HCl/Dextrose (Dopamine/D5w)  250 mls @ 0 mls/hr IVPB INF TOMEKA; As 

Directed


   PRN Reason: Protocol


   Last Admin: 03/08/18 04:38 Dose:  250 mls


Mineral Oil/White Petrolatum (Lacri-Lube Ointment)  0 gm EA EYE PRN PRN


   PRN Reason: Dry Eyes


Pantoprazole Sodium (Protonix)  40 mg IVP Q12HR [facility]


   Last Admin: 03/08/18 09:22 Dose:  40 mg


Propofol (Diprivan)  1,000 mg IV INF PRN; Protocol


   PRN Reason: TO ACHIEVE AGUIRRE SCORE 2-3


   Stop: 04/05/18 09:52


   Last Admin: 03/08/18 04:56 Dose:  1,000 mg


Sodium Chloride (Flush - Normal Saline)  10 ml IVF Q12HR [facility]


   Last Admin: 03/08/18 09:22 Dose:  10 ml


Sodium Chloride (Flush - Normal Saline)  10 ml IVF PRN PRN


   PRN Reason: Saline Flush


   Last Admin: 03/06/18 05:03 Dose:  10 ml


Tamsulosin HCl (Flomax)  0.4 mg PO DAILY [facility]


   Last Admin: 03/08/18 08:45 Dose:  Not Given

## 2018-03-08 NOTE — EKG
Test Reason : 

Blood Pressure : ***/*** mmHG

Vent. Rate : 074 BPM     Atrial Rate : 074 BPM

   P-R Int : 166 ms          QRS Dur : 148 ms

    QT Int : 458 ms       P-R-T Axes : 252 -59 034 degrees

   QTc Int : 508 ms

 

Unusual P axis, possible ectopic atrial rhythm

Right bundle branch block

Left anterior fascicular block

*** Bifascicular block ***

Abnormal ECG

When compared with ECG of 07-MAR-2018 18:26, (Unconfirmed)

Nonspecific T wave abnormality no longer evident in Inferior leads

Nonspecific T wave abnormality, improved in Lateral leads

Confirmed by DR. EUN ROCHA ([id number]) on 3/8/2018 5:44:48 PM

 

Referred By:  [facility]           Confirmed By:DR. EUN ROCHA

## 2018-03-09 LAB
ANION GAP SERPL CALC-SCNC: 10 MMOL/L (ref 10–20)
BASOPHILS # BLD AUTO: 0 THOU/UL (ref 0–0.2)
BASOPHILS NFR BLD AUTO: 0.3 % (ref 0–1)
BUN SERPL-MCNC: 18 MG/DL (ref 8.4–25.7)
CALCIUM SERPL-MCNC: 7.6 MG/DL (ref 7.8–10.44)
CHLORIDE SERPL-SCNC: 103 MMOL/L (ref 98–107)
CO2 SERPL-SCNC: 28 MMOL/L (ref 23–31)
CREAT CL PREDICTED SERPL C-G-VRATE: 162 ML/MIN (ref 70–130)
EOSINOPHIL # BLD AUTO: 0.1 THOU/UL (ref 0–0.7)
EOSINOPHIL NFR BLD AUTO: 1.9 % (ref 0–10)
GLUCOSE SERPL-MCNC: 76 MG/DL (ref 80–115)
HGB BLD-MCNC: 8.8 G/DL (ref 14–18)
LYMPHOCYTES # BLD: 0.8 THOU/UL (ref 1.2–3.4)
LYMPHOCYTES NFR BLD AUTO: 10.2 % (ref 21–51)
MCH RBC QN AUTO: 31.7 PG (ref 27–31)
MCV RBC AUTO: 93.5 FL (ref 80–94)
MONOCYTES # BLD AUTO: 0.6 THOU/UL (ref 0.11–0.59)
MONOCYTES NFR BLD AUTO: 7.8 % (ref 0–10)
NEUTROPHILS # BLD AUTO: 5.8 THOU/UL (ref 1.4–6.5)
NEUTROPHILS NFR BLD AUTO: 79.8 % (ref 42–75)
PLATELET # BLD AUTO: 173 THOU/UL (ref 130–400)
POTASSIUM SERPL-SCNC: 3.4 MMOL/L (ref 3.5–5.1)
RBC # BLD AUTO: 2.78 MILL/UL (ref 4.7–6.1)
SODIUM SERPL-SCNC: 138 MMOL/L (ref 136–145)
WBC # BLD AUTO: 7.3 THOU/UL (ref 4.8–10.8)

## 2018-03-09 RX ADMIN — Medication SCH ML: at 09:30

## 2018-03-09 RX ADMIN — Medication PRN ML: at 22:20

## 2018-03-09 RX ADMIN — Medication SCH ML: at 21:30

## 2018-03-09 RX ADMIN — ASPIRIN SCH: 81 TABLET ORAL at 14:39

## 2018-03-09 NOTE — PDOC.PN
- Subjective


Encounter Start Date: 03/09/18


Encounter Start Time: 16:00





Pt is Seen today, extubated, Alert and oriented. No other concernes noted.





- Objective


Resuscitation Status: 


 











Resuscitation Status           FULL:Full Resuscitation














Vital Signs & Weight: 


 Vital Signs (12 hours)











  Temp Pulse Resp BP Pulse Ox


 


 03/09/18 16:00  98.4 F    


 


 03/09/18 14:29   79  23 H   90 L


 


 03/09/18 11:00  97.7 F    


 


 03/09/18 09:56   76  25 H   89 L


 


 03/09/18 08:00  99.3 F  71  23 H  


 


 03/09/18 07:46   74   101/53 L 


 


 03/09/18 07:44   72  21 H   94 L


 


 03/09/18 06:00    21 H  








 Weight











Admit Weight                   260 lb


 


Weight                         250 lb 14.177 oz











 Most Recent Monitor Data











Heart Rate from ECG            78


 


NIBP                           137/59


 


NIBP BP-Mean                   68


 


Respiration from ECG           20


 


SpO2                           90














I&O: 


 











 03/08/18 03/09/18 03/10/18





 06:59 06:59 06:59


 


Intake Total 1497 1679 417.8


 


Output Total 1810 1665 555


 


Balance -313 14 -137.2











Result Diagrams: 


 03/09/18 05:21





 03/09/18 05:21


Radiology Reviewed by me: Yes


EKG Reviewed by me: Yes





Phys Exam





- Physical Examination


HEENT: PERRLA, moist MMs


Neck: no nodes, no JVD


Respiratory: no rales, wheezing present


Cardiovascular: RRR, no significant murmur


Gastrointestinal: soft, non-tender


Musculoskeletal: no edema, pulses present


Neurological: non-focal, normal sensation


Lymphatic: no nodes


Psychiatric: normal affect, A&O x 3





Dx/Plan


(1) Melena


Code(s): K92.1 - MELENA   Status: Acute   Comment: Patient dropped Hb from 13 

to 11 and now 7.3 Pt iss tarted on PRBC transfusion 1 unit, GI found bleeding 

ulcer in gastric fundus on EGD, will continue patiebnt on protonix drip. Follow 

GI recommedations   





(2) Acute exacerbation of CHF (congestive heart failure)


Code(s): I50.9 - HEART FAILURE, UNSPECIFIED   Status: Acute   


Qualifiers: 


   Heart failure type: diastolic   Qualified Code(s): I50.33 - Acute on chronic 

diastolic (congestive) heart failure   


Comment: ef of 50%, Cardilogy Following, COntinue with ACEI/ BB.   





(3) Acute respiratory failure with hypoxia


Code(s): J96.01 - ACUTE RESPIRATORY FAILURE WITH HYPOXIA   Status: Acute   

Comment: improving. Pulmonary Following, pt on intermittant Bipap, very labile. 

Continue Nebs    





(4) Atrial flutter


Code(s): I48.92 - UNSPECIFIED ATRIAL FLUTTER   Status: Acute   


Qualifiers: 


   Atrial flutter type: typical   Qualified Code(s): I48.3 - Typical atrial 

flutter   


Comment: in sinus rhythm, Follow Dr. Tang Recommedations,  Ablation was 

successful, Pt had pacemaker yesterday, Kaleb.   





(5) Morbid obesity


Code(s): E66.01 - MORBID (SEVERE) OBESITY DUE TO EXCESS CALORIES   Status: 

Acute   





(6) PNA (pneumonia)


Code(s): J18.9 - PNEUMONIA, UNSPECIFIED ORGANISM   Status: Acute   Comment: cap

, Continue with IV antibiotics per pulmonary.   





(7) Sepsis


Code(s): A41.9 - SEPSIS, UNSPECIFIED ORGANISM   Status: Acute   


Qualifiers: 


   Sepsis type: sepsis due to unspecified organism   Qualified Code(s): A41.9 - 

Sepsis, unspecified organism   


Comment: Improving.    





(8) HTN (hypertension)


Code(s): I10 - ESSENTIAL (PRIMARY) HYPERTENSION   Status: Chronic   


Qualifiers: 


   Hypertension type: essential hypertension   Qualified Code(s): I10 - 

Essential (primary) hypertension   





- Plan


cont current plan of care, continue antibiotics, PT/OT, , speech 

therapy, respiratory therapy, incentive spirometry, DVT proph w/lovenox





* .








- Discharge Day


Encounter end time: 16:35





Review of Systems





- Review of Systems


Constitutional: negative: fever, chills, sweats, weakness, malaise, other


Eyes: negative: Pain, Vision Change, Conjunctivae Inflammation, Eyelid 

Inflammation, Redness, Other


ENT: negative: Ear Pain, Ear Discharge, Nose Pain, Nose Discharge, Nose 

Congestion, Mouth Pain, Mouth Swelling, Throat Pain, Throat Swelling, Other


Respiratory: negative: Cough, Dry, Shortness of Breath, Hemoptysis, SOB with 

Excertion, Pleuritic Pain, Sputum, Wheezing


Cardiovascular: negative: chest pain, palpitations, orthopnea, paroxysmal 

nocturnal dyspnea, edema, light headedness, other


Gastrointestinal: negative: Nausea, Vomiting, Abdominal Pain, Diarrhea, 

Constipation, Melena, Hematochezia, Other


Genitourinary: negative: Dysuria, Frequency, Incontinence, Hematuria, Retention

, Other


Musculoskeletal: negative: Neck Pain, Shoulder Pain, Arm Pain, Back Pain, Hand 

Pain, Leg Pain, Foot Pain, Other





- Medications/Allergies


Allergies/Adverse Reactions: 


 Allergies











Allergy/AdvReac Type Severity Reaction Status Date / Time


 


No Known Drug Allergies Allergy   Verified 02/26/18 14:53











Medications: 


 Current Medications





Acetaminophen (Tylenol)  650 mg PO Q4H PRN


   PRN Reason: Headache/Fever or Pain


   Last Admin: 03/03/18 07:42 Dose:  650 mg


Acetaminophen (Tylenol)  650 mg MS Q4H PRN


   PRN Reason: Headache/Fever or Pain


   Last Admin: 03/08/18 23:58 Dose:  650 mg


Acetaminophen/Codeine Phosphate (Tylenol #3)  1 tab PO Q4H PRN


   PRN Reason: Mild Pain (1-3)


Acetaminophen/Codeine Phosphate (Tylenol #3)  2 tab PO Q4H PRN


   PRN Reason: Moderate Pain (4-6)


Albuterol/Ipratropium (Duoneb)  3 ml NEB [id number] [facility]


   Last Admin: 03/09/18 14:29 Dose:  3 ml


Aspirin (Ecotrin)  81 mg PO DAILY [facility]


   Last Admin: 03/09/18 14:39 Dose:  Not Given


Docusate Sodium (Colace)  100 mg PO BID [facility]


   Last Admin: 03/09/18 14:39 Dose:  Not Given


Dutasteride (Avodart)  0.5 mg PO DAILY [facility]


   Last Admin: 03/09/18 14:40 Dose:  Not Given


Furosemide (Lasix)  40 mg SLOW IVP 0600 [facility]


   Last Admin: 03/09/18 05:00 Dose:  40 mg


Guaifenesin (Mucinex)  1,200 mg PO Q12HR [facility]


   Last Admin: 03/09/18 14:40 Dose:  Not Given


Sodium Chloride (1/2 Normal Saline)  1,000 mls @ 0 mls/hr IV .Q0M [facility]


   PRN Reason: KVO


   Last Admin: 03/09/18 03:20 Dose:  1,000 mls


Mineral Oil/White Petrolatum (Lacri-Lube Ointment)  0 gm EA EYE PRN PRN


   PRN Reason: Dry Eyes


Pantoprazole Sodium (Protonix)  40 mg IVP Q12HR [facility]


   Last Admin: 03/09/18 09:32 Dose:  40 mg


Sodium Chloride (Flush - Normal Saline)  10 ml IVF Q12HR [facility]


   Last Admin: 03/09/18 09:30 Dose:  10 ml


Sodium Chloride (Flush - Normal Saline)  10 ml IVF PRN PRN


   PRN Reason: Saline Flush


   Last Admin: 03/08/18 20:20 Dose:  10 ml


Tamsulosin HCl (Flomax)  0.4 mg PO DAILY [facility]


   Last Admin: 03/09/18 14:40 Dose:  Not Given

## 2018-03-09 NOTE — PRG
DATE OF SERVICE:  03/09/2018

 

SERVICE:  Pulmonary Medicine.

 

INTERVAL HISTORY:  The patient is doing fine from a cardiovascular and respiratory standpoint.  He is
 breathing comfortably.  No chest pain or shortness of breath.  Otherwise, he is doing quite well.  ESTEBAN medel is on a spontaneous breathing trial this morning.  If he meets criteria, extubation will be conside
red in 30 minutes.

 

PHYSICAL EXAMINATION:

VITAL SIGNS:  Afebrile, pulse 79, blood pressure 131/59, respirations 17, saturation 90% on 41% FiO2 
and a PEEP of 5.

GENERAL:  The patient is awake and alert, in no apparent distress.

LUNGS:  Decent air entry.  There is a slightly prolonged expiratory phase, but I do not appreciate an
y wheezing.  Crackles are present.  No rhonchi.

HEART:  Normal rate and regular.

ABDOMEN:  Soft, nontender and nondistended.  Bowel sounds are positive.

MUSCULOSKELETAL:  No cyanosis or clubbing.  No pitting in the bilateral lower extremities.

NEUROLOGIC:  Grossly nonfocal.

 

LABORATORY DATA:  WBC 7.3, hemoglobin 8.8 and platelets 173,000.  Potassium 3.4.  Basic metabolic pro
file is otherwise unremarkable.  Respiratory virus culture, blood cultures x2 and urine culture x2 ar
e unremarkable.

 

IMAGING DATA:  Chest x-ray demonstrates no complications after placement of a dual lead placed pacema
ker.  Endotracheal tube is 3 cm above the navi.  Layering bilateral pleural effusions are evident.

 

ASSESSMENT:

1.  Acute on chronic hypoxic and hypercapnic respiratory failure.

2.  Acute on chronic diastolic heart failure.

3.  Atrial flutter with rapid ventricular response, status post ablation.

4.  Bradyarrhythmia, status post dual lead pacemaker placement.

5.  Acute blood loss anemia secondary to peptic ulcer disease.

6.  Community-acquired pneumonia, resolved.

7.  Severe sepsis, resolved.

8.  Osteoarthritis of the bilateral hips, severe.

9.  Debility, severe.

 

DISCUSSION AND PLAN:  We will move forward with our spontaneous breathing trial.  Extubation will be 
considered if he meets criteria.  After this point, he will need to mobilize as aggressively as we po
ssibly can.  Long-term, the patient will need to lose a significant amount of weight, and engage with
 physical therapy to the best of his ability.  At some point in the future, if he is capable of doing
 so, he will need a bilateral hip replacement surgery.  At his current level of deconditioning; howev
er, he simply not a candidate based on what orthopedic surgery has suggested.

 

CRITICAL CARE TIME:  30 minutes.

## 2018-03-10 LAB
ANION GAP SERPL CALC-SCNC: 12 MMOL/L (ref 10–20)
BASOPHILS # BLD AUTO: 0 THOU/UL (ref 0–0.2)
BASOPHILS NFR BLD AUTO: 0.6 % (ref 0–1)
BUN SERPL-MCNC: 14 MG/DL (ref 8.4–25.7)
CALCIUM SERPL-MCNC: 7.8 MG/DL (ref 7.8–10.44)
CHLORIDE SERPL-SCNC: 99 MMOL/L (ref 98–107)
CO2 SERPL-SCNC: 31 MMOL/L (ref 23–31)
CREAT CL PREDICTED SERPL C-G-VRATE: 168 ML/MIN (ref 70–130)
EOSINOPHIL # BLD AUTO: 0.1 THOU/UL (ref 0–0.7)
EOSINOPHIL NFR BLD AUTO: 1.8 % (ref 0–10)
GLUCOSE SERPL-MCNC: 100 MG/DL (ref 80–115)
HGB BLD-MCNC: 9.7 G/DL (ref 14–18)
LYMPHOCYTES # BLD: 0.7 THOU/UL (ref 1.2–3.4)
LYMPHOCYTES NFR BLD AUTO: 11 % (ref 21–51)
MCH RBC QN AUTO: 31.3 PG (ref 27–31)
MCV RBC AUTO: 94.4 FL (ref 80–94)
MONOCYTES # BLD AUTO: 0.5 THOU/UL (ref 0.11–0.59)
MONOCYTES NFR BLD AUTO: 7.2 % (ref 0–10)
NEUTROPHILS # BLD AUTO: 5.1 THOU/UL (ref 1.4–6.5)
NEUTROPHILS NFR BLD AUTO: 79.4 % (ref 42–75)
PLATELET # BLD AUTO: 204 THOU/UL (ref 130–400)
POTASSIUM SERPL-SCNC: 3.5 MMOL/L (ref 3.5–5.1)
RBC # BLD AUTO: 3.11 MILL/UL (ref 4.7–6.1)
SODIUM SERPL-SCNC: 138 MMOL/L (ref 136–145)
WBC # BLD AUTO: 6.4 THOU/UL (ref 4.8–10.8)

## 2018-03-10 RX ADMIN — ASPIRIN SCH MG: 81 TABLET ORAL at 08:54

## 2018-03-10 RX ADMIN — Medication SCH ML: at 08:54

## 2018-03-10 RX ADMIN — Medication SCH ML: at 20:51

## 2018-03-10 NOTE — PRG
DATE OF SERVICE:  03/10/2018

 

SUBJECTIVE:  Mr. Herbert has no complaints.  His wife is in the room at the bedside.  Surprisingly, he a
sked me what was under his skin by his left shoulder.  I explained to him that it was his pacemaker.

 

He then inquired about all the wires on his chest, so he may be a little bit encephalopathic, althoug
h he is very quite cooperative.

 

OBJECTIVE:

VITAL SIGNS:  Heart rate 77, respiratory rate is 19, oximetry is 96 on 3 liters, blood pressure 121/6
0.

LUNGS:  Clear anteriorly.

CARDIOVASCULAR:  Regular rhythm.

ABDOMEN:  Soft.

EXTREMITIES:  Without asymmetry.

 

IMPRESSION:

1.  Acute on chronic hypoxic and hypercapnic respiratory failure.

2.  Extreme deconditioning with basically frozen hips and toe.

3.  Acute on chronic diastolic heart failure.

4.  Atrial flutter, status post cardioversion and pacemaker implantation.

5.  Blood loss anemia secondary to ulcer disease.

6.  Recent pneumonia.

 

PLAN:  Continue supportive care by keeping her one more day given his multiple complex medical proble
ms.  He appears to be stable at this time.

## 2018-03-10 NOTE — PDOC.PN
- Subjective


Encounter Start Date: 03/10/18


Encounter Start Time: 14:30


Patient is seen today, alert and oriented. No other Concern noted. Pt is S/p 

pacemaker.





- Objective


Resuscitation Status: 


 











Resuscitation Status           FULL:Full Resuscitation














MAR Reviewed: Yes


Vital Signs & Weight: 


 Vital Signs (12 hours)











  Temp Pulse Resp Pulse Ox


 


 03/10/18 13:22   77  19  96


 


 03/10/18 12:00  98.1 F   


 


 03/10/18 09:00  98.5 F   


 


 03/10/18 08:00  98.5 F  64  17  94 L


 


 03/10/18 07:23     94 L


 


 03/10/18 07:22   71  17  94 L


 


 03/10/18 04:00  98.9 F   








 Weight











Admit Weight                   260 lb


 


Weight                         247 lb 9.266 oz











 Most Recent Monitor Data











Heart Rate from ECG            76


 


NIBP                           121/60


 


NIBP BP-Mean                   77


 


Respiration from ECG           19


 


SpO2                           88














I&O: 


 











 03/09/18 03/10/18 03/11/18





 06:59 06:59 07:59


 


Intake Total 1679 810.8 477


 


Output Total 1665 1740 595


 


Balance 14 -929.2 -118











Result Diagrams: 


 03/09/18 05:21





 03/09/18 05:21


Radiology Reviewed by me: Yes





Phys Exam





- Physical Examination


HEENT: PERRLA, moist MMs


Neck: no nodes, no JVD


Respiratory: no wheezing, no rales


Cardiovascular: RRR, no significant murmur


Gastrointestinal: soft, non-tender


Musculoskeletal: no edema, pulses present


Neurological: non-focal, normal sensation


Lymphatic: no nodes


Psychiatric: normal affect


Skin: no rash, normal turgor





Dx/Plan


(1) Melena


Code(s): K92.1 - MELENA   Status: Acute   Comment: Patient dropped Hb from 13 

to 11 and now 8.8 will continue patiebnt on protonix IV. Follow GI 

recommedations. CBC ordered today.   





(2) Acute exacerbation of CHF (congestive heart failure)


Code(s): I50.9 - HEART FAILURE, UNSPECIFIED   Status: Acute   


Qualifiers: 


   Heart failure type: diastolic   Qualified Code(s): I50.33 - Acute on chronic 

diastolic (congestive) heart failure   


Comment: ef of 50%, Cardilogy Following, COntinue with ACEI/ BB.   





(3) Acute respiratory failure with hypoxia


Code(s): J96.01 - ACUTE RESPIRATORY FAILURE WITH HYPOXIA   Status: Acute   

Comment: improving. Pulmonary Following, pt on NC now, very labile. Continue 

Nebs    





(4) Atrial flutter


Code(s): I48.92 - UNSPECIFIED ATRIAL FLUTTER   Status: Acute   


Qualifiers: 


   Atrial flutter type: typical   Qualified Code(s): I48.3 - Typical atrial 

flutter   


Comment: in sinus rhythm, Follow Dr. Tang Recommedations,  Ablation was 

successful, Pt had pacemaker yesterday, Kaleb.   





(5) Morbid obesity


Code(s): E66.01 - MORBID (SEVERE) OBESITY DUE TO EXCESS CALORIES   Status: 

Acute   





(6) PNA (pneumonia)


Code(s): J18.9 - PNEUMONIA, UNSPECIFIED ORGANISM   Status: Acute   Comment: cap

, Continue with IV antibiotics per pulmonary.   





(7) Sepsis


Code(s): A41.9 - SEPSIS, UNSPECIFIED ORGANISM   Status: Acute   


Qualifiers: 


   Sepsis type: sepsis due to unspecified organism   Qualified Code(s): A41.9 - 

Sepsis, unspecified organism   


Comment: Improving.    





(8) HTN (hypertension)


Code(s): I10 - ESSENTIAL (PRIMARY) HYPERTENSION   Status: Chronic   


Qualifiers: 


   Hypertension type: essential hypertension   Qualified Code(s): I10 - 

Essential (primary) hypertension   





- Plan


cont current plan of care, granados catheter, continue antibiotics, PT/OT, social 

services, respiratory therapy, incentive spirometry, DVT proph w/lovenox





* .








- Discharge Day


Encounter end time: 15:05





Review of Systems





- Review of Systems


Constitutional: negative: fever, chills, sweats, weakness, malaise, other


Eyes: negative: Pain, Vision Change, Conjunctivae Inflammation, Eyelid 

Inflammation, Redness, Other


ENT: Throat Pain


Respiratory: negative: Cough, Dry, Shortness of Breath, Hemoptysis, SOB with 

Excertion, Pleuritic Pain, Sputum, Wheezing


Cardiovascular: negative: chest pain, palpitations, orthopnea, paroxysmal 

nocturnal dyspnea, edema, light headedness, other


Gastrointestinal: negative: Nausea, Vomiting, Abdominal Pain, Diarrhea, 

Constipation, Melena, Hematochezia, Other


Genitourinary: negative: Dysuria, Frequency, Incontinence, Hematuria, Retention

, Other


Musculoskeletal: negative: Neck Pain, Shoulder Pain, Arm Pain, Back Pain, Hand 

Pain, Leg Pain, Foot Pain, Other


Skin: negative: Rash, Lesions, Jason, Bruising, Other





- Medications/Allergies


Allergies/Adverse Reactions: 


 Allergies











Allergy/AdvReac Type Severity Reaction Status Date / Time


 


No Known Drug Allergies Allergy   Verified 02/26/18 14:53











Medications: 


 Current Medications





Acetaminophen (Tylenol)  650 mg PO Q4H PRN


   PRN Reason: Headache/Fever or Pain


   Last Admin: 03/03/18 07:42 Dose:  650 mg


Acetaminophen (Tylenol)  650 mg SD Q4H PRN


   PRN Reason: Headache/Fever or Pain


   Last Admin: 03/08/18 23:58 Dose:  650 mg


Acetaminophen/Codeine Phosphate (Tylenol #3)  1 tab PO Q4H PRN


   PRN Reason: Mild Pain (1-3)


Acetaminophen/Codeine Phosphate (Tylenol #3)  2 tab PO Q4H PRN


   PRN Reason: Moderate Pain (4-6)


Albuterol/Ipratropium (Duoneb)  3 ml NEB [id number] [facility]


   Last Admin: 03/10/18 13:22 Dose:  3 ml


Aspirin (Ecotrin)  81 mg PO DAILY [facility]


   Last Admin: 03/10/18 08:54 Dose:  81 mg


Docusate Sodium (Colace)  100 mg PO BID [facility]


   Last Admin: 03/10/18 08:54 Dose:  100 mg


Dutasteride (Avodart)  0.5 mg PO DAILY [facility]


   Last Admin: 03/10/18 08:53 Dose:  0.5 mg


Furosemide (Lasix)  40 mg SLOW IVP 0600 [facility]


   Last Admin: 03/10/18 05:07 Dose:  40 mg


Guaifenesin (Mucinex)  1,200 mg PO Q12HR [facility]


   Last Admin: 03/10/18 08:53 Dose:  1,200 mg


Sodium Chloride (1/2 Normal Saline)  1,000 mls @ 0 mls/hr IV .Q0M [facility]


   PRN Reason: KVO


   Last Admin: 03/10/18 05:07 Dose:  1,000 mls


Mineral Oil/White Petrolatum (Lacri-Lube Ointment)  0 gm EA EYE PRN PRN


   PRN Reason: Dry Eyes


Pantoprazole Sodium (Protonix)  40 mg IVP Q12HR [facility]


   Last Admin: 03/10/18 08:54 Dose:  40 mg


Sodium Chloride (Flush - Normal Saline)  10 ml IVF Q12HR [facility]


   Last Admin: 03/10/18 08:54 Dose:  10 ml


Sodium Chloride (Flush - Normal Saline)  10 ml IVF PRN PRN


   PRN Reason: Saline Flush


   Last Admin: 03/09/18 22:20 Dose:  10 ml


Tamsulosin HCl (Flomax)  0.4 mg PO DAILY [facility]


   Last Admin: 03/10/18 08:53 Dose:  0.4 mg

## 2018-03-11 RX ADMIN — Medication SCH ML: at 08:02

## 2018-03-11 RX ADMIN — Medication SCH ML: at 20:19

## 2018-03-11 RX ADMIN — ASPIRIN SCH MG: 81 TABLET ORAL at 08:01

## 2018-03-11 NOTE — PDOC.PN
- Subjective


Encounter Start Date: 03/11/18


Encounter Start Time: 13:00


Patient is resting in Bed, No other concenr snoed. he Look very tired, still 

hoase voice. Likely woked with Nuro chair today.





- Objective


Resuscitation Status: 


 











Resuscitation Status           FULL:Full Resuscitation














MAR Reviewed: Yes


Vital Signs & Weight: 


 Vital Signs (12 hours)











  Temp Pulse Resp Pulse Ox


 


 03/11/18 12:00  98.0 F   


 


 03/11/18 08:00  98.6 F  66  16  95


 


 03/11/18 07:29     93 L


 


 03/11/18 07:27   91  19  93 L


 


 03/11/18 04:00  98.5 F   








 Weight











Admit Weight                   260 lb


 


Weight                         244 lb 7.882 oz











 Most Recent Monitor Data











Heart Rate from ECG            70


 


NIBP                           116/57


 


NIBP BP-Mean                   62


 


Respiration from ECG           14


 


SpO2                           96














I&O: 


 











 03/10/18 03/11/18 03/12/18





 05:59 06:59 06:59


 


Intake Total   


 


Output Total   1510


 


Balance   -1510











Result Diagrams: 


 03/10/18 16:07





 03/10/18 16:07





Phys Exam





- Physical Examination


HEENT: PERRLA, moist MMs


Neck: no nodes, no JVD


Respiratory: no rales, wheezing present


Cardiovascular: RRR, no significant murmur


Gastrointestinal: soft, non-tender


Musculoskeletal: pulses present, edema present


Neurological: non-focal, normal sensation


Lymphatic: no nodes


Psychiatric: normal affect, A&O x 3


Skin: no rash, normal turgor





Dx/Plan


(1) Melena


Code(s): K92.1 - MELENA   Status: Acute   Comment: Patient dropped Hb from 13 

to 11 and now 8.8 will continue patiebnt on protonix IV. Follow GI 

recommedations. CBC ordered today.   





(2) Acute exacerbation of CHF (congestive heart failure)


Code(s): I50.9 - HEART FAILURE, UNSPECIFIED   Status: Acute   


Qualifiers: 


   Heart failure type: diastolic   Qualified Code(s): I50.33 - Acute on chronic 

diastolic (congestive) heart failure   


Comment: ef of 50%, [facility] Following, COntinue with ACEI/ BB.stbale.   





(3) Acute respiratory failure with hypoxia


Code(s): J96.01 - ACUTE RESPIRATORY FAILURE WITH HYPOXIA   Status: Acute   

Comment: improving. Pulmonary Following, pt on NC now, very labile. Continue 

Nebs, plan to keep him in ICU today.   





(4) Atrial flutter


Code(s): I48.92 - UNSPECIFIED ATRIAL FLUTTER   Status: Acute   


Qualifiers: 


   Atrial flutter type: typical   Qualified Code(s): I48.3 - Typical atrial 

flutter   


Comment: in sinus rhythm, Follow Dr. Tang Recommedations,  Ablation was 

successful, Pt had pacemaker yesterday, Kaleb.   





(5) Morbid obesity


Code(s): E66.01 - MORBID (SEVERE) OBESITY DUE TO EXCESS CALORIES   Status: 

Acute   





(6) PNA (pneumonia)


Code(s): J18.9 - PNEUMONIA, UNSPECIFIED ORGANISM   Status: Acute   Comment: cap

, Continue with IV antibiotics per pulmonary.   





(7) Sepsis


Code(s): A41.9 - SEPSIS, UNSPECIFIED ORGANISM   Status: Acute   


Qualifiers: 


   Sepsis type: sepsis due to unspecified organism   Qualified Code(s): A41.9 - 

Sepsis, unspecified organism   


Comment: Improving.    





(8) HTN (hypertension)


Code(s): I10 - ESSENTIAL (PRIMARY) HYPERTENSION   Status: Chronic   


Qualifiers: 


   Hypertension type: essential hypertension   Qualified Code(s): I10 - 

Essential (primary) hypertension   





- Plan


cont current plan of care, PT/OT, , speech therapy, incentive 

spirometry, DVT proph w/lovenox





* .








Review of Systems





- Review of Systems


Eyes: negative: Pain, Vision Change, Conjunctivae Inflammation, Eyelid 

Inflammation, Redness, Other


Respiratory: Cough, Shortness of Breath.  negative: Dry, Hemoptysis, SOB with 

Excertion, Pleuritic Pain, Sputum, Wheezing


Cardiovascular: edema.  negative: chest pain, palpitations, orthopnea, 

paroxysmal nocturnal dyspnea, light headedness, other


Gastrointestinal: negative: Nausea, Vomiting, Abdominal Pain, Diarrhea, 

Constipation, Melena, Hematochezia, Other


Musculoskeletal: negative: Neck Pain, Shoulder Pain, Arm Pain, Back Pain, Hand 

Pain, Leg Pain, Foot Pain, Other


Skin: negative: Rash, Lesions, Jason, Bruising, Other


Neurological: negative: Weakness, Numbness, Incoordination, Change in Speech, 

Confusion, Seizures, Other





- Medications/Allergies


Allergies/Adverse Reactions: 


 Allergies











Allergy/AdvReac Type Severity Reaction Status Date / Time


 


No Known Drug Allergies Allergy   Verified 02/26/18 14:53











Medications: 


 Current Medications





Acetaminophen (Tylenol)  650 mg PO Q4H PRN


   PRN Reason: Headache/Fever or Pain


   Last Admin: 03/03/18 07:42 Dose:  650 mg


Acetaminophen (Tylenol)  650 mg AL Q4H PRN


   PRN Reason: Headache/Fever or Pain


   Last Admin: 03/08/18 23:58 Dose:  650 mg


Acetaminophen/Codeine Phosphate (Tylenol #3)  1 tab PO Q4H PRN


   PRN Reason: Mild Pain (1-3)


Acetaminophen/Codeine Phosphate (Tylenol #3)  2 tab PO Q4H PRN


   PRN Reason: Moderate Pain (4-6)


Albuterol/Ipratropium (Duoneb)  3 ml NEB [id number] [facility]


   Last Admin: 03/11/18 07:27 Dose:  3 ml


Aspirin (Ecotrin)  81 mg PO DAILY [facility]


   Last Admin: 03/11/18 08:01 Dose:  81 mg


Docusate Sodium (Colace)  100 mg PO BID [facility]


   Last Admin: 03/11/18 08:01 Dose:  100 mg


Dutasteride (Avodart)  0.5 mg PO DAILY [facility]


   Last Admin: 03/11/18 08:01 Dose:  0.5 mg


Furosemide (Lasix)  40 mg SLOW IVP 0600 [facility]


   Last Admin: 03/11/18 06:29 Dose:  40 mg


Guaifenesin (Mucinex)  1,200 mg PO Q12HR [facility]


   Last Admin: 03/11/18 08:01 Dose:  1,200 mg


Sodium Chloride (1/2 Normal Saline)  1,000 mls @ 0 mls/hr IV .Q0M [facility]OME[facility]


   PRN Reason: KVO


   Last Admin: 03/10/18 05:07 Dose:  1,000 mls


Mineral Oil/White Petrolatum (Lacri-Lube Ointment)  0 gm EA EYE PRN PRN


   PRN Reason: Dry Eyes


Pantoprazole Sodium (Protonix)  40 mg IVP Q12HR [facility]


   Last Admin: 03/11/18 08:01 Dose:  40 mg


Sodium Chloride (Flush - Normal Saline)  10 ml IVF Q12HR [facility]


   Last Admin: 03/11/18 08:02 Dose:  10 ml


Sodium Chloride (Flush - Normal Saline)  10 ml IVF PRN PRN


   PRN Reason: Saline Flush


   Last Admin: 03/09/18 22:20 Dose:  10 ml


Tamsulosin HCl (Flomax)  0.4 mg PO DAILY [facility]


   Last Admin: 03/11/18 08:01 Dose:  0.4 mg

## 2018-03-11 NOTE — PRG
DATE OF SERVICE:  03/11/2018

 

SUBJECTIVE:  Mr. Herbert says he feels a little bit better.  He has no complaints.  He is very pleasant 
as he always has been this weekend and smiling.

 

OBJECTIVE:

VITAL SIGNS:  He is afebrile, heart rate 67, respiratory rate 14, oximetry is 92 on 3 liters.

LUNGS:  Remarkable for mild rhonchi on the right.

CARDIOVASCULAR:  Regular rhythm, no S3.

ABDOMEN:  Soft and nontender.

EXTREMITIES:  Without asymmetry or edema.  His feet are warm.

 

LABORATORY DATA:  No CBC today.  His hemoglobin yesterday 9.7.  There are no electrolytes today.

 

IMPRESSION:

1.  Deconditioning.

2.  Atrial flutter, status post ablation.

3.  Sick sinus syndrome, status post pacemaker.  His pacemaker insertion site looks good.

4.  Acute on chronic hypoxic and hypercapnic respiratory failure.

5.  Diastolic heart failure that is chronic.

6.  Blood loss anemia secondary to ulcer disease.

7.  Recent pneumonia.

 

PLAN:  Continue supportive care.  No medication changes today.  He is a labor intensive patient and I
 am not sure whether the best option next is for him maybe telemetry or intermediate care versus step
 down unit.

## 2018-03-11 NOTE — PRG
DATE OF SERVICE:  03/11/2018

 

SUBJECTIVE:  Mr. Herbert is doing well.  He seems to be more alert and oriented today.  He has no curren
t complaints.

 

PHYSICAL EXAMINATION:

VITAL SIGNS:  134/71, pulse 60, respirations 20.

LUNGS:  Mild rhonchi bilaterally, but appears improved.

CARDIAC:  Regular rate and rhythm.

ABDOMEN:  Soft, obese.  Positive bowel sounds.

EXTREMITIES:  1+ pitting edema.

 

PERTINENT LABORATORY DATA:  Hemoglobin 9.7, creatinine 0.65.  Peak troponin 0.095.

 

IMPRESSION:

1.  Atrial flutter - status post ablation, doing well.

2.  Sick sinus syndrome - patient is status post pacemaker placement.

3.  Severe osteoarthritis - at this point is unsure whether patient will be able to undergo surgical 
therapy.  Patient will need intensive rehabilitation.

 

From my standpoint, Mr. Herbert would like to follow up with Dr. Fried who his primary cardiologist.  A
t this point, I have no further recommendations.  Please reconsult if needed.

## 2018-03-12 LAB
ANION GAP SERPL CALC-SCNC: 5 MMOL/L (ref 10–20)
BASOPHILS # BLD AUTO: 0.1 THOU/UL (ref 0–0.2)
BASOPHILS NFR BLD AUTO: 0.9 % (ref 0–1)
BUN SERPL-MCNC: 13 MG/DL (ref 8.4–25.7)
CALCIUM SERPL-MCNC: 7.8 MG/DL (ref 7.8–10.44)
CHLORIDE SERPL-SCNC: 97 MMOL/L (ref 98–107)
CO2 SERPL-SCNC: 37 MMOL/L (ref 23–31)
CREAT CL PREDICTED SERPL C-G-VRATE: 186 ML/MIN (ref 70–130)
EOSINOPHIL # BLD AUTO: 0.1 THOU/UL (ref 0–0.7)
EOSINOPHIL NFR BLD AUTO: 2.2 % (ref 0–10)
GLUCOSE SERPL-MCNC: 123 MG/DL (ref 80–115)
HGB BLD-MCNC: 10.2 G/DL (ref 14–18)
LYMPHOCYTES # BLD: 0.6 THOU/UL (ref 1.2–3.4)
LYMPHOCYTES NFR BLD AUTO: 9.7 % (ref 21–51)
MCH RBC QN AUTO: 30.8 PG (ref 27–31)
MCV RBC AUTO: 94.7 FL (ref 80–94)
MONOCYTES # BLD AUTO: 0.4 THOU/UL (ref 0.11–0.59)
MONOCYTES NFR BLD AUTO: 7 % (ref 0–10)
NEUTROPHILS # BLD AUTO: 4.7 THOU/UL (ref 1.4–6.5)
NEUTROPHILS NFR BLD AUTO: 80.2 % (ref 42–75)
PLATELET # BLD AUTO: 220 THOU/UL (ref 130–400)
POTASSIUM SERPL-SCNC: 3.2 MMOL/L (ref 3.5–5.1)
RBC # BLD AUTO: 3.32 MILL/UL (ref 4.7–6.1)
SODIUM SERPL-SCNC: 136 MMOL/L (ref 136–145)
WBC # BLD AUTO: 5.9 THOU/UL (ref 4.8–10.8)

## 2018-03-12 RX ADMIN — ASPIRIN SCH MG: 81 TABLET ORAL at 07:59

## 2018-03-12 RX ADMIN — Medication SCH ML: at 07:59

## 2018-03-12 RX ADMIN — Medication SCH ML: at 21:52

## 2018-03-12 NOTE — PDOC.CTH
Cardiology Progress Note





- Subjective





EP progress note:





No new cardiac complaints.  Denies pain or tenderness at PPM insertion site.  

Denies bruising or bleeding from groin sites post ablation.  Continues to have 

shortness of breath, dyspnea, wheezing, pain, and severe mobility limitations. 





- Objective


 Vital Signs











  Temp Pulse Resp Pulse Ox


 


 03/12/18 08:00  97.7 F  79  17  95


 


 03/12/18 07:34     96


 


 03/12/18 07:32   73  17  96


 


 03/12/18 04:00  98.5 F   


 


 03/12/18 00:00  98.4 F   








 











Admit Weight                   260 lb


 


Weight                         248 lb 3.848 oz














 











 03/11/18 03/12/18 03/13/18





 06:59 06:59 06:59


 


Intake Total  785 250


 


Output Total  2230 1493


 


Balance  -1445 -1243














- Physical Examination


General/Neuro: alert & oriented x3, NAD


Lungs: other: (expiratory wheezes BL (L>R). Severely diminished in BL bases)


Extremities: + edema B


Other PE findings: PPM site stable,minimal bruising.no drainage or 

redness.edges approximated





- Telemetry


Telemetry Rhythm: NSR/paced





- Labs


Result Diagrams: 


 03/12/18 04:38





 03/12/18 04:38


 Troponin/CKMB











CK-MB (CK-2)  1.5 ng/mL (0-6.6)   03/07/18  10:52    


 


Troponin I  0.095 ng/mL (< 0.028)  H  03/07/18  10:52    














- Assessment/Plan





1. Typical atrial flutter s/p CTI ablation.  No recurrence. Groin site healed.


2. Sinus node dysfunction s/p dual chamber medtronic PPM implant on 3/8/18, 

AAIR <=> DDDR lower rate set at 60. Site stable without hematoma. Minimal 

bruising. 





Will need routine follow up as outpatient as arranged by [facility].

## 2018-03-12 NOTE — PDOC.PN
- Subjective


Encounter Start Date: 03/12/18


Encounter Start Time: 14:30


Subjective: Patient reports stable breathing status. Just finished a neb. 

Continued 


-: severe pain in bilateral hips. Asking if we can reinstitute his home 


-: hydrocodone for pain.





- Objective


Resuscitation Status: 


 











Resuscitation Status           FULL:Full Resuscitation














MAR Reviewed: Yes


Vital Signs & Weight: 


 Vital Signs (12 hours)











  Temp Pulse Resp Pulse Ox


 


 03/12/18 08:00  97.7 F  79  17  95


 


 03/12/18 07:34     96


 


 03/12/18 07:32   73  17  96


 


 03/12/18 04:00  98.5 F   








 Weight











Admit Weight                   260 lb


 


Weight                         248 lb 3.848 oz











 Most Recent Monitor Data











Heart Rate from ECG            77


 


NIBP                           94/50


 


NIBP BP-Mean                   55


 


Respiration from ECG           18


 


SpO2                           85














I&O: 


 











 03/11/18 03/12/18 03/13/18





 06:59 06:59 06:59


 


Intake Total  785 250


 


Output Total  2230 1493


 


Balance  -1445 -1243











Result Diagrams: 


 03/12/18 04:38





 03/12/18 04:38





Phys Exam





- Physical Examination


Morbidly obese


HEENT: moist MMs


Neck: no JVD


Respiratory: no rales, no rhonchi, wheezing present


mild increase WOB, talking comfortably


left chest wound from pacemaker healing well


Cardiovascular: RRR


Gastrointestinal: soft, non-tender, positive bowel sounds


Neurological: non-focal, moves all 4 limbs


Psychiatric: normal affect





Dx/Plan


(1) Sick sinus syndrome


Code(s): I49.5 - SICK SINUS SYNDROME   Status: Acute   Comment: s/p pacemaker   





(2) Atrial flutter


Code(s): I48.92 - UNSPECIFIED ATRIAL FLUTTER   Status: Acute   


Qualifiers: 


   Atrial flutter type: typical   Qualified Code(s): I48.3 - Typical atrial 

flutter   


Comment: s/p ablation and pacemaker   





(3) Acute and chronic respiratory failure


Code(s): J96.20 - ACUTE AND CHR RESP FAILURE, UNSP W HYPOXIA OR HYPERCAPNIA   

Status: Acute   


Qualifiers: 


   Respiratory failure complication: hypoxia and hypercapnia   Qualified Code(s)

: J96.21 - Acute and chronic respiratory failure with hypoxia; J96.22 - Acute 

and chronic respiratory failure with hypercapnia; J96.22 - Acute and chronic 

respiratory failure with hypercapnia; J96.22 - Acute and chronic respiratory 

failure with hypercapnia   





(4) Diastolic CHF


Code(s): I50.30 - UNSPECIFIED DIASTOLIC (CONGESTIVE) HEART FAILURE   Status: 

Chronic   


Qualifiers: 


   Heart failure chronicity: chronic   Qualified Code(s): I50.32 - Chronic 

diastolic (congestive) heart failure   





(5) Peptic ulcer disease with hemorrhage


Code(s): K27.4 - CHRONIC OR UNSP PEPTIC ULCER, SITE UNSP, WITH HEMORRHAGE   

Status: Acute   Comment: s/p cautery of bleeding vessel, GI signed off, H/H 

stable since transfusion   





(6) PNA (pneumonia)


Code(s): J18.9 - PNEUMONIA, UNSPECIFIED ORGANISM   Status: Resolved   Comment: 

cap, Continue with IV antibiotics per pulmonary.   





(7) Sepsis


Code(s): A41.9 - SEPSIS, UNSPECIFIED ORGANISM   Status: Resolved   


Qualifiers: 


   Sepsis type: sepsis due to unspecified organism   Qualified Code(s): A41.9 - 

Sepsis, unspecified organism   


Comment: Improving.    





(8) Morbid obesity


Code(s): E66.01 - MORBID (SEVERE) OBESITY DUE TO EXCESS CALORIES   Status: 

Chronic   





(9) HTN (hypertension)


Code(s): I10 - ESSENTIAL (PRIMARY) HYPERTENSION   Status: Chronic   


Qualifiers: 


   Hypertension type: essential hypertension   Qualified Code(s): I10 - 

Essential (primary) hypertension   





(10) WILMA (obstructive sleep apnea)


Code(s): G47.33 - OBSTRUCTIVE SLEEP APNEA (ADULT) (PEDIATRIC)   Status: Chronic

   





(11) Bilateral hip joint arthritis


Code(s): M16.0 - BILATERAL PRIMARY OSTEOARTHRITIS OF HIP   Status: Acute   

Comment: Resume pain medication now that breathing has stablized.   





- Plan


cont current plan of care, PT/OT





* .








- Discharge Day


Encounter end time: 14:50

## 2018-03-12 NOTE — PRG
DATE OF SERVICE:  03/12/2018

 

SERVICE:  Pulmonary Medicine.

 

INTERVAL HISTORY:  The patient is doing fine from cardiovascular and respiratory standpoint.  He pj
es any chest pain, fevers, chills, nausea or vomiting.  Otherwise, he is moving in right direction.  
He is breathing comfortably.  He is not having any cough and his appetite is excellent.

 

PHYSICAL EXAMINATION:

VITAL SIGNS:  Afebrile, pulse 84, blood pressure 93/52, respirations 18, saturation 93% on 4 liters n
yue cannula.

GENERAL:  The patient is awake and alert, in no apparent distress.

LUNGS:  Decent air entry.  There is a slightly prolonged expiratory phase, but no wheezing or rhonchi
 are appreciated.  The crackles are gone.  Heart rate is regular.

ABDOMEN:  Soft, nontender, nondistended.  Bowel sounds are positive.

MUSCULOSKELETAL:  No cyanosis or clubbing.  No pitting in the bilateral lower extremities.

NEUROLOGIC:  Grossly nonfocal.

 

LABORATORY DATA:  WBC 5.9, hemoglobin 10.2, platelets 220,000.  Potassium 3.2, bicarbonate 37, creati
nine 0.58.

 

ASSESSMENT:

1.  Acute on chronic hypoxic and hypercapnic respiratory failure.

2.  Chronic diastolic heart failure, returning to euvolemia.

3.  Atrial flutter with rapid ventricular response, status post ablation.

4.  Bradyarrhythmia, status post dual lead pacemaker placement.

5.  Acute blood loss anemia secondary to peptic ulcer disease, stable.

6.  Community-acquired pneumonia, resolved.

7.  Severe sepsis, resolved.

8.  Osteoarthritis of the bilateral hips, severe.

9.  Debility, severe.

 

DISCUSSION AND PLAN:  The patient can be transitioned out of the ICU to the intermediate care unit.  
We will work on mobilizing him.  His acute medical issues have come to close.  As such, he can be tra
nsitioned out of the hospital provided we have a safe location for him to go where he can pursue aggr
essive physical therapy and weight loss with a goal of having bilateral hip replacements done at some
 point in the future.  I will continue to follow while he remains inhouse.

## 2018-03-13 VITALS — SYSTOLIC BLOOD PRESSURE: 153 MMHG | DIASTOLIC BLOOD PRESSURE: 73 MMHG | TEMPERATURE: 99.6 F

## 2018-03-13 LAB
ANION GAP SERPL CALC-SCNC: 10 MMOL/L (ref 10–20)
BUN SERPL-MCNC: 10 MG/DL (ref 8.4–25.7)
CALCIUM SERPL-MCNC: 8.1 MG/DL (ref 7.8–10.44)
CHLORIDE SERPL-SCNC: 96 MMOL/L (ref 98–107)
CO2 SERPL-SCNC: 36 MMOL/L (ref 23–31)
CREAT CL PREDICTED SERPL C-G-VRATE: 195 ML/MIN (ref 70–130)
GLUCOSE SERPL-MCNC: 104 MG/DL (ref 80–115)
MAGNESIUM SERPL-MCNC: 2.1 MG/DL (ref 1.6–2.6)
POTASSIUM SERPL-SCNC: 3.9 MMOL/L (ref 3.5–5.1)
SODIUM SERPL-SCNC: 138 MMOL/L (ref 136–145)

## 2018-03-13 RX ADMIN — Medication SCH ML: at 09:20

## 2018-03-13 RX ADMIN — ASPIRIN SCH MG: 81 TABLET ORAL at 09:19

## 2018-03-13 NOTE — PRG
DATE OF SERVICE:  03/13/2018

 

SERVICE:  Pulmonary Medicine.

 

INTERVAL HISTORY:  The patient is doing outstanding from a respiratory standpoint.  He denies any mony
st pain, shortness of breath, fevers or chills.  Otherwise, there has been no interval change to his 
condition.

 

PHYSICAL EXAMINATION:

VITAL SIGNS:  Afebrile, pulse 68, blood pressure 114/67, respirations 20, saturation 97% on 4 liters 
nasal cannula.

GENERAL:  The patient is awake, alert, no apparent distress.

LUNGS:  Excellent air entry.  There is no prolonged expiratory phase.  There is no wheezing, rhonchi,
 or crackles appreciated.

HEART:  Normal rate, regular.

ABDOMEN:  Soft, nontender, nondistended.  Bowel sounds are positive.

MUSCULOSKELETAL:  No cyanosis or clubbing.  There is trace pitting in the bilateral lower extremities
.

NEUROLOGIC:  Grossly nonfocal.

 

ASSESSMENT:

1.  Acute on chronic hypoxic and hypercapnic respiratory failure, resolved to baseline.

2.  Chronic diastolic heart failure, close to euvolemia.

3.  Atrial flutter with rapid ventricular response, status post ablation.

4.  Bradyarrhythmia, status post dual lead pacemaker placement.

5.  Acute blood loss anemia secondary to peptic ulcer disease, stable.

6.  Community-acquired pneumonia, resolved.

7.  Severe sepsis, resolved.

8.  Osteoarthritis of the bilateral hips, severe.

9.  Debility, severe.

 

DISCUSSION AND PLAN:  The patient is stable from a respiratory standpoint for transition out of [facility].  We will discontinue his KVO IV fluids.  All medications that he is on have been switched over t
o p.o. medications.  He will need to prove to pursue aggressive physical therapy and occupational the
rapy moving forward in order to promote healthy weight reduction of 1-2 pounds per week.  He will als
o need to increase functionality so that he will be a candidate for hip replacement surgeries in the 
distant future.  If he cannot do these things and developed increasing weakness, he understands that 
he is most likely to be approaching the end of his life.  I will continue to follow if he remains in 
house, but hopefully he will be heading out soon.

## 2018-03-13 NOTE — DIS
DATE OF ADMISSION:  02/26/2018

 

DATE OF DISCHARGE:  03/13/2018

 

DISCHARGE DIAGNOSES:

1.  Community-acquired pneumonia.

2.  Peptic ulcer disease with a bleeding visible vessel.

3.  Acute blood loss anemia.

4.  Acute on chronic diastolic congestive heart failure.

5.  Sick sinus syndrome.

6.  Atrial flutter, status post ablation and pacemaker.

7.  Acute on chronic respiratory failure with hypoxemia.

8.  Sepsis, resolved.

9.  Severe obesity

10.  Hypertension.

11.  Obstructive sleep apnea.

12.  Bilateral hip joint osteoarthritis.

 

CONSULTATIONS:

1.  Cardiology, Dr. Дмитрий Arce.

2.  Pulmonary Critical Care, Dr. Anastacio Guerra.

3.  Orthopedics, Jayme Osorio PA-C and Dr. Christ Roldan.

4.  Urology, Dr. Blevins.

5.  Electrophysiology, Dr. Timo Tang.

6.  Gastroenterology, Dr. Froilan Swain.

 

PROCEDURES:

1.  03/07/2018 - Electrophysiology study and radiofrequency ablation by Dr. Timo Tang.

2.  EGD with electrocautery of a bleeding vessel by Dr. Froy Danielle on 03/06/2018.

3.  Endotracheal intubation by Dr. Anastacio Guerra on 03/06/2018.

 

HISTORY AND PHYSICAL:  Mr. Power is a 72-year-old white male with the above history, who presented in 
the Emergency Department on 03/26/2018 with acute respiratory failure and possible pneumonia.  He has
 been having progressive weakness and congestion several days prior to presentation.  His wife said mary medel had a flu-like illness.  He fell the day of admission and could not get up, they called 911 and EMT
 got him into the couch.  He refused transport initially.  Took a Norco and then fell off the couch a
gain so EMT was recalled.  They suggested to bring him to the Emergency Department.  On arrival, the 
patient was having chest pain or palpitations, but was not able to lay flat.  He was on BiPAP on arri
yogesh and was able to be weaned only to 100% nonrebreather.  He was subsequently found to have a normal
 white count, fairly normal ABG, but certainly requiring oxygen for maintaining oxygen saturations.  
We were called for admission.

 

HOSPITAL COURSE:  The patient was seen and examined by Dr. Fry and admitted to the [facility]
te Care Unit for acute respiratory failure and hypoxia.  He was placed empirically on Levaquin and st
arted on Tamiflu while PCL was awaited.  He was given IV Solu-Medrol and DuoNebs, and Doppler for his
 lower extremities to rule out DVT.

 

Overnight 02/26/2018 to 02/27/218, patient was initially doing okay.  He does move all extremities, b
ut was still short of breath, not able to bring up any sputum.  Later that evening went into atrial f
lutter with heart rate of 133.  He is given digoxin x2 and placed on a Cardizem drip and Cardiology w
as consulted.  CT angio was negative for pulmonary embolus.

 

On 02/28/2018, he was a little more lethargic, but still responding to verbal stimuli.  He was on Zos
yn for possible aspiration, white count was noted to have 42% bands.  He was on amiodarone and conver
anushka back to sinus rhythm, and was using BiPAP as needed.

 

By 3/1/2018, the patient was relatively stable.  He was keen on daily Lasix and prednisone, and remai
guadalupe in sinus rhythm and PT/OT was consulted.

 

By 3/2/2018, continued to remain stable.  He was on vancomycin and Zosyn, PT/OT consult was pending a
nd SNF placement consideration was begun.

 

On 03/03/2018, Dr. Nunez took over the case for the next 2 days.  The patient was feeling better and
 really not worsened.  He remained in sinus rhythm.  Remained on anticoagulation and by 3/4/2018, ult
rasound of the kidney showed a hyperechoic mass or with so Urology was consulted.  A CT scan of the [facility] shows cyst, but no mass.  He was continued to be monitored.  On 03/11/2018, the patient was fol
lowed by Dr. Barbour.  On 3/6/2018, he developed respiratory failure and had to be intubated.  Remain 
on the ventilator for a short period of time and did well after extubation on 3/9/2018.

 

By 3/12/2018, patient was up to a geriatric chair.  He was able to work with physical therapy some.  
There end-stage osteoarthritis of the hips was limiting his motion, however, due to his other comorbi
dities, he was not a candidate at this time for any kind of joint procedure.  He was kept on a hypoca
loric hloremic cor caloric diet and was subsequently monitored in the IMCU.

 

Today 3/13/2018, patient was approved for rehabilitation at [facility].  Medically was cleared by Pulm
onology to go for rehabilitation.  It was recommended that he stay on a hypocaloric diet, and lose we
ight.

 

The patient was seen and discussed.  He was discharged to [facility] in stable condition.

 

PHYSICAL EXAMINATION:  The patient was seen and examined on the day of discharge.

 

Discharge Plan disposition was discussed with the patient face to face at the bedside.

 

DISCHARGE MEDICATIONS:

1.  Tylenol as needed.

2.  Aspirin 81 mg daily.

3.  Avodart 0.5 mg daily with Lasix 40 mg daily.

4.  Guaifenesin ER 1200 mg p.o. b.i.d.

5.  DuoNeb 3 mL q.6 hours as needed.

6.  Protonix 40 mg b.i.d.

7.  Flomax 0.4 mg p.o. daily.

8.  Amlodipine 5 mg daily.

9.  Carvedilol 6.25 mg p.o. b.i.d.

10.  Plavix 75 mg daily.

11.  Lisinopril 20 mg p.o. b.i.d.

12.  Zocor 20 mg p.o. at bedtime.

 

FOLLOWUP APPOINTMENTS:

1.  Primary care physician Dr. David Gan in 1 week.

2.  Cardiology Dr. Keon Fried in 2-3 weeks.

 

DISCHARGE CONDITION:  Stable.

 

DISPOSITION:  Will be discharged to [facility] nursing rehabilitation for PT and OT.

 

DISCHARGE ACTIVITY:  Per orthopedic and cardiopulmonary limits.

 

DISCHARGE DIET:  Heart healthy.  A 1600 calories with 1500 mL fluid restriction.

## 2018-03-14 NOTE — EKG
Test Reason : 

Blood Pressure : ***/*** mmHG

Vent. Rate : 081 BPM     Atrial Rate : 081 BPM

   P-R Int : 166 ms          QRS Dur : 148 ms

    QT Int : 428 ms       P-R-T Axes : 249 -64 057 degrees

   QTc Int : 497 ms

 

Unusual P axis, possible ectopic atrial rhythm

Right bundle branch block

Left anterior fascicular block

*** Bifascicular block ***

Abnormal ECG

When compared with ECG of 08-MAR-2018 06:42, (Unconfirmed)

No significant change was found

Confirmed by DR. EUN ROCHA ([id number]) on 3/14/2018 6:55:19 AM

 

Referred By:  KASSIE           Confirmed By:DR. UEN ROCHA

## 2018-03-14 NOTE — EKG
Test Reason : 

Blood Pressure : ***/*** mmHG

Vent. Rate : 078 BPM     Atrial Rate : 078 BPM

   P-R Int : 172 ms          QRS Dur : 148 ms

    QT Int : 454 ms       P-R-T Axes : 252 -62 054 degrees

   QTc Int : 517 ms

 

Unusual P axis, possible ectopic atrial rhythm

Right bundle branch block

Left anterior fascicular block

*** Bifascicular block ***

Abnormal ECG

When compared with ECG of 08-MAR-2018 13:43, (Unconfirmed)

No significant change was found

Confirmed by DR. EUN ROCHA ([id number]) on 3/14/2018 6:57:16 AM

 

Referred By:  KASSIE           Confirmed By:DR. EUN ROCHA

## 2018-03-15 ENCOUNTER — HOSPITAL ENCOUNTER (OUTPATIENT)
Dept: HOSPITAL 92 - ERS | Age: 73
Setting detail: OBSERVATION
LOS: 1 days | Discharge: NURSING, REHAB OR LONG-TERM CARE | End: 2018-03-16
Attending: FAMILY MEDICINE | Admitting: FAMILY MEDICINE
Payer: MEDICARE

## 2018-03-15 VITALS — BODY MASS INDEX: 43 KG/M2

## 2018-03-15 DIAGNOSIS — J96.11: ICD-10-CM

## 2018-03-15 DIAGNOSIS — D64.9: ICD-10-CM

## 2018-03-15 DIAGNOSIS — M16.0: ICD-10-CM

## 2018-03-15 DIAGNOSIS — S81.812A: Primary | ICD-10-CM

## 2018-03-15 DIAGNOSIS — Z79.82: ICD-10-CM

## 2018-03-15 DIAGNOSIS — I11.0: ICD-10-CM

## 2018-03-15 DIAGNOSIS — E66.01: ICD-10-CM

## 2018-03-15 DIAGNOSIS — R53.81: ICD-10-CM

## 2018-03-15 DIAGNOSIS — G47.33: ICD-10-CM
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
## 2018-03-15 DIAGNOSIS — Z87.01: ICD-10-CM

## 2018-03-15 DIAGNOSIS — Z98.890: ICD-10-CM

## 2018-03-15 DIAGNOSIS — Z87.11: ICD-10-CM

## 2018-03-15 DIAGNOSIS — I49.5: ICD-10-CM

## 2018-03-15 DIAGNOSIS — Z87.891: ICD-10-CM

## 2018-03-15 DIAGNOSIS — I50.32: ICD-10-CM

## 2018-03-15 DIAGNOSIS — Z79.899: ICD-10-CM
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
## 2018-03-15 DIAGNOSIS — Z79.02: ICD-10-CM

## 2018-03-15 LAB
ALBUMIN SERPL BCG-MCNC: 2.9 G/DL (ref 3.4–4.8)
ALP SERPL-CCNC: 61 U/L (ref 40–150)
ALT SERPL W P-5'-P-CCNC: 14 U/L (ref 8–55)
ANION GAP SERPL CALC-SCNC: 11 MMOL/L (ref 10–20)
APTT PPP: 30.4 SEC (ref 22.9–36.1)
AST SERPL-CCNC: 17 U/L (ref 5–34)
BACTERIA UR QL AUTO: (no result) HPF
BASOPHILS # BLD AUTO: 0 THOU/UL (ref 0–0.2)
BASOPHILS NFR BLD AUTO: 0.4 % (ref 0–1)
BILIRUB SERPL-MCNC: 0.6 MG/DL (ref 0.2–1.2)
BUN SERPL-MCNC: 9 MG/DL (ref 8.4–25.7)
CALCIUM SERPL-MCNC: 8.2 MG/DL (ref 7.8–10.44)
CHLORIDE SERPL-SCNC: 93 MMOL/L (ref 98–107)
CK MB SERPL-MCNC: 1.1 NG/ML (ref 0–6.6)
CK SERPL-CCNC: 27 U/L (ref 30–200)
CO2 SERPL-SCNC: 34 MMOL/L (ref 23–31)
CREAT CL PREDICTED SERPL C-G-VRATE: 0 ML/MIN (ref 70–130)
CRYSTAL-AUWI FLAG: 0.4 (ref 0–15)
EOSINOPHIL # BLD AUTO: 0.2 THOU/UL (ref 0–0.7)
EOSINOPHIL NFR BLD AUTO: 2.6 % (ref 0–10)
GLOBULIN SER CALC-MCNC: 2.9 G/DL (ref 2.4–3.5)
GLUCOSE SERPL-MCNC: 131 MG/DL (ref 80–115)
HEV IGM SER QL: 2.7 (ref 0–7.99)
HGB BLD-MCNC: 10.5 G/DL (ref 14–18)
HYALINE CASTS #/AREA URNS LPF: (no result) LPF
INR PPP: 1.3
LIPASE SERPL-CCNC: 16 U/L (ref 8–78)
LYMPHOCYTES # BLD: 0.6 THOU/UL (ref 1.2–3.4)
LYMPHOCYTES NFR BLD AUTO: 9.1 % (ref 21–51)
MCH RBC QN AUTO: 30.7 PG (ref 27–31)
MCV RBC AUTO: 98.2 FL (ref 80–94)
MONOCYTES # BLD AUTO: 0.5 THOU/UL (ref 0.11–0.59)
MONOCYTES NFR BLD AUTO: 8.6 % (ref 0–10)
NEUTROPHILS # BLD AUTO: 4.9 THOU/UL (ref 1.4–6.5)
NEUTROPHILS NFR BLD AUTO: 79.4 % (ref 42–75)
PATHC CAST-AUWI FLAG: 0.13 (ref 0–2.49)
PLATELET # BLD AUTO: 244 THOU/UL (ref 130–400)
POTASSIUM SERPL-SCNC: 4.2 MMOL/L (ref 3.5–5.1)
PROTHROMBIN TIME: 16 SEC (ref 12–14.7)
RBC # BLD AUTO: 3.41 MILL/UL (ref 4.7–6.1)
RBC UR QL AUTO: (no result) HPF (ref 0–3)
SODIUM SERPL-SCNC: 134 MMOL/L (ref 136–145)
SP GR UR STRIP: 1.01 (ref 1–1.04)
SPERM-AUWI FLAG: 0 (ref 0–9.9)
TROPONIN I SERPL DL<=0.01 NG/ML-MCNC: 0.03 NG/ML (ref ?–0.03)
TROPONIN I SERPL DL<=0.01 NG/ML-MCNC: 0.04 NG/ML (ref ?–0.03)
WBC # BLD AUTO: 6.2 THOU/UL (ref 4.8–10.8)
WBC UR QL AUTO: (no result) HPF (ref 0–3)
YEAST-AUWI FLAG: 0 (ref 0–25)

## 2018-03-15 PROCEDURE — 36415 COLL VENOUS BLD VENIPUNCTURE: CPT

## 2018-03-15 PROCEDURE — 96372 THER/PROPH/DIAG INJ SC/IM: CPT

## 2018-03-15 PROCEDURE — 82550 ASSAY OF CK (CPK): CPT

## 2018-03-15 PROCEDURE — 73590 X-RAY EXAM OF LOWER LEG: CPT

## 2018-03-15 PROCEDURE — G0378 HOSPITAL OBSERVATION PER HR: HCPCS

## 2018-03-15 PROCEDURE — 84484 ASSAY OF TROPONIN QUANT: CPT

## 2018-03-15 PROCEDURE — 83880 ASSAY OF NATRIURETIC PEPTIDE: CPT

## 2018-03-15 PROCEDURE — 99285 EMERGENCY DEPT VISIT HI MDM: CPT

## 2018-03-15 PROCEDURE — 85025 COMPLETE CBC W/AUTO DIFF WBC: CPT

## 2018-03-15 PROCEDURE — 83690 ASSAY OF LIPASE: CPT

## 2018-03-15 PROCEDURE — 81003 URINALYSIS AUTO W/O SCOPE: CPT

## 2018-03-15 PROCEDURE — 87040 BLOOD CULTURE FOR BACTERIA: CPT

## 2018-03-15 PROCEDURE — 81015 MICROSCOPIC EXAM OF URINE: CPT

## 2018-03-15 PROCEDURE — 97139 UNLISTED THERAPEUTIC PX: CPT

## 2018-03-15 PROCEDURE — 80048 BASIC METABOLIC PNL TOTAL CA: CPT

## 2018-03-15 PROCEDURE — 97530 THERAPEUTIC ACTIVITIES: CPT

## 2018-03-15 PROCEDURE — 80053 COMPREHEN METABOLIC PANEL: CPT

## 2018-03-15 PROCEDURE — 85730 THROMBOPLASTIN TIME PARTIAL: CPT

## 2018-03-15 PROCEDURE — 90715 TDAP VACCINE 7 YRS/> IM: CPT

## 2018-03-15 PROCEDURE — 96365 THER/PROPH/DIAG IV INF INIT: CPT

## 2018-03-15 PROCEDURE — 87086 URINE CULTURE/COLONY COUNT: CPT

## 2018-03-15 PROCEDURE — 12034 INTMD RPR S/TR/EXT 7.6-12.5: CPT

## 2018-03-15 PROCEDURE — 94640 AIRWAY INHALATION TREATMENT: CPT

## 2018-03-15 PROCEDURE — 93005 ELECTROCARDIOGRAM TRACING: CPT

## 2018-03-15 PROCEDURE — 71045 X-RAY EXAM CHEST 1 VIEW: CPT

## 2018-03-15 PROCEDURE — 90471 IMMUNIZATION ADMIN: CPT

## 2018-03-15 PROCEDURE — 85610 PROTHROMBIN TIME: CPT

## 2018-03-15 PROCEDURE — 93970 EXTREMITY STUDY: CPT

## 2018-03-15 PROCEDURE — 82553 CREATINE MB FRACTION: CPT

## 2018-03-15 NOTE — RAD
PORTABLE CHEST ONE VIEW:

3/15/18 at 3:57 p.m.

 

HISTORY: 

Dyspnea. 

 

FINDINGS/IMPRESSION:  

Comparison is made with exam of 3/8/18.

 

The heart is enlarged. There is continued elevation of the right hemidiaphragm. Left sided pacing dev
ice remains in place. No lobar consolidation, pneumothorax, sabrina pulmonary edema or large effusions 
are seen.

 

POS: [facility]

## 2018-03-15 NOTE — ULT
BILATERAL LOWER EXTREMITY VENOUS DUPLEX EXAM. 

 

Deep veins of both lower extremities evaluated with color doppler and spectral analysis and compressi
on.

 

INDICATIONS:

Dyspnea. Assess for DVT. 

 

Deep veins of both lower extremities shows normal blood flow and compression. No  evidence of DVT. 

 

IMPRESSION:  

No evidence of lower extremity DVT. 

 

POS: [facility]

## 2018-03-16 VITALS — SYSTOLIC BLOOD PRESSURE: 110 MMHG | DIASTOLIC BLOOD PRESSURE: 56 MMHG

## 2018-03-16 VITALS — TEMPERATURE: 98.2 F

## 2018-03-16 LAB
ANION GAP SERPL CALC-SCNC: 8 MMOL/L (ref 10–20)
BASOPHILS # BLD AUTO: 0 THOU/UL (ref 0–0.2)
BASOPHILS NFR BLD AUTO: 0.6 % (ref 0–1)
BUN SERPL-MCNC: 10 MG/DL (ref 8.4–25.7)
CALCIUM SERPL-MCNC: 7.9 MG/DL (ref 7.8–10.44)
CHLORIDE SERPL-SCNC: 94 MMOL/L (ref 98–107)
CO2 SERPL-SCNC: 37 MMOL/L (ref 23–31)
CREAT CL PREDICTED SERPL C-G-VRATE: 229 ML/MIN (ref 70–130)
EOSINOPHIL # BLD AUTO: 0.2 THOU/UL (ref 0–0.7)
EOSINOPHIL NFR BLD AUTO: 3.6 % (ref 0–10)
GLUCOSE SERPL-MCNC: 110 MG/DL (ref 80–115)
HGB BLD-MCNC: 9.4 G/DL (ref 14–18)
LYMPHOCYTES # BLD: 0.6 THOU/UL (ref 1.2–3.4)
LYMPHOCYTES NFR BLD AUTO: 12.1 % (ref 21–51)
MCH RBC QN AUTO: 31.4 PG (ref 27–31)
MCV RBC AUTO: 95.6 FL (ref 80–94)
MONOCYTES # BLD AUTO: 0.5 THOU/UL (ref 0.11–0.59)
MONOCYTES NFR BLD AUTO: 10.1 % (ref 0–10)
NEUTROPHILS # BLD AUTO: 3.6 THOU/UL (ref 1.4–6.5)
NEUTROPHILS NFR BLD AUTO: 73.7 % (ref 42–75)
PLATELET # BLD AUTO: 206 THOU/UL (ref 130–400)
POTASSIUM SERPL-SCNC: 3.8 MMOL/L (ref 3.5–5.1)
RBC # BLD AUTO: 3 MILL/UL (ref 4.7–6.1)
SODIUM SERPL-SCNC: 135 MMOL/L (ref 136–145)
WBC # BLD AUTO: 4.9 THOU/UL (ref 4.8–10.8)

## 2018-03-16 NOTE — HP
REASON FOR ADMISSION:  The patient fell while being transferred from chair to 
bed at nursing home.

 

HISTORY OF PRESENT ILLNESS:  The patient was discharged on the  to 
[facility] for severe deconditioning.  He has been at the rehab for 
last 2 days.  He was apparently sitting in the chair for an hour or so and the 
staff came back to take him back to bed.  Three people were trying to put him 
back to bed from chair when he slipped out and fell.  He had laceration of left 
lower extremity.  He has no complaints of chest pain, palpitations, PND or 
orthopnea.  No loss of consciousness.  No fever as such.  He was found to be 
hypoxic and was brought on a nonrebreather to the emergency room.  The patient 
was hospitalized recently for nearly 16 days in the hospital and has had 
extensive workup done.  He has severe deconditioning and is not motivated.  He 
was evaluated by multiple specialists during his stay here.

 

PAST MEDICAL AND SURGICAL HISTORY:  Recent hospitalization for community-
acquired pneumonia, chronic diastolic heart failure, sick sinus syndrome, 
status post pacemaker, atrial flutter, status post ablation, chronic 
respiratory failure with hypoxia, severe obesity, hypertension, obstructive 
sleep apnea, bilateral hip joint, severe osteoarthritis causing his immobility, 
chronic anemia, peptic ulcer disease with recent upper endoscopy for bleeding 
vessel.

 

CURRENT HOME MEDICATIONS:  Aspirin 81 mg p.o. daily, Avodart 0.5 mg p.o. daily, 
Lasix 40 mg p.o. daily, Mucinex 1200 mg p.o. twice daily, DuoNeb q.6 hourly 
p.r.n., Protonix 40 mg twice daily, Flomax 0.4 mg p.o. daily, Norvasc 5 mg p.o. 
daily, Coreg 6.25 mg twice daily, Plavix 75 mg daily, lisinopril 20 mg twice 
daily, Zocor 20 mg p.o. at bedtime.

 

ALLERGIES:  No known drug allergies.

 

PERSONAL HISTORY:  He quit smoking more than 10 years ago, does not abuse 
alcohol or drugs.  He is currently a resident of [facility] from 
last 2 days.

 

FAMILY HISTORY:  Mother  of lymphoma and its complications at the age of 62 
years.  Father  of heart failure and was 82 years old.

 

REVIEW OF SYSTEMS:  The following complete review of systems was negative, 
unless otherwise mentioned in the HPI or below:

Constitutional:  Weight loss or gain, ability to conduct usual activities.

Skin:  Rash, itching.

Eyes:  Double vision, pain.

ENT/Mouth:  Nose bleeding, neck stiffness, pain, tenderness.

Cardiovascular:  Palpitations, dyspnea on exertion, orthopnea.

Respiratory:  Shortness of breath, wheezing, cough, hemoptysis, fever or night 
sweats.

Gastrointestinal:  Poor appetite, abdominal pain, heartburn, nausea, vomiting, 
constipation, or diarrhea.

Genitourinary:  Urgency, frequency, dysuria, nocturia.

Musculoskeletal:  Pain, swelling.

Neurologic/Psychiatric:  Anxiety, depression.

Allergy/Immunologic:  Skin rash, bleeding tendency.

 

PHYSICAL EXAMINATION:

GENERAL:  The patient is a 72-year-old male who is currently not in any acute 
distress.

VITAL SIGNS:  Blood pressure 126/60, pulse 60 per minute, respiratory rate 20 
per minute, temperature 98.6 degrees Fahrenheit, saturating 94% on 4 liters 
nasal cannula.

NECK:  Supple, no elevated JVD.

HEENT:  Eyes:  Extraocular muscles intact.  Pupils reacting to light.  Oral 
cavity mucous membranes are dry.  No exudates or congestion.

CARDIOVASCULAR:  S1, S2 heard.  Regular rhythm.

RESPIRATORY:  Air entry 1+ bilateral.  Distant breath sounds.  Scattered rhonchi
, no rales or wheezes.

ABDOMEN:  Soft, bowel sounds heard.  No tenderness, rigidity or guarding.

EXTREMITIES:  There is a laceration over the left leg area, which has been 
treated in the ER.  He has had suturing done for the same.  There is 2+ 
peripheral edema, no calf tenderness.

VASCULAR SYSTEM:  Peripheral pulses 1+ bilateral.  No ischemic ulcerations.

CENTRAL NERVOUS SYSTEM:  No gross focal deficits noted.  The patient moves all 
4 extremities.

PSYCHIATRIC:  The patient is euthymic.  No hallucinations or delusions.

 

LABORATORY DATA AND X-RAY FINDINGS:  White count of 6.2, H&H is 10 and 33, 
platelet count 244 with 79% neutrophils, MCV is 98.  PT and INR are 16 and 1.3, 
PTT 30, serum bicarbonate 34, BUN 9, creatinine 0.6, glucose 131.  CK level is 
27, troponin I 0.03, CK-MB 1.1.  .  Albumin is 2.9.  Lipase is 16.  UA 
shows moderate leukoesterase, but there is rare few bacteria seen with 7-10 
WBCs.  Lower extremity venous Doppler done shows no evidence of DVT.  Left leg 
two view x-ray done shows no acute fracture.  There is well corticated bone 
density inferior to the medial malleolar area likely old avulsion injury.  
Chest x-ray done shows mild cardiomegaly, chronic elevation of right 
hemidiaphragm, no acute infiltrate seen.  Telemetry shows paced rhythm.

 

CLINICAL IMPRESSION AND PLAN:  The patient will be under observation on 
telemetry for an episode of fall while he was being transferred from chair to 
bed at the nursing home.  Later he was found to be hypoxic and was brought to 
emergency room on nonrebreather.  The patient is saturating at 96% on 4 liters 
nasal cannula.  The patient is known to be severely deconditioned with his 
recent hospitalization here.  He appears to be hemodynamically stable.  He will 
be closely monitored overnight and likely will be discharged back to [facility] today.  We will continue all his home medications as before 
including Flomax, Protonix, lisinopril, DuoNeb, Mucinex, Lasix, Avodart, Colace
, Plavix, aspirin, Coreg, Lipitor and Norvasc as before.  Please note, I have 
seen and examined the patient on 03/15/2018.

 

ALLIE

## 2018-03-21 ENCOUNTER — HOSPITAL ENCOUNTER (INPATIENT)
Dept: HOSPITAL 92 - ERS | Age: 73
LOS: 5 days | Discharge: NURSING, REHAB OR LONG-TERM CARE | DRG: 189 | End: 2018-03-26
Attending: FAMILY MEDICINE | Admitting: FAMILY MEDICINE
Payer: MEDICARE

## 2018-03-21 VITALS — BODY MASS INDEX: 39.8 KG/M2

## 2018-03-21 DIAGNOSIS — J96.21: Primary | ICD-10-CM

## 2018-03-21 DIAGNOSIS — Z66: ICD-10-CM

## 2018-03-21 DIAGNOSIS — I11.0: ICD-10-CM

## 2018-03-21 DIAGNOSIS — I50.32: ICD-10-CM

## 2018-03-21 DIAGNOSIS — Z99.81: ICD-10-CM

## 2018-03-21 DIAGNOSIS — E86.0: ICD-10-CM

## 2018-03-21 DIAGNOSIS — Z95.0: ICD-10-CM
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
## 2018-03-21 DIAGNOSIS — Z87.891: ICD-10-CM

## 2018-03-21 DIAGNOSIS — Z95.5: ICD-10-CM

## 2018-03-21 DIAGNOSIS — S81.812D: ICD-10-CM

## 2018-03-21 DIAGNOSIS — M16.0: ICD-10-CM

## 2018-03-21 DIAGNOSIS — E11.9: ICD-10-CM

## 2018-03-21 DIAGNOSIS — G93.41: ICD-10-CM

## 2018-03-21 DIAGNOSIS — I25.10: ICD-10-CM

## 2018-03-21 DIAGNOSIS — E66.2: ICD-10-CM

## 2018-03-21 DIAGNOSIS — Z91.19: ICD-10-CM

## 2018-03-21 DIAGNOSIS — J96.22: ICD-10-CM

## 2018-03-21 DIAGNOSIS — I95.9: ICD-10-CM

## 2018-03-21 DIAGNOSIS — Z51.5: ICD-10-CM

## 2018-03-21 DIAGNOSIS — Z91.81: ICD-10-CM

## 2018-03-21 DIAGNOSIS — D50.0: ICD-10-CM

## 2018-03-21 LAB
ALBUMIN SERPL BCG-MCNC: 3.1 G/DL (ref 3.4–4.8)
ALP SERPL-CCNC: 179 U/L (ref 40–150)
ALT SERPL W P-5'-P-CCNC: 58 U/L (ref 8–55)
ANALYZER IN CARDIO: (no result)
ANALYZER IN CARDIO: (no result)
ANION GAP SERPL CALC-SCNC: 13 MMOL/L (ref 10–20)
AST SERPL-CCNC: 22 U/L (ref 5–34)
BASE EXCESS STD BLDA CALC-SCNC: 7.7 MEQ/L
BASE EXCESS STD BLDA CALC-SCNC: 7.8 MEQ/L
BASOPHILS # BLD AUTO: 0.1 THOU/UL (ref 0–0.2)
BASOPHILS NFR BLD AUTO: 1.5 % (ref 0–1)
BILIRUB SERPL-MCNC: 0.6 MG/DL (ref 0.2–1.2)
BUN SERPL-MCNC: 18 MG/DL (ref 8.4–25.7)
CA-I BLDA-SCNC: 1.1 MMOL/L (ref 1.12–1.3)
CA-I BLDA-SCNC: 1.1 MMOL/L (ref 1.12–1.3)
CALCIUM SERPL-MCNC: 8.2 MG/DL (ref 7.8–10.44)
CASTS #/AREA URNS LPF: (no result) LPF
CHLORIDE SERPL-SCNC: 95 MMOL/L (ref 98–107)
CK MB SERPL-MCNC: 1.1 NG/ML (ref 0–6.6)
CO2 SERPL-SCNC: 29 MMOL/L (ref 23–31)
CREAT CL PREDICTED SERPL C-G-VRATE: 0 ML/MIN (ref 70–130)
CRYSTAL-AUWI FLAG: 2.7 (ref 0–15)
EOSINOPHIL # BLD AUTO: 0.1 THOU/UL (ref 0–0.7)
EOSINOPHIL NFR BLD AUTO: 2 % (ref 0–10)
GLOBULIN SER CALC-MCNC: 3.1 G/DL (ref 2.4–3.5)
GLUCOSE SERPL-MCNC: 151 MG/DL (ref 80–115)
HCO3 BLDA-SCNC: 35.1 MEQ/L (ref 22–26)
HCO3 BLDA-SCNC: 35.4 MEQ/L (ref 22–26)
HCT VFR BLDA CALC: 34.6 % (ref 42–52)
HCT VFR BLDA CALC: 37.2 % (ref 42–52)
HEV IGM SER QL: 20.3 (ref 0–7.99)
HGB BLD-MCNC: 10.8 G/DL (ref 14–18)
HGB BLDA-MCNC: 10.3 G/DL (ref 14–18)
HGB BLDA-MCNC: 9.8 G/DL (ref 14–18)
LYMPHOCYTES # BLD: 0.2 THOU/UL (ref 1.2–3.4)
LYMPHOCYTES NFR BLD AUTO: 3.8 % (ref 21–51)
MCH RBC QN AUTO: 30.8 PG (ref 27–31)
MCV RBC AUTO: 95.5 FL (ref 80–94)
MONOCYTES # BLD AUTO: 0.3 THOU/UL (ref 0.11–0.59)
MONOCYTES NFR BLD AUTO: 5 % (ref 0–10)
NEUTROPHILS # BLD AUTO: 4.5 THOU/UL (ref 1.4–6.5)
NEUTROPHILS NFR BLD AUTO: 87.6 % (ref 42–75)
O2 A-A PPRESDIFF RESPIRATORY: 142.78 MM[HG] (ref 0–20)
PATHC CAST-AUWI FLAG: 8.14 (ref 0–2.49)
PCO2 BLDA: 66.2 MMHG (ref 35–45)
PCO2 BLDA: 67.7 MMHG (ref 35–45)
PH BLDA: 7.34 [PH] (ref 7.35–7.45)
PH BLDA: 7.34 [PH] (ref 7.35–7.45)
PLATELET # BLD AUTO: 172 THOU/UL (ref 130–400)
PO2 BLDA: 55.8 MMHG (ref 80–100)
PO2 BLDA: 62.6 MMHG (ref 80–100)
POTASSIUM SERPL-SCNC: 5 MMOL/L (ref 3.5–5.1)
PROT UR STRIP.AUTO-MCNC: 30 MG/DL
RBC # BLD AUTO: 3.51 MILL/UL (ref 4.7–6.1)
SODIUM SERPL-SCNC: 132 MMOL/L (ref 136–145)
SP GR UR STRIP: 1.02 (ref 1–1.04)
SPECIMEN DRAWN FROM PATIENT: (no result)
SPECIMEN DRAWN FROM PATIENT: (no result)
SPERM-AUWI FLAG: 0 (ref 0–9.9)
TROPONIN I SERPL DL<=0.01 NG/ML-MCNC: 0.04 NG/ML (ref ?–0.03)
WBC # BLD AUTO: 5.1 THOU/UL (ref 4.8–10.8)
YEAST-AUWI FLAG: 0 (ref 0–25)

## 2018-03-21 PROCEDURE — 87086 URINE CULTURE/COLONY COUNT: CPT

## 2018-03-21 PROCEDURE — 82805 BLOOD GASES W/O2 SATURATION: CPT

## 2018-03-21 PROCEDURE — 93005 ELECTROCARDIOGRAM TRACING: CPT

## 2018-03-21 PROCEDURE — 81001 URINALYSIS AUTO W/SCOPE: CPT

## 2018-03-21 PROCEDURE — 85007 BL SMEAR W/DIFF WBC COUNT: CPT

## 2018-03-21 PROCEDURE — 96360 HYDRATION IV INFUSION INIT: CPT

## 2018-03-21 PROCEDURE — 83540 ASSAY OF IRON: CPT

## 2018-03-21 PROCEDURE — 36415 COLL VENOUS BLD VENIPUNCTURE: CPT

## 2018-03-21 PROCEDURE — 94640 AIRWAY INHALATION TREATMENT: CPT

## 2018-03-21 PROCEDURE — 83605 ASSAY OF LACTIC ACID: CPT

## 2018-03-21 PROCEDURE — 83880 ASSAY OF NATRIURETIC PEPTIDE: CPT

## 2018-03-21 PROCEDURE — 85027 COMPLETE CBC AUTOMATED: CPT

## 2018-03-21 PROCEDURE — 85025 COMPLETE CBC W/AUTO DIFF WBC: CPT

## 2018-03-21 PROCEDURE — 80053 COMPREHEN METABOLIC PANEL: CPT

## 2018-03-21 PROCEDURE — 84443 ASSAY THYROID STIM HORMONE: CPT

## 2018-03-21 PROCEDURE — 82553 CREATINE MB FRACTION: CPT

## 2018-03-21 PROCEDURE — 5A09557 ASSISTANCE WITH RESPIRATORY VENTILATION, GREATER THAN 96 CONSECUTIVE HOURS, CONTINUOUS POSITIVE AIRWAY PRESSURE: ICD-10-PCS | Performed by: INTERNAL MEDICINE

## 2018-03-21 PROCEDURE — 80048 BASIC METABOLIC PNL TOTAL CA: CPT

## 2018-03-21 PROCEDURE — 84484 ASSAY OF TROPONIN QUANT: CPT

## 2018-03-21 PROCEDURE — 83550 IRON BINDING TEST: CPT

## 2018-03-21 PROCEDURE — 87040 BLOOD CULTURE FOR BACTERIA: CPT

## 2018-03-21 PROCEDURE — 94660 CPAP INITIATION&MGMT: CPT

## 2018-03-21 PROCEDURE — 71045 X-RAY EXAM CHEST 1 VIEW: CPT

## 2018-03-21 RX ADMIN — FAMOTIDINE SCH MG: 10 INJECTION, SOLUTION INTRAVENOUS at 22:15

## 2018-03-21 NOTE — RAD
PORTABLE UPRIGHT CHEST ONE VIEW:

 

History: 

72-year-old male with history of dyspnea. Difficulty breathing. Previously treated for pneumonia. Sep
sis alert. 

 

FINDINGS: 

Poor inspiration with borderline cardiomegaly. Left ICD. Stable right hemidiaphragm elevation and gentry
ear parenchymal changes in the right infrahilar region and right lower lobe and mild stable stranding
 in the left lower lobe. No new confluent process. Arthrosis changes of both shoulders. 

 

IMPRESSION: 

Overall stable appearing chest. No significant new confluent process. 

 

POS: [facility]

## 2018-03-21 NOTE — HP
DATE OF ADMISSION:  2018

 

PRIMARY CARE PROVIDER:  Ekaterina Norris M.D.

 

CHIEF COMPLAINT:  Shortness of breath and lethargy.

 

HISTORY OF PRESENT ILLNESS:  This is a 72-year-old  male who is a current resident of [facility] after recent 16-day hospital stay at [facility], 
discharged on 2018 after being diagnosed with a community-acquired pneumonia as well as upper G
I bleed secondary to peptic ulcer, undergoing an ablation and cauterization.  The patient was also tr
eated for congestive heart failure as well as evaluated for sick sinus syndrome, status post pacemake
r placement.  The patient had a complicated course, treated for sepsis due to the pneumonia with angelina
re deconditioning and oxygen requirement on discharge.  The patient apparently was discharged to [facility] where he was attempting to transfer from a chair when he apparently fe
ll, striking his left leg with a laceration of the lower extremity.  The patient underwent primary re
pair and was observed at [facility] for approximately 24 hours, released back to [facility].  The patient presents back to [facility] on 2018 with i
ncreased shortness of breath, which the wife states began in the early morning hours on 2018.  
Wife reports the patient was noted with a low blood pressure in the 70s systolic with increased work 
of breathing.  The patient apparently was noted with oxygen saturations in the mid 80% range on 4 lit
ers per nasal cannula.  The patient was noted to be lethargic, minimally responsive, and unable to si
t up.  The patient was noted to be slightly pale and cyanotic at his lips and was placed on continuou
s positive airway pressure, noninvasive mechanical ventilation in the emergency room.  The patient re
ceived bronchodilator therapy with DuoNebs x2 with chest imaging showing no acute infiltrate.  The pa
tient received IV normal saline in the emergency room and continued on continuous positive airway pre
ssure, noninvasive mechanical ventilation.  The history was obtained after discussions with the patie
nt's wife at the bedside as well as review of the emergency room records.  The patient is currently u
nable to provide any history and is obtunded.

 

PAST MEDICAL HISTORY:

1.  Status post community-acquired pneumonia from 2018 through 2018.

2.  Status post sepsis, secondarily to #1.

3.  Peptic ulcer disease with bleeding vessel, status post cauterization.

4.  Acute blood loss anemia secondary to #3.

5.  Acute on chronic diastolic congestive heart failure with preserved ejection fraction of 50%-55%.

6.  Sick sinus syndrome, status post pacemaker placement.

7.  History of atrial flutter, status post ablation.

8.  Acute on chronic hypoxemic respiratory failure, multifactorial.

9.  Status post mechanical ventilation.

10.  Morbid obesity.

11.  Hypertension.

12.  Obstructive sleep apnea.

13.  Status post mechanical fall with left lower extremity laceration.

14.  Osteoarthritis.

 

PAST SURGICAL HISTORY:  Status post endoscopy with cauterization of visible vessel.

 

CURRENT MEDICATIONS:

1.  Tylenol 650 mg per rectum q.4 hours p.r.n.

2.  Norvasc 5 mg 1 tablet p.o. daily.

3.  Enteric coated aspirin 81 mg 1 tab p.o. daily.

4.  Coreg 6.25 mg p.o. b.i.d.

5.  Plavix 75 mg p.o. daily.

6.  Avodart 0.5 mg p.o. daily.

7.  Proscar 5 mg p.o. daily.

8.  Lasix 40 mg p.o. daily.

9.  Robitussin-DM 15 mL p.o. q.4 h. p.r.n.

10.  Guaifenesin ER 1200 mg p.o. b.i.d.

11.  DuoNeb 3 mL nebulized q.6 h. p.r.n.

12.  Lisinopril 20 mg p.o. b.i.d.

13.  Protonix 40 mg p.o. b.i.d.

14.  Simvastatin 20 mg p.o. daily.

15.  Flomax 0.4 mg p.o. daily.

 

ALLERGIES:  No known drug allergies.

 

FAMILY HISTORY:  Mother  of complications of lymphoma at the age of 62.  Father  of complicat
ions of congestive heart failure at 82 years of age.

 

SOCIAL HISTORY:  The patient is  and resides in the [place], [place] area.  Former tobacco use, qu
itting approximately 10 years prior to this evaluation.  No current alcohol or illicit drug use.  Non
ambulatory status.  History of falls with left lower extremity laceration.  Current resident of [facility].

 

REVIEW OF SYSTEMS:  Unobtainable as patient is obtunded on BiPAP noninvasive mechanical ventilation.

 

PHYSICAL EXAMINATION:

VITAL SIGNS:  Currently, blood pressure 96/47, pulse 60, respiratory rate 22, temperature 98.6 degree
s Fahrenheit, O2 saturation 97% on BiPAP noninvasive mechanical ventilation.

GENERAL APPEARANCE:  This is a 72-year-old  male on current BiPAP noninvasive mechanical chris
tilation, pale, sallow appearing, minimally responsive to name and painful stimulus with brief eye op
ening.

HEENT:  Pupils are equal, round, and reactive to light and accommodation.  Extraocular muscles are in
tact.  Nares patent.  OP is clear.  Oral mucosa dry appearing.  Scalp is atraumatic.

NECK:  Supple, no cervical adenopathy, no thyromegaly, no carotid bruits, no JVD appreciated.  No pal
pable mass.

CHEST:  Diminished breath sounds in the bases bilaterally.  Lungs are clear.

CARDIOVASCULAR:  S1, S2 with distant heart sounds.  Left-sided pacemaker device in place.

ABDOMEN:  Obese, soft, nontender, nondistended.  Landmarks are difficult to palpate due to patient's 
body habitus.

EXTREMITIES:  Bilateral skin changes consistent with chronic lymphedema.  Open wound to the left lowe
r extremity of the lateral mid tibia region.  Serosanguineous drainage noted.  Positive edema bilater
ally.  Pulses diminished bilaterally at the dorsalis pedis, posterior tibial, and popliteal arteries 
bilaterally.  Extremities are cool to touch.

NEUROLOGIC:  Brief eye opening to name and painful stimuli.  Does not follow complex commands.

GENITOURINARY:  Sommer catheter in place with dark anastasiya urine.

 

PERTINENT LABORATORY AND X-RAY FINDINGS:  Sodium 132, potassium 5.0, chloride 95, CO2 of 29, BUN 18, 
creatinine 0.87, estimated GFR of 87, glucose 151.  Lactic acid level 1.0, calcium 8.2, AST 22, ALT o
f 58, alkaline phosphatase 179.  Troponin I 0.038.  , previously noted 116 on 03/15/2018.  CBC
 showed a white blood cell count of 5.1, hemoglobin 11, hematocrit 34, MCV 96, platelet count 172 wit
h 88% neutrophilia.  ABG dated 2018 showed a pH 7.34, pCO2 of 66, pO2 of 63, bicarbonate 35, O2
 saturation 92% on BiPAP noninvasive mechanical ventilation, 40% FIO2.  Portable chest x-ray dated  showed no acute cardiopulmonary process.  Left ICD/pacemaker device in place.  Right hemidia
phragm elevation.  Telemetry monitoring shows a paced rhythm with heart rates in the 60s.

 

ASSESSMENT AND PLAN:

1.  Acute on chronic hypoxemic hypercapnic respiratory failure.  Exact etiology unclear.  No current 
evidence to suggest decompensated congestive heart failure or persistent pneumonia.  Continue BiPAP n
oninvasive mechanical ventilation with FiO2 40%.  We will titrate to clinical response and wean as cl
inically appropriate.  Consult Pulmonology Service for further evaluation.  Continue general bronchod
ilator therapy with DuoNeb, Solu-Medrol 40 mg IV q.6 hours.

2.  Acute encephalopathy, likely metabolic.  We will continue general supportive measures.  Exact dave
ology unclear and likely multifactorial with a component of hypercapnia and hypoxemia.  We will ernestina
nue treatment as outlined in #1.

3.  Chronic troponin elevation, likely demand ischemia.  We will continue supportive measures.  No ev
idence to suggest acute coronary syndrome.  Monitor clinically.

4.  Chronic macrocytic anemia.  Stable currently.  Status post upper gastrointestinal bleed secondary
 to bleeding peptic ulcer with cauterization.  No current evidence to suggest acute blood loss.  Repe
at CBC in the a.m.

5.  Status post mechanical fall with left lower extremity laceration.  Stable currently.  We will con
[facility] Wound Care service for evaluation and local care of the left lower extremity.

6.  History of sick sinus syndrome, status post pacemaker placement.  Continue telemetry monitoring. 
 Current pacemaker device with normal functioning device.

7.  Severe deconditioning.  PT evaluation when patient clinically stabilized.  General fall risk prec
autions.

8.  Hypotension.  Suspect secondary to volume depletion and dehydration.  We will continue intravenou
s normal saline at 75 mL per hour.  Monitor for clinical response.  Hold all antihypertensive medicat
ions.

9.  Prophylaxis.  Lovenox 40 mg subcutaneously daily, Pepcid 20 mg IV q.12 hours.

10.  Code status:  Do not resuscitate, confirmed with patient's wife.  Surrogate medical decision todd
er is patient's wife.

11.  Consult Palliative Care service for consideration of hospice.

## 2018-03-22 LAB
ALBUMIN SERPL BCG-MCNC: 2.7 G/DL (ref 3.4–4.8)
ALP SERPL-CCNC: 132 U/L (ref 40–150)
ALT SERPL W P-5'-P-CCNC: 37 U/L (ref 8–55)
ANION GAP SERPL CALC-SCNC: 12 MMOL/L (ref 10–20)
AST SERPL-CCNC: 15 U/L (ref 5–34)
BILIRUB SERPL-MCNC: 0.3 MG/DL (ref 0.2–1.2)
BUN SERPL-MCNC: 26 MG/DL (ref 8.4–25.7)
CALCIUM SERPL-MCNC: 7.9 MG/DL (ref 7.8–10.44)
CHLORIDE SERPL-SCNC: 98 MMOL/L (ref 98–107)
CO2 SERPL-SCNC: 29 MMOL/L (ref 23–31)
CREAT CL PREDICTED SERPL C-G-VRATE: 131 ML/MIN (ref 70–130)
GLOBULIN SER CALC-MCNC: 2.6 G/DL (ref 2.4–3.5)
GLUCOSE SERPL-MCNC: 133 MG/DL (ref 80–115)
HGB BLD-MCNC: 10 G/DL (ref 14–18)
MCH RBC QN AUTO: 30.5 PG (ref 27–31)
MCV RBC AUTO: 95.5 FL (ref 80–94)
MDIFF COMPLETE?: YES
OVALOCYTES BLD QL SMEAR: (no result) (100X)
PLATELET # BLD AUTO: 160 THOU/UL (ref 130–400)
PLATELET BLD QL SMEAR: (no result)
POTASSIUM SERPL-SCNC: 4.7 MMOL/L (ref 3.5–5.1)
RBC # BLD AUTO: 3.3 MILL/UL (ref 4.7–6.1)
SODIUM SERPL-SCNC: 134 MMOL/L (ref 136–145)
WBC # BLD AUTO: 3 THOU/UL (ref 4.8–10.8)

## 2018-03-22 RX ADMIN — FAMOTIDINE SCH MG: 10 INJECTION, SOLUTION INTRAVENOUS at 08:42

## 2018-03-22 RX ADMIN — Medication SCH ML: at 21:05

## 2018-03-22 RX ADMIN — ASPIRIN SCH: 81 TABLET ORAL at 10:26

## 2018-03-22 NOTE — PDOC.PN
- Subjective


Encounter Start Date: 03/22/18


Encounter Start Time: 15:05


Subjective: f/u for acute resp failure on BiPAP NIMV and hypotension with 


-: encephalopathy. Markedly improved today, awake and feels ok.


-: Does not remember last 24h. No fever, CP.





- Objective


Resuscitation Status: 


 











Resuscitation Status           DNR:Do Not Resuscitate














MAR Reviewed: Yes


Vital Signs & Weight: 


 Vital Signs (12 hours)











  Temp Pulse Resp BP Pulse Ox


 


 03/22/18 12:13   73  21 H   93 L


 


 03/22/18 11:00  98.9 F  67  19  122/52 L  92 L


 


 03/22/18 08:17      93 L


 


 03/22/18 08:14   63  18   93 L


 


 03/22/18 08:00  98.2 F  63  18   97


 


 03/22/18 07:51  98.8 F  60  20  111/47 L 


 


 03/22/18 03:57  98.5 F  61  20  105/43 L  97








 Weight











Admit Weight                   262 lb


 


Weight                         262 lb














I&O: 


 











 03/21/18 03/22/18 03/23/18





 06:59 06:59 06:59


 


Intake Total  875 


 


Output Total  550 


 


Balance  325 











Result Diagrams: 


 03/22/18 04:49





 03/22/18 04:49


Additional Labs: 





Microbiology





03/21/18 18:50   Urine granados catheter   Urine Culture - Preliminary


                              NO GROWTH AT 24 HOURS


03/21/18 09:43   Venous blood - Right Hand   Blood Culture - Preliminary


                              Specimen has been received and culture in 

progress.


                              No Growth to date.


03/21/18 09:43   Venous blood - Left Hand   Blood Culture - Preliminary


                              Specimen has been received and culture in 

progress.


                              No Growth to date.





 Laboratory Tests











  03/21/18 03/21/18 03/21/18





  09:46 09:46 09:46


 


Hgb  10.8 L  


 


Plt Count  172  


 


Neutrophils %  87.6 H  


 


Neutrophils % (Manual)   


 


Sodium    132 L


 


ALT    58 H


 


Alkaline Phosphatase    179 H


 


B-Natriuretic Peptide   286.1 H 


 


TSH 3rd Generation   














  03/21/18 03/22/18 03/22/18





  15:47 04:49 04:49


 


Hgb   


 


Plt Count   


 


Neutrophils %   


 


Neutrophils % (Manual)    85 H


 


Sodium   


 


ALT   37 


 


Alkaline Phosphatase   132 


 


B-Natriuretic Peptide   


 


TSH 3rd Generation  0.3628  











EKG Reviewed by me: Yes (Tele - SR in 60's)





Phys Exam





- Physical Examination


Constitutional: NAD


awake, responsive, smiles


HEENT: PERRLA, oral pharynx no lesions


Neck: no JVD, supple


Respiratory: no wheezing, clear to auscultation bilateral


Cardiovascular: RRR


Gastrointestinal: soft, non-tender, no distention, positive bowel sounds


chronic vensous stasis changes, LLE with superficial wound with


dressing in place


Musculoskeletal: pulses present, edema present


Neurological: normal sensation, moves all 4 limbs


Psychiatric: A&O x 3


Skin: normal turgor, cap refill <2 seconds





Dx/Plan


(1) Acute and chronic respiratory failure


Code(s): J96.20 - ACUTE AND CHR RESP FAILURE, UNSP W HYPOXIA OR HYPERCAPNIA   

Status: Acute   


Qualifiers: 


   Respiratory failure complication: hypoxia and hypercapnia   Qualified Code(s)

: J96.21 - Acute and chronic respiratory failure with hypoxia; J96.22 - Acute 

and chronic respiratory failure with hypercapnia; J96.22 - Acute and chronic 

respiratory failure with hypercapnia; J96.22 - Acute and chronic respiratory 

failure with hypercapnia   


Comment: Improved and off BiPAP, continue O2 via NC, general pulmonary support 

  





(2) Encephalopathy acute


Code(s): G93.40 - ENCEPHALOPATHY, UNSPECIFIED   Status: Acute   Comment: Likely 

metabolic and multifactorial with hypoxia and hypotension, resolved   





(3) Hypotension


Status: Acute   Comment: Suspect iatrogenic, over diuresed, encourage increase 

free-H2O intake, hold home BP regimen, resolving   





(4) Severe muscle deconditioning


Code(s): R29.898 - [facility] SYMPTOMS AND SIGNS INVOLVING THE MUSCULOSKELETAL SYSTEM 

  Status: Chronic   Comment: PT/OT for mobilization   





(5) Bilateral hip joint arthritis


Code(s): M16.0 - BILATERAL PRIMARY OSTEOARTHRITIS OF HIP   Status: Chronic   

Comment: Resume pain medication now that breathing has stablized.   





(6) Morbid obesity


Code(s): E66.01 - MORBID (SEVERE) OBESITY DUE TO EXCESS CALORIES   Status: 

Chronic   





(7) Obesity hypoventilation syndrome


Code(s): E66.2 - MORBID (SEVERE) OBESITY WITH ALVEOLAR HYPOVENTILATION   Status

: Suspected   Comment: O2 via NC continuously   





- Plan


plan discussed w/ family, PT/OT, , speech therapy, respiratory 

therapy, DVT proph w/SCDs


Stable currently


-: Continue O2 via NC


-: Hold home BP regimen


-: Pulmonary supportive measures


-: PT for mobilization





* Review and titrate down BP regimen, suspect over medicated


* AM lab: BMP, CBC

## 2018-03-22 NOTE — CON
DATE OF CONSULTATION:  03/21/2018

 

HISTORY OF PRESENT ILLNESS:  Mr. Herbert is a 72-year-old morbidly obese gentleman, multiple admissions 
in the last month.  In fact, he was just discharged from the hospital on 03/16/2018.

 

He comes back again to the hospital with progressive shortness of breath and lethargy.  Blood gas stephanie
ws respiratory acidosis, placed on noninvasive ventilation.  He apparently fell being transferred fro
m a chair to a bed in the nursing home and was discharged back again to the nursing home.

 

Severely deconditioning from the [facility] with multiple medical issues.

 

ER note today states that he was short of breath, but there is no additional fever or chills.  He is 
clearly lethargic.  His sats were 80% when he arrived. _____ in the mid 60s, placed on 4 liters and p
laced on BiPAP.

 

PAST MEDICAL HISTORY:  Extensively and well outlined including, CHF, diastolic dysfunction, hyperlipi
demia, COPD, hypoventilation, diabetes, and coronary artery disease.

 

PAST SURGICAL HISTORY:  Multiple stents, recent pacemaker inserted.

 

MEDICATIONS:  From home includes Flomax, Mucinex, Zocor, Protonix, DuoNeb, Lasix, Avodart, Plavix, Co
reg, Norvasc, and Tylenol.

 

ALLERGIES:  None.

 

SOCIAL HISTORY:  Tobacco, quit smoking years ago.

 

REVIEW OF SYSTEMS:  He is lethargic, difficult to get any additional information.

 

PHYSICAL EXAMINATION:

VITAL SIGNS:  His sats are 94, pulse 60.

GENERAL:  He is lethargic.

CHEST:  Decreased breath sounds, no wheezing.

CARDIAC:  Normal S1, S2.  No gallops.

ABDOMEN:  No masses.

EXTREMITIES:  Trace edema.

 

LABORATORY DATA:  White count 5000, hemoglobin and hematocrit 10 and 30, platelet count 172.  PO2 of 
55, pCO2 of 67, pH 7.34. BiPAP 14/7, rate of 12, 40%.  BNP is 26.

 

IMAGING:  X-ray shows chronic right-sided infiltrate, but I do not see anything new on the x-ray.

 

IMPRESSION:

1.  _____ respiratory failure, hypoventilation.

2.  Chronic obstructive pulmonary disease.

3.  Recent pacemaker.

4.  Benign prostatic hypertrophy.

5.  Diabetes.

6.  Diastolic dysfunction.

 

PLAN:  Blood gas is being ordered.  Continue aggressive neb treatments and steroids.  His pCO2 was ma
rkedly elevated.  He may require intubation and vent support.

 

In the meantime, minimize sedation.

 

We will reassess the situation.  Proton pump inhibitor and DVT prophylaxis.

 

This is a critical care note, 40 minutes.  We will reassess him once we have blood gases.

## 2018-03-22 NOTE — PQF
DATE:    3-22-18                                                         ATTN: 
DR. PRABHU OLIVARES



Please exercise your independent, professional judgment in responding to the 
clarification form. 

Clinical indicators are provided on the bottom of this form for your review



Please check appropriate box(s):

[  ] UTI  please specify if due to or related to (as applicable):            

              [  ] Indwelling catheter                  

[ x ] Contaminated urine specimen without UTI

[  ] Other diagnosis ___________

[  ] Unable to determine



In addition, please specify:

Present on Admission (POA):  [ x ] Yes             [  ] No             [  ] 
Unable to determine



For continuity of documentation, please document condition throughout progress 
notes and discharge summary.  Thank You.



CLINICAL INDICATORS - SIGNS / SYMPTOMS / LABS



ER DOCUMENTATION:   [facility]



URINE:   3-21-18:   URINE PROTEIN:   30     H

                             URINE BLOOD:     SMALL   H

                             URINE BILIRUBIN:   SMALL  H

                             UR LEUKOCYTE ESTERASE:      SMALL H

                             URINE RBC:    11-20    H

                             URINE WBC:    11-20   H

                             UR SQUAMOUS EPITH CELLS:     4-6     H

                             HYALINE CASTS:     21-50  HYALINE CASTS    H



RISK FACTORS:   ER DOCUMENTATION:   PT ARRIVED FROM [facility] WITH [facility] CATH 
IN PLACE.

                            ER DOCUMENTATION:   NURSING HOME



TREATMENT:  (MAR)  IVF



(This form is maintained as a part of the permanent medical record)

 2015 [email or web address].  All Rights Reserved

[email or web address]    Office:  [phone]



[facility]

## 2018-03-23 LAB
ANION GAP SERPL CALC-SCNC: 8 MMOL/L (ref 10–20)
BUN SERPL-MCNC: 27 MG/DL (ref 8.4–25.7)
CALCIUM SERPL-MCNC: 8 MG/DL (ref 7.8–10.44)
CHLORIDE SERPL-SCNC: 98 MMOL/L (ref 98–107)
CO2 SERPL-SCNC: 36 MMOL/L (ref 23–31)
CREAT CL PREDICTED SERPL C-G-VRATE: 136 ML/MIN (ref 70–130)
GLUCOSE SERPL-MCNC: 144 MG/DL (ref 80–115)
HGB BLD-MCNC: 9.5 G/DL (ref 14–18)
MCH RBC QN AUTO: 29.9 PG (ref 27–31)
MCV RBC AUTO: 96 FL (ref 80–94)
MDIFF COMPLETE?: YES
PLATELET # BLD AUTO: 168 THOU/UL (ref 130–400)
PLATELET BLD QL SMEAR: (no result)
POTASSIUM SERPL-SCNC: 4.6 MMOL/L (ref 3.5–5.1)
RBC # BLD AUTO: 3.16 MILL/UL (ref 4.7–6.1)
SODIUM SERPL-SCNC: 137 MMOL/L (ref 136–145)
WBC # BLD AUTO: 3.9 THOU/UL (ref 4.8–10.8)

## 2018-03-23 RX ADMIN — ASPIRIN SCH MG: 81 TABLET ORAL at 09:02

## 2018-03-23 RX ADMIN — Medication SCH ML: at 09:04

## 2018-03-23 RX ADMIN — Medication SCH ML: at 19:32

## 2018-03-23 NOTE — PDOC.PN
- Subjective


Encounter Start Date: 03/23/18


Encounter Start Time: 10:20


Subjective: f/u for acute encephalopathy, resp failure with rapid recovery. 

States 


-: feeling fine overall. No CP, fever. Tolerating po intake. Working with PT


-: this am. 





- Objective


Resuscitation Status: 


 











Resuscitation Status           DNR:Do Not Resuscitate














MAR Reviewed: Yes


Vital Signs & Weight: 


 Vital Signs (12 hours)











  Temp Pulse Resp BP Pulse Ox


 


 03/23/18 08:16      97


 


 03/23/18 08:15   69  19   97


 


 03/23/18 07:00  98.1 F  61  24 H  123/61  96


 


 03/23/18 04:00  97.2 F L  60  18  122/56 L  93 L


 


 03/23/18 00:00  98.2 F  89  19  119/54 L  93 L


 


 03/22/18 23:35   60  16   90 L








 Weight











Admit Weight                   262 lb


 


Weight                         262 lb














I&O: 


 











 03/22/18 03/23/18 03/24/18





 06:59 06:59 06:59


 


Intake Total 875 1410 


 


Output Total 550 1300 


 


Balance 325 110 











Result Diagrams: 


 03/23/18 03:33





 03/23/18 03:33


Additional Labs: 





Microbiology





03/21/18 18:50   Urine granados catheter   Urine Culture - Preliminary


                              NO GROWTH AT 24 HOURS


03/21/18 09:43   Venous blood - Right Hand   Blood Culture - Preliminary


                              Specimen has been received and culture in 

progress.


                              No Growth to date.


03/21/18 09:43   Venous blood - Left Hand   Blood Culture - Preliminary


                              Specimen has been received and culture in 

progress.


                              No Growth to date.





 Laboratory Tests











  03/21/18 03/21/18 03/21/18





  09:46 09:46 09:46


 


WBC   


 


Hgb  10.8 L  


 


Plt Count  172  


 


Neutrophils %  87.6 H  


 


Neutrophils % (Manual)   


 


Sodium    132 L


 


ALT    58 H


 


Alkaline Phosphatase    179 H


 


B-Natriuretic Peptide   286.1 H 


 


TSH 3rd Generation   














  03/21/18 03/22/18 03/22/18





  15:47 04:49 04:49


 


WBC    3.0 L


 


Hgb    10.0 L


 


Plt Count   


 


Neutrophils %   


 


Neutrophils % (Manual)    85 H


 


Sodium   


 


ALT   37 


 


Alkaline Phosphatase   132 


 


B-Natriuretic Peptide   


 


TSH 3rd Generation  0.3628  











EKG Reviewed by me: Yes (Tele - A-paced in 60's)





Phys Exam





- Physical Examination


Constitutional: NAD


HEENT: PERRLA, oral pharynx no lesions


Neck: no JVD, supple


diminished in bases o/w clear


Cardiovascular: RRR


Gastrointestinal: soft, non-tender, no distention, positive bowel sounds


Superficial abrasion to LLE


Musculoskeletal: pulses present, edema present


Neurological: normal sensation, moves all 4 limbs


Psychiatric: A&O x 3


Skin: normal turgor, cap refill <2 seconds





Dx/Plan


(1) Acute and chronic respiratory failure


Code(s): J96.20 - ACUTE AND CHR RESP FAILURE, UNSP W HYPOXIA OR HYPERCAPNIA   

Status: Acute   


Qualifiers: 


   Respiratory failure complication: hypoxia and hypercapnia 


Comment: Improved and off BiPAP, continue O2 via NC, general pulmonary support 

  





(2) Encephalopathy acute


Code(s): G93.40 - ENCEPHALOPATHY, UNSPECIFIED   Status: Acute   Comment: Likely 

metabolic and multifactorial with hypoxia and hypotension, resolved   





(3) Hypotension


Status: Acute   Comment: Suspect iatrogenic, over diuresed, encourage increase 

free-H2O intake, hold home BP regimen, resolving   





(4) Severe muscle deconditioning


Code(s): R29.898 - OTH SYMPTOMS AND SIGNS INVOLVING THE MUSCULOSKELETAL SYSTEM 

  Status: Chronic   Comment: PT/OT for mobilization, OOB as tolerated with 

assistance   





(5) Bilateral hip joint arthritis


Code(s): M16.0 - BILATERAL PRIMARY OSTEOARTHRITIS OF HIP   Status: Chronic   

Comment: Pain control, mobilization, PT   





(6) Morbid obesity


Code(s): E66.01 - MORBID (SEVERE) OBESITY DUE TO EXCESS CALORIES   Status: 

Chronic   





(7) Obesity hypoventilation syndrome


Code(s): E66.2 - MORBID (SEVERE) OBESITY WITH ALVEOLAR HYPOVENTILATION   Status

: Suspected   Comment: O2 via NC continuously   





- Plan


plan discussed w/ family, PT/OT, , respiratory therapy, out of 

bed/ambulate


Stable overall


-: Continue pulmonary supportive measures


-: PT for mobilization


-: WCT for local care to E


-: Hold all BP meds and continue to monitor trend





* Transfer to Telemetry


* Likely back to SNF([facility]) in 24-48h

## 2018-03-23 NOTE — PRG
DATE OF SERVICE:  03/23/2018

 

SERVICE:  Pulmonary Medicine.

 

INTERVAL HISTORY:  The patient is doing great from a respiratory standpoint.  He is breathing comfort
ably.  He denies any chest pain or shortness of breath.  He remains fairly debilitated.  Otherwise, t
here has been no change to his condition.

 

PHYSICAL EXAMINATION:

VITAL SIGNS:  Afebrile, pulse 66, blood pressure 124/52, respirations 22, saturation 96% on 3 liters 
nasal cannula.

GENERAL:  The patient is awake, alert, no apparent distress.

LUNGS:  Decent air entry.  Rhonchi are present.  Dependent crackles are also there.

HEART:  Normal rate, regular.

ABDOMEN:  Soft, nontender, and nondistended.  Bowel sounds are positive.

MUSCULOSKELETAL:  No cyanosis or clubbing.  There is trace pitting in the bilateral lower extremities
.

NEUROLOGIC:  Grossly nonfocal.

 

LABORATORY DATA:  WBC 3.9, hemoglobin 9.5, and platelets 168,000.  A pH 7.34, pCO2 66, and pO2 62.  B
UN 27, creatinine 0.85, bicarbonate 36.  Basic metabolic profile is otherwise unremarkable.  Microbio
logy remains unremarkable.

 

ASSESSMENT:

1.  Acute on chronic hypoxic and hypercapnic respiratory failure, currently at baseline.

2.  Chronic diastolic heart failure, near euvolemia.

3.  Acute blood loss anemia following recent upper gastrointestinal bleed, stable.

4.  Osteoarthritis of the bilateral hips, severe.

5.  Debility, severe.

6.  Obesity hypoventilation syndrome.

 

PLAN:  The patient has essentially returned back to his usual state of health.  He needs to continue 
to pursue aggressive physical therapy in the outpatient setting.  From a purely respiratory perspecti
ve, he is back to baseline, but he remains at extraordinarily high risk for bouncing back to the hosp
ital.  Pulmonary or Critical Care will continue to follow while he remains in the hospital for the ti
me being.  It is certainly be reasonable to keep him through the weekend to make certain that he will
 not have an abrupt decompensation once again when he gets further away from noninvasive ventilation.
  We have offered to set this thing up out of the hospital, but he is declining as he does not like w
earing the noninvasive ventilator.

## 2018-03-24 RX ADMIN — Medication SCH ML: at 20:47

## 2018-03-24 RX ADMIN — ASPIRIN SCH MG: 81 TABLET ORAL at 08:18

## 2018-03-24 RX ADMIN — Medication SCH ML: at 08:19

## 2018-03-24 NOTE — PRG
DATE OF SERVICE:  03/24/2018

 

SUBJECTIVE:  The patient is doing reasonably well.

 

PHYSICAL EXAMINATION:

VITAL SIGNS:  His temperature is 98.3, pulse is 61, respiration 24, O2 sat 95% on 3 liters, blood pre
ssure 135/62.

HEENT:  Unremarkable.

NECK:  No JVD.

CHEST:  Clear.

CARDIAC:  S1 and S2 regular.

ABDOMEN:  Soft.

EXTREMITIES:  Brawny edema throughout.

 

LABORATORY DATA:  No labs were done today.

 

ASSESSMENT:

1.  Chronic obstructive pulmonary disease with exacerbation.

2.  Acute on chronic hypercapnic respiratory failure.

3.  Chronic diastolic heart failure.

4.  Obesity hypoventilation syndrome.

 

PLAN:  The patient is being transferred to telemetry.  Eventually, he will go back to the nursing noam
e.  He is continuing diuresis.  Prognosis is quite poor.

## 2018-03-24 NOTE — PDOC.PN
- Subjective


Encounter Start Date: 03/24/18


Encounter Start Time: 17:20


Subjective: reports that he feels better.


-: agreeable to try CPAP/Bipap tonight





- Objective


Resuscitation Status: 


 











Resuscitation Status           DNR:Do Not Resuscitate














MAR Reviewed: Yes


Vital Signs & Weight: 


 Vital Signs (12 hours)











  Temp Pulse Pulse Pulse Resp BP BP


 


 03/24/18 15:45   60    16  


 


 03/24/18 15:00  98.6 F  62    22 H  


 


 03/24/18 12:00  98.1 F  62    20  


 


 03/24/18 10:16    61  60   154/61 H  140/60


 


 03/24/18 08:00  98.3 F  61    24 H  


 


 03/24/18 07:00  98.3 F  61    24 H  


 


 03/24/18 06:39       


 


 03/24/18 06:37   62    16  














  BP Pulse Ox Pulse Ox Pulse Ox


 


 03/24/18 15:45    


 


 03/24/18 15:00  131/57 L  99  


 


 03/24/18 12:00  154/61 H  97  


 


 03/24/18 10:16    95  96


 


 03/24/18 08:00   95  


 


 03/24/18 07:00  135/62  95  


 


 03/24/18 06:39   95  


 


 03/24/18 06:37    








 Weight











Admit Weight                   262 lb


 


Weight                         260 lb 1 oz














I&O: 


 











 03/23/18 03/24/18 03/25/18





 06:59 06:59 06:59


 


Intake Total 1410 2330 


 


Output Total 1300 2025 


 


Balance 110 305 











Result Diagrams: 


 03/23/18 03:33





 03/23/18 03:33


Additional Labs: 





Microbiology





03/21/18 18:50   Urine granados catheter   Urine Culture - Final


                              NO GROWTH AT 48 HOURS


03/21/18 09:43   Venous blood - Right Hand   Blood Culture - Preliminary


                              NO GROWTH AT 48 HOURS


03/21/18 09:43   Venous blood - Left Hand   Blood Culture - Preliminary


                              NO GROWTH AT 48 HOURS





 Laboratory Tests











  02/26/18 02/27/18 03/15/18





  09:20 19:09 15:45


 


AST  49 H  26  17


 


ALT   


 


Alkaline Phosphatase   


 


TSH 3rd Generation   














  03/21/18 03/21/18 03/22/18





  09:46 15:47 04:49


 


AST  22   15


 


ALT  58 H   37


 


Alkaline Phosphatase  179 H   132


 


TSH 3rd Generation   0.3628 














Phys Exam





- Physical Examination


Constitutional: NAD


pale.weak.


HEENT: PERRLA, moist MMs, sclera anicteric, oral pharynx no lesions


Neck: no JVD


coarse breath sounds b/l.


Cardiovascular: RRR, no significant murmur


Gastrointestinal: soft, non-tender, no distention, positive bowel sounds


Musculoskeletal: pulses present, edema present


Neurological: non-focal, normal sensation, moves all 4 limbs


Psychiatric: normal affect, A&O x 3


Skin: no rash





Dx/Plan


(1) Acute and chronic respiratory failure


Code(s): J96.20 - ACUTE AND CHR RESP FAILURE, UNSP W HYPOXIA OR HYPERCAPNIA   

Status: Acute   


Qualifiers: 


   Respiratory failure complication: hypoxia and hypercapnia   Qualified Code(s)

: J96.21 - Acute and chronic respiratory failure with hypoxia; J96.22 - Acute 

and chronic respiratory failure with hypercapnia; J96.22 - Acute and chronic 

respiratory failure with hypercapnia; J96.22 - Acute and chronic respiratory 

failure with hypercapnia   


Comment: Improved and off BiPAP, continue O2 via NC, general pulmonary support 

  





(2) Encephalopathy acute


Code(s): G93.40 - ENCEPHALOPATHY, UNSPECIFIED   Status: Acute   Comment: Likely 

metabolic and multifactorial with hypoxia and hypotension, resolved   





(3) Hypotension


Status: Resolved   Comment: Suspect iatrogenic, over diuresed, encourage 

increase free-H2O intake, hold home BP regimen, resolving   





(4) Severe muscle deconditioning


Code(s): R29.898 - OTH SYMPTOMS AND SIGNS INVOLVING THE MUSCULOSKELETAL SYSTEM 

  Status: Chronic   Comment: PT/OT for mobilization, OOB as tolerated with 

assistance   





(5) Sick sinus syndrome


Code(s): I49.5 - SICK SINUS SYNDROME   Status: Acute   Comment: s/p pacemaker   





(6) Diastolic CHF


Code(s): I50.30 - UNSPECIFIED DIASTOLIC (CONGESTIVE) HEART FAILURE   Status: 

Chronic   


Qualifiers: 


   Heart failure chronicity: chronic   Qualified Code(s): I50.32 - Chronic 

diastolic (congestive) heart failure   





(7) HTN (hypertension)


Code(s): I10 - ESSENTIAL (PRIMARY) HYPERTENSION   Status: Chronic   


Qualifiers: 


   Hypertension type: essential hypertension   Qualified Code(s): I10 - 

Essential (primary) hypertension   





(8) Morbid obesity


Code(s): E66.01 - MORBID (SEVERE) OBESITY DUE TO EXCESS CALORIES   Status: 

Chronic   





(9) WILMA (obstructive sleep apnea)


Code(s): G47.33 - OBSTRUCTIVE SLEEP APNEA (ADULT) (PEDIATRIC)   Status: Chronic

   





(10) Osteoarthritis


Code(s): M19.90 - UNSPECIFIED OSTEOARTHRITIS, UNSPECIFIED SITE   Status: 

Chronic   


Qualifiers: 


   Osteoarthritis location: hip   Osteoarthritis type: primary   Laterality: 

bilateral   Qualified Code(s): M16.0 - Bilateral primary osteoarthritis of hip 

  





(11) Obesity hypoventilation syndrome


Code(s): E66.2 - MORBID (SEVERE) OBESITY WITH ALVEOLAR HYPOVENTILATION   Status

: Suspected   Comment: O2 via NC continuously   





(12) H/O: GI bleed


Code(s): Z87.19 - PERSONAL HISTORY OF OTHER DISEASES OF THE DIGESTIVE SYSTEM   

Status: Acute   Comment: s/p cauterization 2/2018   





- Plan


PT/OT, respiratory therapy, incentive spirometry, out of bed/ambulate, DVT 

proph w/SCDs


avoid Plavix.cont ASA w close monitoring w recent GI bleed.PPI BID


-: restart anti hypertensives gardually w BP monitoring. slow IVF


-: monitor clinically, if stable, DC to NH tomorrow.


-: DNR.PCt following


-: am labs.cont to hold Lasix & ACE-I as BUN rising.monitor





* .








Review of Systems





- Review of Systems


Constitutional: weakness, malaise.  negative: fever, chills, sweats, other


ENT: negative: Ear Pain, Ear Discharge, Nose Pain, Nose Discharge, Nose 

Congestion, Mouth Pain, Mouth Swelling, Throat Pain, Throat Swelling, Other


Respiratory: Cough, SOB with Excertion.  negative: Dry, Shortness of Breath, 

Hemoptysis, Pleuritic Pain, Sputum, Wheezing


Cardiovascular: negative: chest pain, palpitations, orthopnea, paroxysmal 

nocturnal dyspnea, edema, light headedness, other


Gastrointestinal: negative: Nausea, Vomiting, Abdominal Pain, Diarrhea, 

Constipation, Melena, Hematochezia, Other


Genitourinary: negative: Dysuria, Frequency, Incontinence, Hematuria, Retention

, Other


Musculoskeletal: negative: Neck Pain, Shoulder Pain, Arm Pain, Back Pain, Hand 

Pain, Leg Pain, Foot Pain, Other


Skin: negative: Rash, Lesions, Jason, Bruising, Other


Neurological: negative: Weakness, Numbness, Incoordination, Change in Speech, 

Confusion, Seizures, Other





- Medications/Allergies


Allergies/Adverse Reactions: 


 Allergies











Allergy/AdvReac Type Severity Reaction Status Date / Time


 


No Known Drug Allergies Allergy   Verified 02/26/18 14:53











Medications: 


 Current Medications





Acetaminophen (Tylenol)  650 mg [place] Q4H PRN


   PRN Reason: Headache/Fever or Mild Pain


Albuterol/Ipratropium (Duoneb)  3 ml NEB [id number] [facility]


   Last Admin: 03/24/18 15:45 Dose:  3 ml


Aspirin (Ecotrin)  81 mg PO DAILY [facility]


   Last Admin: 03/24/18 08:18 Dose:  81 mg


Enoxaparin Sodium (Lovenox)  40 mg SC 0900 [facility]


   Last Admin: 03/24/18 08:19 Dose:  40 mg


Furosemide (Lasix)  40 mg PO DAILY-AC [facility]


   Last Admin: 03/24/18 08:18 Dose:  40 mg


Lorazepam (Ativan)  1 mg SLOW IVP Q4H PRN


   PRN Reason: Anxiety/Agitation


Ondansetron HCl (Zofran Odt)  4 mg PO Q6H PRN


   PRN Reason: Nausea/Vomiting


Ondansetron HCl (Zofran)  4 mg IVP Q6H PRN


   PRN Reason: Nausea/Vomiting


Pantoprazole Sodium (Protonix)  40 mg PO BID [facility]


   Last Admin: 03/24/18 08:18 Dose:  40 mg


Sodium Chloride (Flush - Normal Saline)  10 ml IVF Q12HR [facility]


   Last Admin: 03/24/18 08:19 Dose:  10 ml


Sodium Chloride (Flush - Normal Saline)  10 ml IVF PRN PRN


   PRN Reason: Saline Flush

## 2018-03-25 RX ADMIN — Medication SCH ML: at 20:17

## 2018-03-25 RX ADMIN — Medication SCH ML: at 09:49

## 2018-03-25 RX ADMIN — ASPIRIN SCH MG: 81 TABLET ORAL at 09:48

## 2018-03-25 NOTE — PDOC.PN
- Subjective


Encounter Start Date: 03/25/18


Encounter Start Time: 15:25


Subjective: feels better.did not wear Bipap last night again


-: eating good and working w Pt but minimal





- Objective


Resuscitation Status: 


 











Resuscitation Status           DNR:Do Not Resuscitate














MAR Reviewed: Yes


Vital Signs & Weight: 


 Vital Signs (12 hours)











  Temp Pulse Resp BP BP Pulse Ox


 


 03/25/18 13:41   58 L  16   


 


 03/25/18 11:00  98.4 F  60  24 H   129/61  93 L


 


 03/25/18 09:48   61   152/64 H  


 


 03/25/18 08:00  98.6 F  58 L  22 H    95


 


 03/25/18 07:40   61  16    95


 


 03/25/18 07:24  98.6 F  58 L  22 H   152/64 H  94 L


 


 03/25/18 03:35  98.9 F  60  16   129/58 L  93 L








 Weight











Admit Weight                   262 lb


 


Weight                         260 lb 1 oz














I&O: 


 











 03/24/18 03/25/18 03/26/18





 06:59 06:59 06:59


 


Intake Total 2330 2570 


 


Output Total 2025 4900 


 


Balance 305 -2330 











Result Diagrams: 


 03/23/18 03:33





 03/23/18 03:33


Additional Labs: 





Microbiology





03/21/18 18:50   Urine granados catheter   Urine Culture - Final


                              NO GROWTH AT 48 HOURS


03/21/18 09:43   Venous blood - Right Hand   Blood Culture - Preliminary


                              NO GROWTH AT 48 HOURS


03/21/18 09:43   Venous blood - Left Hand   Blood Culture - Preliminary


                              NO GROWTH AT 48 HOURS











Phys Exam





- Physical Examination


Constitutional: NAD


weak,pale,slow 


HEENT: PERRLA, moist MMs, sclera anicteric, oral pharynx no lesions


Neck: no nodes, no JVD, supple, full ROM


reduced at bases.poor effort


Cardiovascular: RRR, no significant murmur


Gastrointestinal: soft, non-tender, no distention, positive bowel sounds


Musculoskeletal: no edema, pulses present


Neurological: non-focal, normal sensation, moves all 4 limbs


Psychiatric: normal affect, A&O x 3


Skin: no rash





Dx/Plan


(1) Acute and chronic respiratory failure


Code(s): J96.20 - ACUTE AND CHR RESP FAILURE, UNSP W HYPOXIA OR HYPERCAPNIA   

Status: Acute   


Qualifiers: 


   Respiratory failure complication: hypoxia and hypercapnia   Qualified Code(s)

: J96.21 - Acute and chronic respiratory failure with hypoxia; J96.22 - Acute 

and chronic respiratory failure with hypercapnia; J96.22 - Acute and chronic 

respiratory failure with hypercapnia; J96.22 - Acute and chronic respiratory 

failure with hypercapnia   


Comment: Improved and off BiPAP, continue O2 via NC, general pulmonary support 

  





(2) Encephalopathy acute


Code(s): G93.40 - ENCEPHALOPATHY, UNSPECIFIED   Status: Resolved   Comment: 

Likely metabolic and multifactorial with hypoxia and hypotension, resolved   





(3) Hypotension


Status: Resolved   Comment: Suspect iatrogenic, over diuresed, encourage 

increase free-H2O intake, hold home BP regimen, resolving   





(4) Severe muscle deconditioning


Code(s): R29.898 - OT SYMPTOMS AND SIGNS INVOLVING THE MUSCULOSKELETAL SYSTEM 

  Status: Chronic   Comment: PT/OT for mobilization, OOB as tolerated with 

assistance   





(5) Sick sinus syndrome


Code(s): I49.5 - SICK SINUS SYNDROME   Status: Acute   Comment: s/p pacemaker   





(6) Diastolic CHF


Code(s): I50.30 - UNSPECIFIED DIASTOLIC (CONGESTIVE) HEART FAILURE   Status: 

Chronic   


Qualifiers: 


   Heart failure chronicity: chronic   Qualified Code(s): I50.32 - Chronic 

diastolic (congestive) heart failure   





(7) HTN (hypertension)


Code(s): I10 - ESSENTIAL (PRIMARY) HYPERTENSION   Status: Chronic   


Qualifiers: 


   Hypertension type: essential hypertension   Qualified Code(s): I10 - 

Essential (primary) hypertension   





(8) Morbid obesity


Code(s): E66.01 - MORBID (SEVERE) OBESITY DUE TO EXCESS CALORIES   Status: 

Chronic   





(9) WILMA (obstructive sleep apnea)


Code(s): G47.33 - OBSTRUCTIVE SLEEP APNEA (ADULT) (PEDIATRIC)   Status: Chronic

   





(10) Osteoarthritis


Code(s): M19.90 - UNSPECIFIED OSTEOARTHRITIS, UNSPECIFIED SITE   Status: 

Chronic   


Qualifiers: 


   Osteoarthritis location: hip   Osteoarthritis type: primary   Laterality: 

bilateral   Qualified Code(s): M16.0 - Bilateral primary osteoarthritis of hip 

  





(11) Obesity hypoventilation syndrome


Code(s): E66.2 - MORBID (SEVERE) OBESITY WITH ALVEOLAR HYPOVENTILATION   Status

: Suspected   Comment: O2 via NC continuously   





(12) H/O: GI bleed


Code(s): Z87.19 - PERSONAL HISTORY OF OTHER DISEASES OF THE DIGESTIVE SYSTEM   

Status: Acute   Comment: s/p cauterization 2/2018   





- Plan


PT/OT, , respiratory therapy, incentive spirometry, out of bed/

ambulate, DVT proph w/SCDs


restart Lisinopril at lower dose w BP monitoring.


-: plavix on hold for recent GI bleed.


-: cont rest of the home meds as below


-: cont lasix,coreg,norvasc.cont statin.PPI BID


-: am labs.back to [facility] in am if Ok w PCCM





* .








Review of Systems





- Review of Systems


Constitutional: weakness, malaise


Respiratory: SOB with Excertion, Sputum.  negative: Cough, Dry, Shortness of 

Breath, Hemoptysis, Pleuritic Pain, Wheezing


Cardiovascular: negative: chest pain, palpitations, orthopnea, paroxysmal 

nocturnal dyspnea, edema, light headedness, other


Gastrointestinal: negative: Nausea, Vomiting, Abdominal Pain, Diarrhea, 

Constipation, Melena, Hematochezia, Other


Genitourinary: negative: Dysuria, Frequency, Incontinence, Hematuria, Retention

, Other


Musculoskeletal: negative: Neck Pain, Shoulder Pain, Arm Pain, Back Pain, Hand 

Pain, Leg Pain, Foot Pain, Other


Skin: negative: Rash, Lesions, Jason, Bruising, Other


Neurological: negative: Weakness, Numbness, Incoordination, Change in Speech, 

Confusion, Seizures, Other





- Medications/Allergies


Allergies/Adverse Reactions: 


 Allergies











Allergy/AdvReac Type Severity Reaction Status Date / Time


 


No Known Drug Allergies Allergy   Verified 02/26/18 14:53











Medications: 


 Current Medications





Acetaminophen (Tylenol)  650 mg [place] Q4H PRN


   PRN Reason: Headache/Fever or Mild Pain


Albuterol/Ipratropium (Duoneb)  3 ml NEB [id number] Replaced by [facility]


   Last Admin: 03/25/18 13:41 Dose:  3 ml


Amlodipine Besylate (Norvasc)  5 mg PO DAILY Replaced by [facility]


   Last Admin: 03/25/18 09:48 Dose:  5 mg


Aspirin (Ecotrin)  81 mg PO DAILY Replaced by [facility]


   Last Admin: 03/25/18 09:48 Dose:  81 mg


Carvedilol (Coreg)  6.25 mg PO BID Replaced by [facility]


   Last Admin: 03/25/18 09:48 Dose:  6.25 mg


Dutasteride (Avodart)  0.5 mg PO DAILY Replaced by [facility]


   Last Admin: 03/25/18 09:48 Dose:  0.5 mg


Enoxaparin Sodium (Lovenox)  40 mg SC 0900 Replaced by [facility]


   Last Admin: 03/25/18 09:49 Dose:  40 mg


Furosemide (Lasix)  40 mg PO DAILY-AC Replaced by [facility]


   Last Admin: 03/25/18 09:48 Dose:  40 mg


Lisinopril (Zestril)  10 mg PO HS Replaced by [facility]


Lorazepam (Ativan)  1 mg SLOW IVP Q4H PRN


   PRN Reason: Anxiety/Agitation


   Last Admin: 03/25/18 09:50 Dose:  1 mg


Ondansetron HCl (Zofran Odt)  4 mg PO Q6H PRN


   PRN Reason: Nausea/Vomiting


Ondansetron HCl (Zofran)  4 mg IVP Q6H PRN


   PRN Reason: Nausea/Vomiting


Pantoprazole Sodium (Protonix)  40 mg PO BID Replaced by [facility]


   Last Admin: 03/25/18 09:48 Dose:  40 mg


Simvastatin (Zocor)  20 mg PO DAILY Replaced by [facility]


   Last Admin: 03/25/18 09:48 Dose:  20 mg


Sodium Chloride (Flush - Normal Saline)  10 ml IVF Q12HR Replaced by [facility]


   Last Admin: 03/25/18 09:49 Dose:  10 ml


Sodium Chloride (Flush - Normal Saline)  10 ml IVF PRN PRN


   PRN Reason: Saline Flush


Tamsulosin HCl (Flomax)  0.4 mg PO DAILY Replaced by [facility]


   Last Admin: 03/25/18 09:48 Dose:  0.4 mg

## 2018-03-25 NOTE — PRG
DATE OF SERVICE:  03/25/2018.

 

SUBJECTIVE:  The patient is less talkative than yesterday.  He is wanting to sleep.

 

OBJECTIVE:

VITAL SIGNS:  Temperature 98.6, pulse 61, blood pressure 150/64, O2 saturations 95% on 3 liters.

HEENT:  Unremarkable.

NECK:  No JVD.

LUNGS:  Diminished, but clear breath sounds.

CARDIAC:  S1 and S2 regular.

ABDOMEN:  Soft.

EXTREMITIES:  No edema.

 

ASSESSMENT:

1.  Obesity hypoventilation syndrome.

2.  Chronic obstructive pulmonary disease.

3.  Acute on chronic hypercapnic respiratory failure.

4.  Chronic diastolic heart failure.

 

PLAN:  I have instructed the patient to sleep with the BiPAP at night and during his naps.  Continue 
all other medical therapy.  He is not a good candidate for rehabilitation because I doubt he can do a
ny type of therapy.

## 2018-03-26 ENCOUNTER — HOSPITAL ENCOUNTER (INPATIENT)
Dept: HOSPITAL 92 - ERS | Age: 73
LOS: 8 days | Discharge: NURSING, REHAB OR LONG-TERM CARE | DRG: 189 | End: 2018-04-03
Attending: HOSPITALIST | Admitting: HOSPITALIST
Payer: MEDICARE

## 2018-03-26 VITALS — TEMPERATURE: 98.1 F | SYSTOLIC BLOOD PRESSURE: 132 MMHG | DIASTOLIC BLOOD PRESSURE: 54 MMHG

## 2018-03-26 VITALS — BODY MASS INDEX: 40.7 KG/M2

## 2018-03-26 DIAGNOSIS — I50.32: ICD-10-CM

## 2018-03-26 DIAGNOSIS — R53.81: ICD-10-CM

## 2018-03-26 DIAGNOSIS — Z79.01: ICD-10-CM

## 2018-03-26 DIAGNOSIS — E66.2: ICD-10-CM

## 2018-03-26 DIAGNOSIS — F17.210: ICD-10-CM

## 2018-03-26 DIAGNOSIS — I48.92: ICD-10-CM

## 2018-03-26 DIAGNOSIS — I42.9: ICD-10-CM

## 2018-03-26 DIAGNOSIS — I25.10: ICD-10-CM

## 2018-03-26 DIAGNOSIS — Z95.0: ICD-10-CM

## 2018-03-26 DIAGNOSIS — I11.0: ICD-10-CM

## 2018-03-26 DIAGNOSIS — M16.0: ICD-10-CM

## 2018-03-26 DIAGNOSIS — G93.41: ICD-10-CM

## 2018-03-26 DIAGNOSIS — E11.9: ICD-10-CM

## 2018-03-26 DIAGNOSIS — J96.21: Primary | ICD-10-CM

## 2018-03-26 DIAGNOSIS — N40.0: ICD-10-CM

## 2018-03-26 DIAGNOSIS — E87.6: ICD-10-CM

## 2018-03-26 DIAGNOSIS — K27.9: ICD-10-CM

## 2018-03-26 DIAGNOSIS — J44.1: ICD-10-CM

## 2018-03-26 DIAGNOSIS — Z87.19: ICD-10-CM

## 2018-03-26 DIAGNOSIS — J96.22: ICD-10-CM

## 2018-03-26 DIAGNOSIS — E78.5: ICD-10-CM

## 2018-03-26 DIAGNOSIS — G47.33: ICD-10-CM

## 2018-03-26 DIAGNOSIS — D62: ICD-10-CM

## 2018-03-26 DIAGNOSIS — Z66: ICD-10-CM

## 2018-03-26 LAB
ALBUMIN SERPL BCG-MCNC: 3.1 G/DL (ref 3.4–4.8)
ALP SERPL-CCNC: 87 U/L (ref 40–150)
ALT SERPL W P-5'-P-CCNC: 22 U/L (ref 8–55)
ANION GAP SERPL CALC-SCNC: 12 MMOL/L (ref 10–20)
ANION GAP SERPL CALC-SCNC: 14 MMOL/L (ref 10–20)
AST SERPL-CCNC: 17 U/L (ref 5–34)
BASOPHILS # BLD AUTO: 0 THOU/UL (ref 0–0.2)
BASOPHILS # BLD AUTO: 0 THOU/UL (ref 0–0.2)
BASOPHILS NFR BLD AUTO: 0.3 % (ref 0–1)
BASOPHILS NFR BLD AUTO: 0.8 % (ref 0–1)
BILIRUB SERPL-MCNC: 0.5 MG/DL (ref 0.2–1.2)
BUN SERPL-MCNC: 15 MG/DL (ref 8.4–25.7)
BUN SERPL-MCNC: 17 MG/DL (ref 8.4–25.7)
CALCIUM SERPL-MCNC: 8.2 MG/DL (ref 7.8–10.44)
CALCIUM SERPL-MCNC: 8.4 MG/DL (ref 7.8–10.44)
CHLORIDE SERPL-SCNC: 93 MMOL/L (ref 98–107)
CHLORIDE SERPL-SCNC: 95 MMOL/L (ref 98–107)
CK MB SERPL-MCNC: 0.9 NG/ML (ref 0–6.6)
CK SERPL-CCNC: 20 U/L (ref 30–200)
CO2 SERPL-SCNC: 35 MMOL/L (ref 23–31)
CO2 SERPL-SCNC: 37 MMOL/L (ref 23–31)
CREAT CL PREDICTED SERPL C-G-VRATE: 0 ML/MIN (ref 70–130)
CREAT CL PREDICTED SERPL C-G-VRATE: 155 ML/MIN (ref 70–130)
EOSINOPHIL # BLD AUTO: 0.4 THOU/UL (ref 0–0.7)
EOSINOPHIL # BLD AUTO: 0.4 THOU/UL (ref 0–0.7)
EOSINOPHIL NFR BLD AUTO: 6.6 % (ref 0–10)
EOSINOPHIL NFR BLD AUTO: 8.8 % (ref 0–10)
GLOBULIN SER CALC-MCNC: 2.7 G/DL (ref 2.4–3.5)
GLUCOSE SERPL-MCNC: 105 MG/DL (ref 80–115)
GLUCOSE SERPL-MCNC: 147 MG/DL (ref 80–115)
HGB BLD-MCNC: 10.6 G/DL (ref 14–18)
HGB BLD-MCNC: 11.6 G/DL (ref 14–18)
IRON SERPL-MCNC: 29 UG/DL (ref 65–175)
LYMPHOCYTES # BLD: 0.6 THOU/UL (ref 1.2–3.4)
LYMPHOCYTES # BLD: 0.6 THOU/UL (ref 1.2–3.4)
LYMPHOCYTES NFR BLD AUTO: 13.4 % (ref 21–51)
LYMPHOCYTES NFR BLD AUTO: 9 % (ref 21–51)
MCH RBC QN AUTO: 30.4 PG (ref 27–31)
MCH RBC QN AUTO: 30.8 PG (ref 27–31)
MCV RBC AUTO: 94.7 FL (ref 80–94)
MCV RBC AUTO: 95.2 FL (ref 80–94)
MONOCYTES # BLD AUTO: 0.4 THOU/UL (ref 0.11–0.59)
MONOCYTES # BLD AUTO: 0.4 THOU/UL (ref 0.11–0.59)
MONOCYTES NFR BLD AUTO: 6.6 % (ref 0–10)
MONOCYTES NFR BLD AUTO: 9.8 % (ref 0–10)
NEUTROPHILS # BLD AUTO: 2.8 THOU/UL (ref 1.4–6.5)
NEUTROPHILS # BLD AUTO: 4.9 THOU/UL (ref 1.4–6.5)
NEUTROPHILS NFR BLD AUTO: 67.3 % (ref 42–75)
NEUTROPHILS NFR BLD AUTO: 77.5 % (ref 42–75)
PLATELET # BLD AUTO: 143 THOU/UL (ref 130–400)
PLATELET # BLD AUTO: 172 THOU/UL (ref 130–400)
POTASSIUM SERPL-SCNC: 4.2 MMOL/L (ref 3.5–5.1)
POTASSIUM SERPL-SCNC: 4.3 MMOL/L (ref 3.5–5.1)
RBC # BLD AUTO: 3.43 MILL/UL (ref 4.7–6.1)
RBC # BLD AUTO: 3.81 MILL/UL (ref 4.7–6.1)
SODIUM SERPL-SCNC: 138 MMOL/L (ref 136–145)
SODIUM SERPL-SCNC: 140 MMOL/L (ref 136–145)
TROPONIN I SERPL DL<=0.01 NG/ML-MCNC: 0.02 NG/ML (ref ?–0.03)
UIBC SERPL-MCNC: 179 MCG/DL (ref 261–462)
WBC # BLD AUTO: 4.2 THOU/UL (ref 4.8–10.8)
WBC # BLD AUTO: 6.3 THOU/UL (ref 4.8–10.8)

## 2018-03-26 PROCEDURE — 94660 CPAP INITIATION&MGMT: CPT

## 2018-03-26 PROCEDURE — 5A09357 ASSISTANCE WITH RESPIRATORY VENTILATION, LESS THAN 24 CONSECUTIVE HOURS, CONTINUOUS POSITIVE AIRWAY PRESSURE: ICD-10-PCS | Performed by: INTERNAL MEDICINE

## 2018-03-26 PROCEDURE — 83735 ASSAY OF MAGNESIUM: CPT

## 2018-03-26 PROCEDURE — 85025 COMPLETE CBC W/AUTO DIFF WBC: CPT

## 2018-03-26 PROCEDURE — 81015 MICROSCOPIC EXAM OF URINE: CPT

## 2018-03-26 PROCEDURE — S0028 INJECTION, FAMOTIDINE, 20 MG: HCPCS

## 2018-03-26 PROCEDURE — 81003 URINALYSIS AUTO W/O SCOPE: CPT

## 2018-03-26 PROCEDURE — 80048 BASIC METABOLIC PNL TOTAL CA: CPT

## 2018-03-26 PROCEDURE — 51702 INSERT TEMP BLADDER CATH: CPT

## 2018-03-26 PROCEDURE — 87040 BLOOD CULTURE FOR BACTERIA: CPT

## 2018-03-26 PROCEDURE — 84484 ASSAY OF TROPONIN QUANT: CPT

## 2018-03-26 PROCEDURE — 93306 TTE W/DOPPLER COMPLETE: CPT

## 2018-03-26 PROCEDURE — 36415 COLL VENOUS BLD VENIPUNCTURE: CPT

## 2018-03-26 PROCEDURE — 84100 ASSAY OF PHOSPHORUS: CPT

## 2018-03-26 PROCEDURE — 80202 ASSAY OF VANCOMYCIN: CPT

## 2018-03-26 PROCEDURE — 96374 THER/PROPH/DIAG INJ IV PUSH: CPT

## 2018-03-26 PROCEDURE — 82550 ASSAY OF CK (CPK): CPT

## 2018-03-26 PROCEDURE — 93005 ELECTROCARDIOGRAM TRACING: CPT

## 2018-03-26 PROCEDURE — 83605 ASSAY OF LACTIC ACID: CPT

## 2018-03-26 PROCEDURE — 94640 AIRWAY INHALATION TREATMENT: CPT

## 2018-03-26 PROCEDURE — 71045 X-RAY EXAM CHEST 1 VIEW: CPT

## 2018-03-26 PROCEDURE — 83880 ASSAY OF NATRIURETIC PEPTIDE: CPT

## 2018-03-26 PROCEDURE — 87086 URINE CULTURE/COLONY COUNT: CPT

## 2018-03-26 PROCEDURE — 82553 CREATINE MB FRACTION: CPT

## 2018-03-26 RX ADMIN — Medication SCH ML: at 08:06

## 2018-03-26 RX ADMIN — ASPIRIN SCH MG: 81 TABLET ORAL at 08:07

## 2018-03-26 NOTE — RAD
SINGLE VIEW OF THE CHEST:

 

Comparison: 3-21-18

 

History: COPD. Dyspnea. 

 

FINDINGS: 

Single view of the chest shows a cardiomediastinal silhouette which is upper limits of normal in size
. The pacemaker is unchanged in position. There is stable opacity in the right lung base which may re
present a pleural effusion and adjacent atelectasis versus infiltrate. 

 

IMPRESSION: 

Stable exam. 

 

POS: [facility]

## 2018-03-26 NOTE — PRG
DATE OF SERVICE:  03/26/2018

 

SERVICE:  Pulmonary Medicine.

 

INTERVAL HISTORY:  The patient is doing fine from a respiratory standpoint.  His breathing is at base
line.  He denies any chest pain, fevers, chills, nausea or vomiting.  His appetite is good.  He is wo
rking with physical therapy to the best of his ability.  He is only able to sit up on the side of the
 bed with significant assistance at this point.

 

PHYSICAL EXAMINATION:

VITAL SIGNS:  Afebrile, pulse 62, blood pressure 153/67, respirations 20, saturation 94% on 3 liters 
nasal cannula.

GENERAL:  Patient is awake, alert, no apparent distress.

LUNGS:  Decent air entry.  There is no prolonged expiratory phase, though wheezing and rhonchi are mandy
th present.  Rhonchi cleared with cough.  No crackles.

HEART:  Normal rate, regular.

ABDOMEN:  Soft, nontender, and nondistended.  Bowel sounds are positive.

MUSCULOSKELETAL:  No cyanosis or clubbing.  There is trace pitting in the bilateral lower extremities
.

NEUROLOGIC:  Grossly nonfocal.

 

LABORATORY DATA:  WBC 4.2, hemoglobin 10.6, platelets 143,000.  Basic metabolic profile is completely
 unremarkable.  Bicarbonate is 37.  Blood cultures x2 and urine culture are both unremarkable.

 

ASSESSMENT:

1.  Acute on chronic hypoxic and hypercapnic respiratory failure, resolved to baseline.

2.  Chronic diastolic heart failure, near euvolemia.

3.  Acute blood loss anemia following recent upper gastrointestinal bleed, improving.

4.  Osteoarthritis of the bilateral hips, severe.

5.  Debility, severe.

6.  Obesity hypoventilation syndrome, slowly resolving.

 

PLAN:  The patient is really doing quite well.  I am going to try him on a nasal mask today to see wh
ether or not this is more comfortable.  I will provide him with multivitamin on a daily basis as the 
patient does not have a lot of access to sunlight.  Pulmonary and Critical Care will continue to foll
ow while the patient remains in this location, but from my perspective, he has returned to baseline a
nd can be considered for transition back to his skilled nursing facility to pursue aggressive rehabil
itation if possible.

## 2018-03-26 NOTE — PDOC.PN
- Subjective


Encounter Start Date: 03/26/18


Encounter Start Time: 14:59


Subjective: feels well.no new complainst. weakness as before but eating good


-: no CP/SOB/AP/N/V/D





- Objective


Resuscitation Status: 


 











Resuscitation Status           DNR:Do Not Resuscitate














MAR Reviewed: Yes


Vital Signs & Weight: 


 Vital Signs (12 hours)











  Temp Pulse Pulse Pulse Resp BP BP


 


 03/26/18 13:16   66    19  


 


 03/26/18 11:00  98.1 F  61    22 H  


 


 03/26/18 09:28    63  68    151/59 H


 


 03/26/18 08:07   62     153/67 H 


 


 03/26/18 08:00  98.8 F  69    24 H  


 


 03/26/18 07:13       


 


 03/26/18 07:11   62    20  


 


 03/26/18 03:57  98 F  60    17  














  BP BP Pulse Ox Pulse Ox Pulse Ox


 


 03/26/18 13:16    95  


 


 03/26/18 11:00   132/54 L  94 L  


 


 03/26/18 09:28  150/68 H    96  90 L


 


 03/26/18 08:07     


 


 03/26/18 08:00   153/67 H  89 L  


 


 03/26/18 07:13    94 L  


 


 03/26/18 07:11    94 L  


 


 03/26/18 03:57   128/60  94 L  








 Weight











Admit Weight                   262 lb


 


Weight                         261 lb 3.2 oz














I&O: 


 











 03/25/18 03/26/18 03/27/18





 06:59 06:59 06:59


 


Intake Total 2570 1021 400


 


Output Total 4900 2875 


 


Balance -2330 -1854 400











Result Diagrams: 


 03/26/18 04:23





 03/26/18 04:23


Additional Labs: 





Microbiology





03/21/18 18:50   Urine granados catheter   Urine Culture - Final


                              NO GROWTH AT 48 HOURS


03/21/18 09:43   Venous blood - Right Hand   Blood Culture - Final


                              NO GROWTH IN 5 DAYS


03/15/18 18:20   Venous blood - Right Hand   Blood Culture - Final


                              NO GROWTH IN 5 DAYS


03/15/18 18:20   Venous blood - Left Hand   Blood Culture - Final


                              NO GROWTH IN 5 DAYS


03/15/18 15:55   Urine granados catheter   Urine Culture - Final


                              NO GROWTH AT 36 HOURS


03/04/18 18:25   Urine clean catch   Urine Culture - Final


                              NO GROWTH AT 36 HOURS


03/03/18 12:30   Urine clean catch   Urine Culture - Final


                              NO GROWTH AT 48 HOURS


03/21/18 09:43   Venous blood - Left Hand   Blood Culture - Preliminary


                              NO GROWTH AT 48 HOURS











Phys Exam





- Physical Examination


Constitutional: NAD


HEENT: PERRLA, moist MMs, sclera anicteric, oral pharynx no lesions


Neck: no nodes, no JVD, supple, full ROM


Respiratory: no wheezing, no rales, no rhonchi, clear to auscultation bilateral


Cardiovascular: RRR, no significant murmur


Gastrointestinal: soft, non-tender, no distention, positive bowel sounds


Musculoskeletal: no edema, pulses present


left leg laceration under new dressing


Neurological: non-focal, normal sensation, moves all 4 limbs


Psychiatric: normal affect, A&O x 3


Skin: no rash





Dx/Plan


(1) Acute and chronic respiratory failure


Code(s): J96.20 - ACUTE AND CHR RESP FAILURE, UNSP W HYPOXIA OR HYPERCAPNIA   

Status: Resolved   


Qualifiers: 


   Respiratory failure complication: hypoxia and hypercapnia   Qualified Code(s)

: J96.21 - Acute and chronic respiratory failure with hypoxia; J96.22 - Acute 

and chronic respiratory failure with hypercapnia; J96.22 - Acute and chronic 

respiratory failure with hypercapnia; J96.22 - Acute and chronic respiratory 

failure with hypercapnia   


Comment: Improved and off BiPAP, continue O2 via NC, general pulmonary support 

  





(2) Encephalopathy acute


Code(s): G93.40 - ENCEPHALOPATHY, UNSPECIFIED   Status: Resolved   Comment: 

Likely metabolic and multifactorial with hypoxia and hypotension, resolved   





(3) Hypotension


Status: Resolved   Comment: Suspect iatrogenic, over diuresed, encourage 

increase free-H2O intake, hold home BP regimen, resolving   





(4) Severe muscle deconditioning


Code(s): R29.898 - OTH SYMPTOMS AND SIGNS INVOLVING THE MUSCULOSKELETAL SYSTEM 

  Status: Chronic   Comment: PT/OT for mobilization, OOB as tolerated with 

assistance   





(5) Sick sinus syndrome


Code(s): I49.5 - SICK SINUS SYNDROME   Status: Acute   Comment: s/p pacemaker   





(6) Diastolic CHF


Code(s): I50.30 - UNSPECIFIED DIASTOLIC (CONGESTIVE) HEART FAILURE   Status: 

Chronic   


Qualifiers: 


   Heart failure chronicity: chronic   Qualified Code(s): I50.32 - Chronic 

diastolic (congestive) heart failure   





(7) HTN (hypertension)


Code(s): I10 - ESSENTIAL (PRIMARY) HYPERTENSION   Status: Chronic   


Qualifiers: 


   Hypertension type: essential hypertension   Qualified Code(s): I10 - 

Essential (primary) hypertension   





(8) Morbid obesity


Code(s): E66.01 - MORBID (SEVERE) OBESITY DUE TO EXCESS CALORIES   Status: 

Chronic   





(9) WILMA (obstructive sleep apnea)


Code(s): G47.33 - OBSTRUCTIVE SLEEP APNEA (ADULT) (PEDIATRIC)   Status: Chronic

   





(10) Osteoarthritis


Code(s): M19.90 - UNSPECIFIED OSTEOARTHRITIS, UNSPECIFIED SITE   Status: 

Chronic   


Qualifiers: 


   Osteoarthritis location: hip   Osteoarthritis type: primary   Laterality: 

bilateral   Qualified Code(s): M16.0 - Bilateral primary osteoarthritis of hip 

  





(11) Obesity hypoventilation syndrome


Code(s): E66.2 - MORBID (SEVERE) OBESITY WITH ALVEOLAR HYPOVENTILATION   Status

: Suspected   Comment: O2 via NC continuously   





(12) H/O: GI bleed


Code(s): Z87.19 - PERSONAL HISTORY OF OTHER DISEASES OF THE DIGESTIVE SYSTEM   

Status: Acute   Comment: s/p cauterization 2/2018   





(13) DEVAN (iron deficiency anemia)


Code(s): D50.9 - IRON DEFICIENCY ANEMIA, UNSPECIFIED   Status: Acute   





(14) CAD (coronary artery disease)


Code(s): I25.10 - ATHSCL HEART DISEASE OF NATIVE CORONARY ARTERY W/O ANG PCTRS 

  Status: Chronic   Comment: Cardiologist is Keon Fried   





- Plan


PT/OT, respiratory therapy, incentive spirometry, DVT proph w/SCDs


After thorough & discusiion w GI,will not restart Plavix


-: pt had a GIB w PUD earlier this month w cauterization.tolerating ASA 81mg


-: cont PPI BIF indefinately.add FeSo4.


-: Iron levels low.will give IV Iron IVP


-: OK to DC back to NH.will need aggressive rehab





* .Recommend OP f/u w [facility] cardiologist to further discuss Plavix.Unknown nature 

and duration of stents


* High risk of readmission due to acutiy of illness and varous co morbidities. 


* OOH DNR signed


* 








Review of Systems





- Review of Systems


Constitutional: weakness, malaise


ENT: negative: Ear Pain, Ear Discharge, Nose Pain, Nose Discharge, Nose 

Congestion, Mouth Pain, Mouth Swelling, Throat Pain, Throat Swelling, Other


Respiratory: negative: Cough, Dry, Shortness of Breath, Hemoptysis, SOB with 

Excertion, Pleuritic Pain, Sputum, Wheezing


Cardiovascular: negative: chest pain, palpitations, orthopnea, paroxysmal 

nocturnal dyspnea, edema, light headedness, other


Gastrointestinal: negative: Nausea, Vomiting, Abdominal Pain, Diarrhea, 

Constipation, Melena, Hematochezia, Other


Genitourinary: negative: Dysuria, Frequency, Incontinence, Hematuria, Retention

, Other


Musculoskeletal: negative: Neck Pain, Shoulder Pain, Arm Pain, Back Pain, Hand 

Pain, Leg Pain, Foot Pain, Other


Skin: negative: Rash, Lesions, Jason, Bruising, Other





- Medications/Allergies


Allergies/Adverse Reactions: 


 Allergies











Allergy/AdvReac Type Severity Reaction Status Date / Time


 


No Known Drug Allergies Allergy   Verified 02/26/18 14:53











Medications: 


 Current Medications





Acetaminophen (Tylenol)  650 mg [place] Q4H PRN


   PRN Reason: Headache/Fever or Mild Pain


Albuterol/Ipratropium (Duoneb)  3 ml NEB [id number] [facility]


   Last Admin: 03/26/18 13:16 Dose:  3 ml


Amlodipine Besylate (Norvasc)  5 mg PO DAILY [facility]


   Last Admin: 03/26/18 08:07 Dose:  5 mg


Aspirin (Ecotrin)  81 mg PO DAILY [facility]


   Last Admin: 03/26/18 08:07 Dose:  81 mg


Carvedilol (Coreg)  6.25 mg PO BID [facility]


   Last Admin: 03/26/18 08:07 Dose:  6.25 mg


Dutasteride (Avodart)  0.5 mg PO DAILY [facility]


   Last Admin: 03/26/18 08:07 Dose:  0.5 mg


Enoxaparin Sodium (Lovenox)  40 mg SC 0900 [facility]


   Last Admin: 03/26/18 08:07 Dose:  40 mg


Ferrous Sulfate (Feosol)  325 mg PO BID-[facility]


Furosemide (Lasix)  40 mg PO DAILY-AC [facility]


   Last Admin: 03/26/18 08:06 Dose:  40 mg


Ferric Sodium Gluconate Complex 250 mg/ Miscellaneous Medication 1 each/ Sodium 

Chloride  270 mls @ 135 mls/hr IVPB NOW [facility]


   Stop: 03/26/18 17:00


Iron/Minerals/Multivitamins (Theragran M)  1 tab PO DAILY [facility]


Lisinopril (Zestril)  10 mg PO HS [facility]


   Last Admin: 03/25/18 20:10 Dose:  10 mg


Lorazepam (Ativan)  1 mg SLOW IVP Q4H PRN


   PRN Reason: Anxiety/Agitation


   Last Admin: 03/25/18 23:48 Dose:  1 mg


Ondansetron HCl (Zofran Odt)  4 mg PO Q6H PRN


   PRN Reason: Nausea/Vomiting


Ondansetron HCl (Zofran)  4 mg IVP Q6H PRN


   PRN Reason: Nausea/Vomiting


Pantoprazole Sodium (Protonix)  40 mg PO BID [facility]


   Last Admin: 03/26/18 08:07 Dose:  40 mg


Simvastatin (Zocor)  20 mg PO DAILY [facility]


   Last Admin: 03/26/18 08:07 Dose:  20 mg


Sodium Chloride (Flush - Normal Saline)  10 ml IVF Q12HR [facility]


   Last Admin: 03/26/18 08:06 Dose:  10 ml


Sodium Chloride (Flush - Normal Saline)  10 ml IVF PRN PRN


   PRN Reason: Saline Flush


Tamsulosin HCl (Flomax)  0.4 mg PO DAILY [facility]


   Last Admin: 03/26/18 08:06 Dose:  0.4 mg

## 2018-03-27 LAB
ANION GAP SERPL CALC-SCNC: 13 MMOL/L (ref 10–20)
BASOPHILS # BLD AUTO: 0 THOU/UL (ref 0–0.2)
BASOPHILS NFR BLD AUTO: 0 % (ref 0–1)
BUN SERPL-MCNC: 19 MG/DL (ref 8.4–25.7)
CALCIUM SERPL-MCNC: 8.3 MG/DL (ref 7.8–10.44)
CHLORIDE SERPL-SCNC: 93 MMOL/L (ref 98–107)
CO2 SERPL-SCNC: 36 MMOL/L (ref 23–31)
CREAT CL PREDICTED SERPL C-G-VRATE: 137 ML/MIN (ref 70–130)
CRYSTAL-AUWI FLAG: 0 (ref 0–15)
EOSINOPHIL # BLD AUTO: 0 THOU/UL (ref 0–0.7)
EOSINOPHIL NFR BLD AUTO: 1.2 % (ref 0–10)
GLUCOSE SERPL-MCNC: 172 MG/DL (ref 80–115)
HEV IGM SER QL: 6.9 (ref 0–7.99)
HGB BLD-MCNC: 11.2 G/DL (ref 14–18)
HYALINE CASTS #/AREA URNS LPF: (no result) LPF
LYMPHOCYTES # BLD: 0.3 THOU/UL (ref 1.2–3.4)
LYMPHOCYTES NFR BLD AUTO: 7.9 % (ref 21–51)
MCH RBC QN AUTO: 29.5 PG (ref 27–31)
MCV RBC AUTO: 94.4 FL (ref 80–94)
MONOCYTES # BLD AUTO: 0 THOU/UL (ref 0.11–0.59)
MONOCYTES NFR BLD AUTO: 0.8 % (ref 0–10)
NEUTROPHILS # BLD AUTO: 3.5 THOU/UL (ref 1.4–6.5)
NEUTROPHILS NFR BLD AUTO: 90.1 % (ref 42–75)
PATHC CAST-AUWI FLAG: 0.43 (ref 0–2.49)
PLATELET # BLD AUTO: 159 THOU/UL (ref 130–400)
POTASSIUM SERPL-SCNC: 4.2 MMOL/L (ref 3.5–5.1)
RBC # BLD AUTO: 3.79 MILL/UL (ref 4.7–6.1)
SODIUM SERPL-SCNC: 138 MMOL/L (ref 136–145)
SP GR UR STRIP: 1.02 (ref 1–1.04)
SPERM-AUWI FLAG: 0 (ref 0–9.9)
WBC # BLD AUTO: 3.8 THOU/UL (ref 4.8–10.8)
YEAST-AUWI FLAG: 0 (ref 0–25)

## 2018-03-27 RX ADMIN — ASPIRIN SCH MG: 81 TABLET ORAL at 08:31

## 2018-03-27 RX ADMIN — Medication SCH ML: at 08:32

## 2018-03-27 RX ADMIN — FAMOTIDINE SCH MG: 10 INJECTION, SOLUTION INTRAVENOUS at 21:09

## 2018-03-27 RX ADMIN — MULTIPLE VITAMINS W/ MINERALS TAB SCH TAB: TAB at 08:31

## 2018-03-27 RX ADMIN — Medication SCH ML: at 21:09

## 2018-03-27 RX ADMIN — HYDROCODONE BITARTRATE AND ACETAMINOPHEN PRN TAB: 5; 325 TABLET ORAL at 23:12

## 2018-03-27 RX ADMIN — MOMETASONE FUROATE AND FORMOTEROL FUMARATE DIHYDRATE SCH PUFF: 200; 5 AEROSOL RESPIRATORY (INHALATION) at 19:10

## 2018-03-27 RX ADMIN — FAMOTIDINE SCH MG: 10 INJECTION, SOLUTION INTRAVENOUS at 08:32

## 2018-03-27 NOTE — CON
DATE OF CONSULTATION:  03/27/2018

 

Mr. Herbert was just discharged from the hospital.  Went to [facility], came back 
with increased shortness of breath, unresponsive to usual medication.

 

He is sitting in the bed right now and states first time he felt, he was having 
difficulty breathing.

 

He is denying any chest pain, chills, or sweats.  He is a DNR.  They do not 
have a noninvasive ventilation at [facility].

 

Apparently, the sats at [facility] were in the high 70s.  He apparently has 
declined to wear a BiPAP at [facility].

 

In the ER, he was placed on BiPAP, given steroids, magnesium, and now was 
readmitted.

 

Please review his numerous admissions in the last several weeks.

 

PAST MEDICAL HISTORY:  Pertinent for CHF, COPD, sleep apnea, hypoventilation, 
morbid obesity, diabetes, coronary artery disease.

 

PAST SURGICAL HISTORY:  Stents, pacemaker.

 

MEDICATIONS:  Amlodipine 5, Proscar 5, Avodart 0.5, Coreg 6.25, aspirin, 
lisinopril 10, neb treatment, Lasix 40, guaifenesin, Flomax 0.4.

 

ALLERGIES:  None.

 

SOCIAL/FAMILY HISTORY:  recent note neg.

 

PHYSICAL EXAMINATION:

VITAL SIGNS:  Sats on 3 liters are 85%-86%, blood pressure 130/70, respiratory 
rate 18.

CHEST:  Decreased breath sounds.  No wheezing.

CARDIAC:  Normal S1, S2, no gallops.

ABDOMEN:  Soft.  No masses.

EXTREMITIES:  Reveal chronic stasis changes.

NEUROLOGIC:  He is awake, alert, responsive, moves all 4 extremities.

 

LABORATORY AND X-RAY FINDINGS:  His x-ray shows gross cardiomegaly, elevated 
right hemidiaphragm, atelectatic changes bilaterally.  His creatinine and BUN 
are normal.  White count 3000, H and H are 11 and 35, platelet count 59.

 

IMPRESSION:  Acute on chronic respiratory failure, chronic obstructive 
pulmonary disease, diastolic dysfunction, abnormal x-ray, sleep apnea.

 

PLAN:  Continue present treatment.  I suggested he have noninvasive ventilation 
study at the nursing home.  Notify nursing home his average baseline oxygen 
saturations.  He has no more than 86 on supplemental oxygen.  He is a DNR.

 

Hopefully, this can be arranged prior to his discharge.

 

ALLIE

## 2018-03-27 NOTE — DIS
DATE OF ADMISSION:  03/21/2018

 

DATE OF DISCHARGE:  03/26/2018

 

CONDITION AT THE TIME OF DISCHARGE:  Guarded, but stable.

 

DISCHARGE DIAGNOSES:

1.  Severe sleep apnea requiring bilevel positive airway pressure with noncompliance on part of the p
atient.

2.  Acute on chronic respiratory failure.

3.  Acute encephalopathy likely metabolic, resolved.

4.  Hypertension, resolved.

5.  Severe muscle deconditioning.

6.  History of sick sinus syndrome, status post pacemaker placement on 02/2018.

7.  Chronic diastolic congestive heart failure.

8.  Hypertension.

9.  Morbid obesity.

10.  Obstructive sleep apnea.

11.  Osteoarthritis.

12.  Obesity hypoventilation syndrome on chronic home oxygen.

13.  History of gastrointestinal bleed in 02/2018 status post cauterization for the peptic ulcer.

14.  Iron deficiency anemia.

15.  Coronary artery disease.

 

DISCHARGE DISPOSITION:  Back to his nursing home at [facility].

 

DISCHARGE MEDICATIONS:  Robitussin-DM as needed, Proscar 5 mg daily, Zocor 20 mg daily, Flomax 0.4 mg
 daily, Protonix 40 mg p.o. b.i.d., Coreg 6.25 mg p.o. b.i.d., Norvasc 5 mg daily, Mucinex as needed 
1200 mg q.12 hours, Avodart 0.5 mg daily, aspirin 81 mg daily, multivitamin daily, lisinopril 10 mg d
aily, DuoNebs as needed every 6 hours, Lasix 40 mg daily, ferrous sulfate 325 mg p.o. b.i.d., Dulcola
x daily as needed.

 

CONSULTATIONS INHOUSE:  Include Pulmonary Medicine, Dr. De Santiago and Dr. Guerra.

 

PROCEDURES DONE IN THE HOSPITAL:  Multiple chest x-rays.

 

HISTORY OF PRESENTING ILLNESS:  Mr. Herbert is a 72-year-old male with complicated past medical history 
and multiple hospitalizations in the last few months who was recently discharged from our hospital on
 03/13/2018 after being diagnosed and treated for community-acquired pneumonia, upper gastrointestina
l bleed secondary to peptic ulcer disease undergoing ablation and cauterization who came back with co
mplaints of shortness of breath and lethargy.  His oxygen saturation was 80% on 4 liters nasal cannul
a and he was noted with blood pressure in the 70s systolic and was found to be cyanotic and pale.  He
 was started on BiPAP in the emergency room and was admitted in the Intermediate Care Unit for furthe
r evaluation.  Please see admission history and physical for further details.

 

HOSPITAL COURSE:  The patient was seen and followed by Pulmonary Medicine while in the hospital.  His
 altered mental status that was evident at the time of presentation gradually improved.  His vital si
gns improved with some normal saline.  He slowly recovered up to the point where he was off of BiPAP 
during the day, but BiPAP at night was recommended by Pulmonary colleagues.  He continuously refused 
to do that and he did not let the respiratory therapists or nurses put on the BiPAP more than for a f
ew minutes every night.  His oxygen saturation remained in the normal range during the daytime, but d
uring the nighttime on and off, he would have some desaturations.  Multiple times, I have discussed t
his problem with him and even though he would say that he would wear the BiPAP on, he would not let t
he nurses keep the BiPAP on at night.  Eventually it was decided that he is back to his status quo an
d can be discharged back to his nursing home for aggressive rehabilitation.  The patient has multiple
 comorbidities and because of multiple hospitalizations and severe acuity, he is deemed a very high r
isk for readmission.

 

At this time, he will be taken off of Plavix because of his recent GI bleed.  Unfortunately, he was s
till on at the time of this admission for some reason.  I discussed this with the on call gastroenter
ologist who has done his EGD during his last admission, Dr. Swain.  He will be taken off of Plavix, b
ut he needs to follow up with his own cardiologist, Dr. Keon Fried to see if he can safely restart t
hat.  At this time, I have no documentation of his coronary artery disease and stenting history wheth
er these stents were placed within the last year or how severe his coronary artery disease is and how
 badly he needs the Plavix.  He was continued on a baby dose of aspirin in the hospital and tolerated
 it very well.  He had no overt bleeding and his H&H remained stable.  He was continued on PPI b.i.d.
  He was started on ferrous sulfate and also given IV iron when his iron levels were checked and were
 still low.

 

He was seen and examined prior to discharge and is back to his baseline.  He is awake, alert, oriente
d.  He is eating well and working with the physical therapist, though he remains severely weak.  I do
 not think that he can achieve any better results by staying in the hospital until unless he starts u
sing BiPAP, which he continues to refuse.  At this time, Pulmonary Medicine has also cleared him for 
discharge from the hospital.  He will be sent back to his nursing home today.

 

He was seen and examined prior to discharge.  Please see Hospitalist progress note from today's date 
for further detail including face-to-face interaction.  Total time spent in the discharge of this pat
ient 38 minutes.  All the paperwork was signed, out of hospital DNR was signed.  The patient's code s
tatus is DNR.

## 2018-03-27 NOTE — PDOC.PN
- Subjective


Encounter Start Date: 03/27/18


Encounter Start Time: 13:43


Subjective: feels better this morning.had difficulty breathing at NH


-: as they do not have bipap.


-: made pt aware that he has refused it repetedly while here prior to DC yeste





- Objective


Resuscitation Status: 


 











Resuscitation Status           DNR:Do Not Resuscitate














MAR Reviewed: Yes


Vital Signs & Weight: 


 Vital Signs (12 hours)











  Temp Pulse Pulse Pulse Resp BP BP


 


 03/27/18 11:16    72  72   120/63  122/59 L


 


 03/27/18 11:12  98.6 F  85    22 H  


 


 03/27/18 08:04   72    18  


 


 03/27/18 08:00  98.5 F  72    18  


 


 03/27/18 07:47  98.5 F  60    18  


 


 03/27/18 06:08   61    20  


 


 03/27/18 04:02   60    18  


 


 03/27/18 03:06   60    16  


 


 03/27/18 03:05   60    20  


 


 03/27/18 02:03   62    19  














  BP Pulse Ox Pulse Ox Pulse Ox


 


 03/27/18 11:16    90 L  91 L


 


 03/27/18 11:12  119/59 L  93 L  


 


 03/27/18 08:04   89 L  


 


 03/27/18 08:00   85 L  


 


 03/27/18 07:47  139/61  92 L  


 


 03/27/18 06:08  149/77 H  90 L  


 


 03/27/18 04:02  126/67  92 L  


 


 03/27/18 03:06   92 L  


 


 03/27/18 03:05   92 L  


 


 03/27/18 02:03  147/61 H  92 L  








 Weight











Admit Weight                   260 lb


 


Weight                         260 lb














I&O: 


 











 03/26/18 03/27/18 03/28/18





 06:59 06:59 06:59


 


Intake Total  71 840


 


Output Total  560 


 


Balance  -489 840











Result Diagrams: 


 03/27/18 04:40





 03/27/18 04:40


Additional Labs: 





Microbiology





03/21/18 18:50   Urine granados catheter   Urine Culture - Final


                              NO GROWTH AT 48 HOURS


03/21/18 09:43   Venous blood - Right Hand   Blood Culture - Final


                              NO GROWTH IN 5 DAYS


03/21/18 09:43   Venous blood - Left Hand   Blood Culture - Final


                              No growth.


03/15/18 18:20   Venous blood - Right Hand   Blood Culture - Final


                              NO GROWTH IN 5 DAYS


03/15/18 18:20   Venous blood - Left Hand   Blood Culture - Final


                              NO GROWTH IN 5 DAYS


03/15/18 15:55   Urine granados catheter   Urine Culture - Final


                              NO GROWTH AT 36 HOURS


03/04/18 18:25   Urine clean catch   Urine Culture - Final


                              NO GROWTH AT 36 HOURS


03/03/18 12:30   Urine clean catch   Urine Culture - Final


                              NO GROWTH AT 48 HOURS


03/26/18 21:20   Venous blood - Right Hand   Blood Culture - Preliminary


                              Specimen has been received and culture in 

progress.


                              No Growth to date.


03/26/18 21:05   Venous blood - Left Hand   Blood Culture - Preliminary


                              Specimen has been received and culture in 

progress.


                              No Growth to date.


03/26/18 05:00   Urine granados catheter   Urine Culture - Preliminary


                              NO GROWTH AT 12 HOURS











Phys Exam





- Physical Examination


Constitutional: NAD


HEENT: PERRLA, moist MMs, sclera anicteric, oral pharynx no lesions


Neck: no nodes, no JVD, supple, full ROM


Respiratory: no rales, no rhonchi, wheezing present, clear to auscultation 

bilateral


Cardiovascular: RRR, no significant murmur, no rub, gallop


Gastrointestinal: soft, non-tender, no distention, positive bowel sounds


Musculoskeletal: pulses present, edema present


Neurological: non-focal, normal sensation, moves all 4 limbs


Psychiatric: normal affect, A&O x 3


Skin: no rash





Dx/Plan


(1) Acute and chronic respiratory failure


Code(s): J96.20 - ACUTE AND CHR RESP FAILURE, UNSP W HYPOXIA OR HYPERCAPNIA   

Status: Resolved   


Qualifiers: 


   Respiratory failure complication: hypoxia and hypercapnia   Qualified Code(s)

: J96.21 - Acute and chronic respiratory failure with hypoxia; J96.22 - Acute 

and chronic respiratory failure with hypercapnia; J96.22 - Acute and chronic 

respiratory failure with hypercapnia; J96.22 - Acute and chronic respiratory 

failure with hypercapnia   


Comment: due to inability to tolerate Bipap with H/O WILMA   





(2) H/O: GI bleed


Code(s): Z87.19 - PERSONAL HISTORY OF OTHER DISEASES OF THE DIGESTIVE SYSTEM   

Status: Acute   Comment: s/p cauterization 2/2018   





(3) DEVAN (iron deficiency anemia)


Code(s): D50.9 - IRON DEFICIENCY ANEMIA, UNSPECIFIED   Status: Acute   Comment: 

on FeSo4.IV iron given 3/26/18   





(4) Peptic ulcer disease with hemorrhage


Code(s): K27.4 - CHRONIC OR UNSP PEPTIC ULCER, SITE UNSP, WITH HEMORRHAGE   

Status: Acute   Comment: s/p cautery of bleeding vessel, GI signed off, H/H 

stable since transfusion   





(5) Sick sinus syndrome


Code(s): I49.5 - SICK SINUS SYNDROME   Status: Acute   Comment: s/p pacemaker   





(6) Bilateral hip joint arthritis


Code(s): M16.0 - BILATERAL PRIMARY OSTEOARTHRITIS OF HIP   Status: Chronic   

Comment: Pain control, mobilization, PT   





(7) CAD (coronary artery disease)


Code(s): I25.10 - ATHSCL HEART DISEASE OF NATIVE CORONARY ARTERY W/O ANG PCTRS 

  Status: Chronic   Comment: Cardiologist is Keon Fried.Off of plavix due to 

recent GIB   





(8) Diastolic CHF


Code(s): I50.30 - UNSPECIFIED DIASTOLIC (CONGESTIVE) HEART FAILURE   Status: 

Chronic   


Qualifiers: 


   Heart failure chronicity: chronic   Qualified Code(s): I50.32 - Chronic 

diastolic (congestive) heart failure   





(9) HTN (hypertension)


Code(s): I10 - ESSENTIAL (PRIMARY) HYPERTENSION   Status: Chronic   


Qualifiers: 


   Hypertension type: essential hypertension   Qualified Code(s): I10 - 

Essential (primary) hypertension   





(10) Morbid obesity


Code(s): E66.01 - MORBID (SEVERE) OBESITY DUE TO EXCESS CALORIES   Status: 

Chronic   





(11) WILMA (obstructive sleep apnea)


Code(s): G47.33 - OBSTRUCTIVE SLEEP APNEA (ADULT) (PEDIATRIC)   Status: Chronic

   





(12) Severe muscle deconditioning


Code(s): R29.898 - OTH SYMPTOMS AND SIGNS INVOLVING THE MUSCULOSKELETAL SYSTEM 

  Status: Chronic   Comment: PT/OT for mobilization, OOB as tolerated with 

assistance   





(13) Obesity hypoventilation syndrome


Code(s): E66.2 - MORBID (SEVERE) OBESITY WITH ALVEOLAR HYPOVENTILATION   Status

: Suspected   Comment: O2 via NC continuously   





- Plan


PT/OT, respiratory therapy, incentive spirometry, out of bed/ambulate, DVT 

proph w/SCDs


Needs to wear bipap.will try with some anti anxiety med tonight


-: arrange [facility] & DC when arranged


-: PCT consulted for goals of care


-: if pt can not wear Bipap,hospice is only option


-: am labs.will follow





* .








Review of Systems





- Review of Systems


Constitutional: weakness, malaise.  negative: fever, chills, sweats, other


Eyes: negative: Pain, Vision Change, Conjunctivae Inflammation, Eyelid 

Inflammation, Redness, Other


ENT: negative: Ear Pain, Ear Discharge, Nose Pain, Nose Discharge, Nose 

Congestion, Mouth Pain, Mouth Swelling, Throat Pain, Throat Swelling, Other


Respiratory: SOB with Excertion.  negative: Cough, Dry, Shortness of Breath, 

Hemoptysis, Pleuritic Pain, Sputum, Wheezing


Cardiovascular: negative: chest pain, palpitations, orthopnea, paroxysmal 

nocturnal dyspnea, edema, light headedness, other


Gastrointestinal: negative: Nausea, Vomiting, Abdominal Pain, Diarrhea, 

Constipation, Melena, Hematochezia, Other


Genitourinary: negative: Dysuria, Frequency, Incontinence, Hematuria, Retention

, Other


Musculoskeletal: negative: Neck Pain, Shoulder Pain, Arm Pain, Back Pain, Hand 

Pain, Leg Pain, Foot Pain, Other


Skin: negative: Rash, Lesions, Jason, Bruising, Other


Neurological: negative: Weakness, Numbness, Incoordination, Change in Speech, 

Confusion, Seizures, Other





- Medications/Allergies


Allergies/Adverse Reactions: 


 Allergies











Allergy/AdvReac Type Severity Reaction Status Date / Time


 


No Known Drug Allergies Allergy   Verified 02/26/18 14:53











Medications: 


 Current Medications





Acetaminophen (Tylenol)  650 mg PO Q4H PRN


   PRN Reason: Headache/Fever or Pain


Hydrocodone Bitart/Acetaminophen (Norco 5/325)  1 tab PO Q4H PRN


   PRN Reason: Moderate Pain (4-6)


Albuterol Sulfate (Ventolin)  2.5 mg NEB [id number] PRN


   PRN Reason: SOB &/or Wheezing


Albuterol/Ipratropium (Duoneb)  3 ml NEB [id number] [facility]


   Last Admin: 03/27/18 08:04 Dose:  3 ml


Amlodipine Besylate (Norvasc)  5 mg PO DAILY [facility]


   Last Admin: 03/27/18 08:31 Dose:  5 mg


Aspirin (Ecotrin)  81 mg PO DAILY [facility]


   Last Admin: 03/27/18 08:31 Dose:  81 mg


Atorvastatin Calcium (Lipitor)  10 mg PO HS [facility]


Bisacodyl (Dulcolax)  10 mg DC DAILY PRN


   PRN Reason: Constipation


Bismuth Subsalicylate (Pepto Bismol)  2 tab PO Q1H PRN


   PRN Reason: Diarrhea/Loose Stools


   Last Admin: 03/27/18 03:15 Dose:  2 tab


Carvedilol (Coreg)  6.25 mg PO BID [facility]


   Last Admin: 03/27/18 08:31 Dose:  6.25 mg


Docusate Sodium (Colace)  100 mg PO DAILY PRN


   PRN Reason: Constipation


Dutasteride (Avodart)  0.5 mg PO DAILY [facility]


   Last Admin: 03/27/18 08:31 Dose:  0.5 mg


Enoxaparin Sodium (Lovenox)  40 mg SC 0900 [facility]


   Last Admin: 03/27/18 08:32 Dose:  40 mg


Famotidine (Pepcid)  20 mg SLOW IVP Q12HR [facility]


   Last Admin: 03/27/18 08:32 Dose:  20 mg


Ferrous Sulfate (Feosol)  325 mg PO BID-[facility]


   Last Admin: 03/27/18 08:31 Dose:  325 mg


Finasteride (Proscar)  5 mg PO DAILY [facility]


   Last Admin: 03/27/18 08:31 Dose:  5 mg


Furosemide (Lasix)  40 mg PO DAILY-[facility]


   Last Admin: 03/27/18 08:31 Dose:  40 mg


Guaifenesin (Mucinex)  1,200 mg PO Q12HR PRN


   PRN Reason: Cough


Guaifenesin/Dextromethorphan (Robitussin Dm)  15 ml PO Q4H PRN


   PRN Reason: Cough


Iron/Minerals/Multivitamins (Theragran M)  1 tab PO DAILY [facility]


   Last Admin: 03/27/18 08:31 Dose:  1 tab


Lisinopril (Zestril)  10 mg PO HS [facility]


Methylprednisolone Sodium Succinate (Solu-Medrol)  40 mg IVP Q6HR [facility]


   Stop: 03/28/18 06:01


   Last Admin: 03/27/18 11:31 Dose:  40 mg


Mometasone Furoate/Formoterol Fumar (Dulera 200 Mcg/5 Mcg Inhaler)  2 puff INH 

BID-RT [facility]


Ondansetron HCl (Zofran)  4 mg IVP Q6H PRN


   PRN Reason: Nausea/Vomiting


Pantoprazole Sodium (Protonix)  40 mg PO BID [facility]


   Last Admin: 03/27/18 08:31 Dose:  40 mg


Prednisone (Prednisone)  20 mg PO QAM-[facility]


Sodium Chloride (Flush - Normal Saline)  10 ml IVF Q12HR [facility]


   Last Admin: 03/27/18 08:32 Dose:  10 ml


Sodium Chloride (Flush - Normal Saline)  10 ml IVF PRN PRN


   PRN Reason: Saline Flush


Tamsulosin HCl (Flomax)  0.4 mg PO DAILY [facility]


   Last Admin: 03/27/18 08:31 Dose:  0.4 mg

## 2018-03-27 NOTE — HP
DATE OF ADMISSION:  2018

 

CHIEF COMPLAINT:  Shortness of breath.

 

HISTORY OF PRESENT ILLNESS:  This is a 72-year-old white male who was recently admitted and discharge
d to nursing home.  He was recently admitted and discharged to nursing home yesterday and on home oxy
gen.  The patient was unable to breathe and was feeling acutely short of breath, so the patient was s
ent back to the ER for further evaluation.  The patient arrived in the ER, he was acutely short of br
eath and was started on BiPAP.  The patient was asked clearly if he needed to be intubated and he checo
gaby stated patient is DNR and also does not need to be intubated.  He was continued on the BiPAP, di
d not have any chest pain, no nausea, no vomiting, no diarrhea, no constipation.  The patient had a v
marisela long stay during this recent admission, had multiple comorbidities.  Please see the most recent H
PI for more details.  The patient was seen today in the IMCU, he was alert, he was drowsy, and was on
 the continuous BiPAP.  He denies having any chest pain, no nausea, no vomiting at this time.  He den
ies having any fever.

 

PAST MEDICAL HISTORY:

1.  Status post community-acquired pneumonia from 2018 to 2018.

2.  Status post sepsis secondary to above.

3.  Peptic ulcer disease.

4.  Acute blood loss anemia.

5.  Chronic diastolic congestive heart failure, sick sinus syndrome, status post pacemaker.

6.  History of atrial flutter, acute on chronic hypoxemic respiratory failure.

7.  Morbid obesity, obstructive sleep apnea.

 

PAST SURGICAL HISTORY:  Status post endoscopy with cauterization of the vessels with status post abla
tion for atrial flutter, status post pacemaker placement.

 

ALLERGIES:  No known drug allergies.

 

HOME MEDICATIONS:  Tylenol, Norvasc, Coreg 6.25 mg p.o. b.i.d., Plavix 75 mg p.o. daily, Lasix 40 mg 
p.o. daily, guaifenesin, lisinopril 20 mg p.o. b.i.d., Protonix 40 mg p.o. b.i.d., simvastatin 20 mg 
p.o. daily, Flomax 0.4 mg p.o. daily.

 

FAMILY HISTORY:  Mother  of complications of lymphedema at 62.  Father  of complications of C
HF at 82.

 

SOCIAL HISTORY:  The patient is  and resides in [place], [place].  The patient was currently at [facility] and is being discharged from acute care.  Otherwise, he is a former smoker.  No alcohol, n
o history of illicit drug use.

 

REVIEW OF SYSTEMS:  Unable to obtain as the patient is on BiPAP at this time.

 

PHYSICAL EXAMINATION:

VITAL SIGNS:  Blood pressures are 144/70, heart rate is 61, respiratory rate is 18, and saturation 93
%,

GENERAL:  The patient is moderately built, moderately nourished.  He does not appear to be in acute d
istress at this time.  He is alert, oriented x3.  He is pretty lethargic at this time.

HEENT:  Atraumatic, normocephalic.  PERRLA, extraocular movements were intact.  Oral mucosa pink and 
moist.

CARDIOVASCULAR:  S1, S2 normal.  No murmurs, rubs or gallops.

LUNGS:  Bilateral air entry was equal.  No wheezing, no crackles.

ABDOMEN:  Soft, nontender, no guarding, no rebound tenderness.  Bowel sounds normal.

MUSCULOSKELETAL:  No calf tenderness or pedal edema.  No joint tenderness.  No joint swelling.

SKIN:  No cyanosis, no erythema, no rash, no pallor.

CRANIAL NERVOUS SYSTEM:  Cranial nerve examination II-XII intact.  No focal deficits are noted.

 

LABORATORY DATA:  WBC 6.3, hemoglobin 11.6, hematocrit 36.1, platelets 172.  Sodium is 138, potassium
 4.3, chloride is 93, BUN is 17, and creatinine 0.85.  BNP is 267.

 

ASSESSMENT:

1.  Acute hypoxemic respiratory failure.

2.  Acute chronic obstructive pulmonary disease exacerbation.

3.  Obstructive sleep apnea.

4.  History of congestive heart failure with diastolic dysfunction.

5.  History of status post pacemaker.

6.  Type 2 diabetes mellitus.

7.  History of anemia with recent gastrointestinal bleed.

 

PLAN:

1.  Plan is to continue the patient with the BiPAP at this time.  The patient is a DNR and DNI.  We w
ill closely monitor and we will consult Pulmonary in the morning.

2.  We will continue the patient on albuterol and DuoNeb nebulizer treatments at this time as needed 
and would not start any steroids at this time as patient's lung sounds better with no wheezing curren
tly noted.

3.  History of type 2 diabetes mellitus.  We will continue the patient on home medications.

4.  History of congestive heart failure.  We will restart the patient on the Lasix 40 mg p.o. daily.

5.  History of hypertension.  We will restart the patient on lisinopril 10 mg p.o. daily.

6.  The patient has history of hyperlipidemia.  We will continue the patient on simvastatin.

7.  Deep venous thrombosis prophylaxis, Lovenox.

 

I spent 70 minutes of this patient.

## 2018-03-28 RX ADMIN — MOMETASONE FUROATE AND FORMOTEROL FUMARATE DIHYDRATE SCH PUFF: 200; 5 AEROSOL RESPIRATORY (INHALATION) at 17:59

## 2018-03-28 RX ADMIN — CEFEPIME HYDROCHLORIDE SCH MLS: 2 INJECTION, POWDER, FOR SOLUTION INTRAVENOUS at 20:31

## 2018-03-28 RX ADMIN — CEFEPIME HYDROCHLORIDE SCH MLS: 2 INJECTION, POWDER, FOR SOLUTION INTRAVENOUS at 08:39

## 2018-03-28 RX ADMIN — Medication SCH ML: at 20:31

## 2018-03-28 RX ADMIN — MOMETASONE FUROATE AND FORMOTEROL FUMARATE DIHYDRATE SCH PUFF: 200; 5 AEROSOL RESPIRATORY (INHALATION) at 08:09

## 2018-03-28 RX ADMIN — FAMOTIDINE SCH MG: 10 INJECTION, SOLUTION INTRAVENOUS at 20:31

## 2018-03-28 RX ADMIN — ASPIRIN SCH MG: 81 TABLET ORAL at 08:28

## 2018-03-28 RX ADMIN — FAMOTIDINE SCH MG: 10 INJECTION, SOLUTION INTRAVENOUS at 08:28

## 2018-03-28 RX ADMIN — MULTIPLE VITAMINS W/ MINERALS TAB SCH TAB: TAB at 08:29

## 2018-03-28 RX ADMIN — Medication SCH ML: at 08:29

## 2018-03-28 NOTE — PDOC.PN
- Subjective


Encounter Start Date: 03/28/18


Encounter Start Time: 15:24


Subjective: feels better.was not able to keep Bipap on last night either





- Objective


Resuscitation Status: 


 











Resuscitation Status           DNR:Do Not Resuscitate














MAR Reviewed: Yes


Vital Signs & Weight: 


 Vital Signs (12 hours)











  Temp Pulse Pulse Pulse Resp BP BP


 


 03/28/18 14:52   61    22 H  


 


 03/28/18 11:33  99.2 F  61    13  


 


 03/28/18 10:47   61    20  


 


 03/28/18 09:38    64  61   129/61  130/60


 


 03/28/18 08:27   67     


 


 03/28/18 08:09   67    18  


 


 03/28/18 07:52  98.3 F  77    16  


 


 03/28/18 07:49  99.2 F  62    20  


 


 03/28/18 04:04  99.2 F  62    20  














  BP Pulse Ox Pulse Ox Pulse Ox


 


 03/28/18 14:52   93 L  


 


 03/28/18 11:33  125/60  94 L  


 


 03/28/18 10:47   96  


 


 03/28/18 09:38    90 L  94 L


 


 03/28/18 08:27    


 


 03/28/18 08:09    


 


 03/28/18 07:52  127/62  97  


 


 03/28/18 07:49   93 L  


 


 03/28/18 04:04  120/57 L  91 L  








 Weight











Admit Weight                   260 lb


 


Weight                         260 lb














I&O: 


 











 03/27/18 03/28/18 03/29/18





 06:59 06:59 06:59


 


Intake Total 71 1840 


 


Output Total 560 2500 


 


Balance -489 -660 











Result Diagrams: 


 03/27/18 04:40





 03/27/18 04:40


Additional Labs: 





Microbiology





03/26/18 05:00   Urine granados catheter   Urine Culture - Final


                              NO GROWTH AT 36 HOURS


03/21/18 18:50   Urine granados catheter   Urine Culture - Final


                              NO GROWTH AT 48 HOURS


03/21/18 09:43   Venous blood - Right Hand   Blood Culture - Final


                              NO GROWTH IN 5 DAYS


03/21/18 09:43   Venous blood - Left Hand   Blood Culture - Final


                              No growth.


03/15/18 18:20   Venous blood - Right Hand   Blood Culture - Final


                              NO GROWTH IN 5 DAYS


03/15/18 18:20   Venous blood - Left Hand   Blood Culture - Final


                              NO GROWTH IN 5 DAYS


03/15/18 15:55   Urine granados catheter   Urine Culture - Final


                              NO GROWTH AT 36 HOURS


03/04/18 18:25   Urine clean catch   Urine Culture - Final


                              NO GROWTH AT 36 HOURS


03/03/18 12:30   Urine clean catch   Urine Culture - Final


                              NO GROWTH AT 48 HOURS


03/26/18 21:20   Venous blood - Right Hand   Blood Culture - Preliminary


                              Gram Positive Marquez


03/26/18 21:20   Venous blood - Right Hand   Blood Culture - Preliminary


                              Gram Positive Marquez


03/26/18 21:05   Venous blood - Left Hand   Blood Culture - Preliminary


                              Gram Positive Marquez


03/26/18 21:05   Venous blood - Left Hand   Blood Culture - Preliminary


                              Gram Positive Marquez


03/26/18 05:00   Urine granados catheter   Urine Culture - Preliminary


                              NO GROWTH AT 12 HOURS











Phys Exam





- Physical Examination


Constitutional: NAD


HEENT: PERRLA, moist MMs, sclera anicteric, oral pharynx no lesions


Neck: no nodes, no JVD, supple, full ROM


Respiratory: no wheezing, no rales, no rhonchi


coarse 


Cardiovascular: RRR, no significant murmur, no rub, gallop, irregular


Gastrointestinal: soft, non-tender, no distention, positive bowel sounds


Musculoskeletal: pulses present, edema present


Neurological: non-focal, normal sensation, moves all 4 limbs


Psychiatric: normal affect, A&O x 3


Skin: no rash





Dx/Plan


(1) Gram-negative bacteremia


Code(s): R78.81 - BACTEREMIA   Status: Acute   Comment: 2/2   





(2) Acute and chronic respiratory failure


Code(s): J96.20 - ACUTE AND CHR RESP FAILURE, UNSP W HYPOXIA OR HYPERCAPNIA   

Status: Resolved   


Qualifiers: 


   Respiratory failure complication: hypoxia and hypercapnia   Qualified Code(s)

: J96.21 - Acute and chronic respiratory failure with hypoxia; J96.22 - Acute 

and chronic respiratory failure with hypercapnia; J96.22 - Acute and chronic 

respiratory failure with hypercapnia; J96.22 - Acute and chronic respiratory 

failure with hypercapnia   


Comment: due to inability to tolerate Bipap with H/O WILMA   





(3) H/O: GI bleed


Code(s): Z87.19 - PERSONAL HISTORY OF OTHER DISEASES OF THE DIGESTIVE SYSTEM   

Status: Acute   Comment: s/p cauterization 2/2018   





(4) DEVAN (iron deficiency anemia)


Code(s): D50.9 - IRON DEFICIENCY ANEMIA, UNSPECIFIED   Status: Acute   Comment: 

on FeSo4.IV iron given 3/26/18   





(5) Peptic ulcer disease with hemorrhage


Code(s): K27.4 - CHRONIC OR UNSP PEPTIC ULCER, SITE UNSP, WITH HEMORRHAGE   

Status: Acute   Comment: s/p cautery of bleeding vessel, GI signed off, H/H 

stable since transfusion   





(6) Sick sinus syndrome


Code(s): I49.5 - SICK SINUS SYNDROME   Status: Acute   Comment: s/p pacemaker   





(7) Bilateral hip joint arthritis


Code(s): M16.0 - BILATERAL PRIMARY OSTEOARTHRITIS OF HIP   Status: Chronic   

Comment: Pain control, mobilization, PT   





(8) CAD (coronary artery disease)


Code(s): I25.10 - ATHSCL HEART DISEASE OF NATIVE CORONARY ARTERY W/O ANG PCTRS 

  Status: Chronic   Comment: Cardiologist is Keon Fried.Off of plavix due to 

recent GIB   





(9) Diastolic CHF


Code(s): I50.30 - UNSPECIFIED DIASTOLIC (CONGESTIVE) HEART FAILURE   Status: 

Chronic   


Qualifiers: 


   Heart failure chronicity: chronic   Qualified Code(s): I50.32 - Chronic 

diastolic (congestive) heart failure   





(10) HTN (hypertension)


Code(s): I10 - ESSENTIAL (PRIMARY) HYPERTENSION   Status: Chronic   


Qualifiers: 


   Hypertension type: essential hypertension   Qualified Code(s): I10 - 

Essential (primary) hypertension   





(11) Morbid obesity


Code(s): E66.01 - MORBID (SEVERE) OBESITY DUE TO EXCESS CALORIES   Status: 

Chronic   





(12) WILMA (obstructive sleep apnea)


Code(s): G47.33 - OBSTRUCTIVE SLEEP APNEA (ADULT) (PEDIATRIC)   Status: Chronic

   





(13) Severe muscle deconditioning


Code(s): R29.898 - OTH SYMPTOMS AND SIGNS INVOLVING THE MUSCULOSKELETAL SYSTEM 

  Status: Chronic   Comment: PT/OT for mobilization, OOB as tolerated with 

assistance   





(14) Obesity hypoventilation syndrome


Code(s): E66.2 - MORBID (SEVERE) OBESITY WITH ALVEOLAR HYPOVENTILATION   Status

: Suspected   Comment: O2 via NC continuously   





- Plan


PT/OT, , incentive spirometry, out of bed/ambulate, DVT proph w/

SCDs


aggressive rehab.trying to arrange Bipap for NH for DC


-: add prn anti-anxiety/mild sedative hs for Bipap trial.


-: 2/2 Blood Cx +ve for GNR.on Cefepime for now.will consult ID


-: hemodynamically stable.


-: am labs





* .








Review of Systems





- Review of Systems


Constitutional: weakness, malaise.  negative: fever, chills, sweats, other


Eyes: negative: Pain, Vision Change, Conjunctivae Inflammation, Eyelid 

Inflammation, Redness, Other


ENT: negative: Ear Pain, Ear Discharge, Nose Pain, Nose Discharge, Nose 

Congestion, Mouth Pain, Mouth Swelling, Throat Pain, Throat Swelling, Other


Respiratory: Cough, SOB with Excertion.  negative: Dry, Shortness of Breath, 

Hemoptysis, Pleuritic Pain, Sputum, Wheezing


Cardiovascular: negative: chest pain, palpitations, orthopnea, paroxysmal 

nocturnal dyspnea, edema, light headedness, other


Gastrointestinal: negative: Nausea, Vomiting, Abdominal Pain, Diarrhea, 

Constipation, Melena, Hematochezia, Other


Genitourinary: negative: Dysuria, Frequency, Incontinence, Hematuria, Retention

, Other


Musculoskeletal: negative: Neck Pain, Shoulder Pain, Arm Pain, Back Pain, Hand 

Pain, Leg Pain, Foot Pain, Other


Skin: negative: Rash, Lesions, Jason, Bruising, Other


Neurological: negative: Weakness, Numbness, Incoordination, Change in Speech, 

Confusion, Seizures, Other





- Medications/Allergies


Allergies/Adverse Reactions: 


 Allergies











Allergy/AdvReac Type Severity Reaction Status Date / Time


 


No Known Drug Allergies Allergy   Verified 02/26/18 14:53











Medications: 


 Current Medications





Acetaminophen (Tylenol)  650 mg PO Q4H PRN


   PRN Reason: Headache/Fever or Pain


   Last Admin: 03/28/18 14:26 Dose:  650 mg


Hydrocodone Bitart/Acetaminophen (Norco 5/325)  1 tab PO Q4H PRN


   PRN Reason: Moderate Pain (4-6)


   Last Admin: 03/27/18 23:12 Dose:  1 tab


Albuterol Sulfate (Ventolin)  2.5 mg NEB [id number] PRN


   PRN Reason: SOB &/or Wheezing


Albuterol/Ipratropium (Duoneb)  3 ml NEB [id number] [facility]


   Last Admin: 03/28/18 14:52 Dose:  3 ml


Amlodipine Besylate (Norvasc)  5 mg PO DAILY [facility]


   Last Admin: 03/28/18 08:27 Dose:  5 mg


Aspirin (Ecotrin)  81 mg PO DAILY [facility]


   Last Admin: 03/28/18 08:28 Dose:  81 mg


Atorvastatin Calcium (Lipitor)  10 mg PO HS [facility]


   Last Admin: 03/27/18 21:10 Dose:  10 mg


Bisacodyl (Dulcolax)  10 mg IA DAILY PRN


   PRN Reason: Constipation


Bismuth Subsalicylate (Pepto Bismol)  2 tab PO Q1H PRN


   PRN Reason: Diarrhea/Loose Stools


   Last Admin: 03/27/18 03:15 Dose:  2 tab


Carvedilol (Coreg)  6.25 mg PO BID [facility]


   Last Admin: 03/28/18 08:28 Dose:  6.25 mg


Docusate Sodium (Colace)  100 mg PO DAILY PRN


   PRN Reason: Constipation


Dutasteride (Avodart)  0.5 mg PO DAILY [facility]


   Last Admin: 03/28/18 08:28 Dose:  0.5 mg


Enoxaparin Sodium (Lovenox)  40 mg SC 0900 [facility]


   Last Admin: 03/28/18 08:28 Dose:  40 mg


Famotidine (Pepcid)  20 mg SLOW IVP Q12HR [facility]


   Last Admin: 03/28/18 08:28 Dose:  20 mg


Ferrous Sulfate (Feosol)  325 mg PO BID-[facility]


   Last Admin: 03/28/18 08:27 Dose:  325 mg


Finasteride (Proscar)  5 mg PO DAILY [facility]


   Last Admin: 03/28/18 08:28 Dose:  5 mg


Furosemide (Lasix)  40 mg PO DAILY-[facility]


   Last Admin: 03/28/18 08:27 Dose:  40 mg


Guaifenesin (Mucinex)  1,200 mg PO Q12HR PRN


   PRN Reason: Cough


Guaifenesin/Dextromethorphan (Robitussin Dm)  15 ml PO Q4H PRN


   PRN Reason: Cough


Cefepime HCl 2 gm/ Syringe 2.5 (ml/ Sodium Chloride)  12.5 mls @ 150 mls/hr 

SLOW IVP Q12HR [facility]


   Last Admin: 03/28/18 08:39 Dose:  12.5 mls


Iron/Minerals/Multivitamins (Theragran M)  1 tab PO DAILY [facility]


   Last Admin: 03/28/18 08:29 Dose:  1 tab


Lisinopril (Zestril)  10 mg PO [facility]


   Last Admin: 03/27/18 21:10 Dose:  10 mg


Mometasone Furoate/Formoterol Fumar (Dulera 200 Mcg/5 Mcg Inhaler)  2 puff INH 

BID-RT [facility]


   Last Admin: 03/28/18 08:09 Dose:  2 puff


Ondansetron HCl (Zofran)  4 mg IVP Q6H PRN


   PRN Reason: Nausea/Vomiting


Pantoprazole Sodium (Protonix)  40 mg PO BID [facility]


   Last Admin: 03/28/18 08:29 Dose:  40 mg


Prednisone (Prednisone)  20 mg PO QAM-WM [facility]


   Last Admin: 03/28/18 08:27 Dose:  20 mg


Sodium Chloride (Flush - Normal Saline)  10 ml IVF Q12HR [facility]


   Last Admin: 03/28/18 08:29 Dose:  10 ml


Sodium Chloride (Flush - Normal Saline)  10 ml IVF PRN PRN


   PRN Reason: Saline Flush


Tamsulosin HCl (Flomax)  0.4 mg PO DAILY [facility]


   Last Admin: 03/28/18 08:29 Dose:  0.4 mg


Temazepam (Restoril)  15 mg PO HSPRN PRN


   PRN Reason: Insomnia


Trazodone HCl (Desyrel)  50 mg PO HS PRN


   PRN Reason: Insomnia

## 2018-03-29 LAB
ANION GAP SERPL CALC-SCNC: 12 MMOL/L (ref 10–20)
BASOPHILS # BLD AUTO: 0 THOU/UL (ref 0–0.2)
BASOPHILS NFR BLD AUTO: 0 % (ref 0–1)
BUN SERPL-MCNC: 23 MG/DL (ref 8.4–25.7)
CALCIUM SERPL-MCNC: 8 MG/DL (ref 7.8–10.44)
CHLORIDE SERPL-SCNC: 95 MMOL/L (ref 98–107)
CO2 SERPL-SCNC: 35 MMOL/L (ref 23–31)
CREAT CL PREDICTED SERPL C-G-VRATE: 151 ML/MIN (ref 70–130)
EOSINOPHIL # BLD AUTO: 0 THOU/UL (ref 0–0.7)
EOSINOPHIL NFR BLD AUTO: 0.2 % (ref 0–10)
GLUCOSE SERPL-MCNC: 131 MG/DL (ref 80–115)
HGB BLD-MCNC: 10 G/DL (ref 14–18)
LYMPHOCYTES # BLD: 0.4 THOU/UL (ref 1.2–3.4)
LYMPHOCYTES NFR BLD AUTO: 4.1 % (ref 21–51)
MCH RBC QN AUTO: 29.8 PG (ref 27–31)
MCV RBC AUTO: 95.3 FL (ref 80–94)
MONOCYTES # BLD AUTO: 0.6 THOU/UL (ref 0.11–0.59)
MONOCYTES NFR BLD AUTO: 7.2 % (ref 0–10)
NEUTROPHILS # BLD AUTO: 7.4 THOU/UL (ref 1.4–6.5)
NEUTROPHILS NFR BLD AUTO: 88.5 % (ref 42–75)
PLATELET # BLD AUTO: 188 THOU/UL (ref 130–400)
POTASSIUM SERPL-SCNC: 3.3 MMOL/L (ref 3.5–5.1)
RBC # BLD AUTO: 3.37 MILL/UL (ref 4.7–6.1)
SODIUM SERPL-SCNC: 139 MMOL/L (ref 136–145)
WBC # BLD AUTO: 8.4 THOU/UL (ref 4.8–10.8)

## 2018-03-29 RX ADMIN — CEFEPIME HYDROCHLORIDE SCH MLS: 2 INJECTION, POWDER, FOR SOLUTION INTRAVENOUS at 21:06

## 2018-03-29 RX ADMIN — MOMETASONE FUROATE AND FORMOTEROL FUMARATE DIHYDRATE SCH PUFF: 200; 5 AEROSOL RESPIRATORY (INHALATION) at 10:50

## 2018-03-29 RX ADMIN — Medication SCH ML: at 21:04

## 2018-03-29 RX ADMIN — ASPIRIN SCH MG: 81 TABLET ORAL at 09:48

## 2018-03-29 RX ADMIN — HYDROCODONE BITARTRATE AND ACETAMINOPHEN PRN TAB: 5; 325 TABLET ORAL at 22:39

## 2018-03-29 RX ADMIN — FAMOTIDINE SCH MG: 10 INJECTION, SOLUTION INTRAVENOUS at 09:48

## 2018-03-29 RX ADMIN — Medication SCH ML: at 09:49

## 2018-03-29 RX ADMIN — MULTIPLE VITAMINS W/ MINERALS TAB SCH TAB: TAB at 09:48

## 2018-03-29 RX ADMIN — CEFEPIME HYDROCHLORIDE SCH MLS: 2 INJECTION, POWDER, FOR SOLUTION INTRAVENOUS at 09:52

## 2018-03-29 RX ADMIN — MOMETASONE FUROATE AND FORMOTEROL FUMARATE DIHYDRATE SCH PUFF: 200; 5 AEROSOL RESPIRATORY (INHALATION) at 18:40

## 2018-03-29 NOTE — PDOC.PN
- Subjective


Encounter Start Date: 03/29/18


Encounter Start Time: 10:00


-: old records requested/rev





Patient seen and examined. No new complaints. No overnight events





- Objective


Resuscitation Status: 


 











Resuscitation Status           DNR:Do Not Resuscitate














MAR Reviewed: Yes


Vital Signs & Weight: 


 Vital Signs (12 hours)











  Temp Pulse Resp BP BP Pulse Ox


 


 03/29/18 10:50   60  16    93 L


 


 03/29/18 09:47   60    


 


 03/29/18 09:45     119/62  


 


 03/29/18 08:20  98.9 F  60  18    92 L


 


 03/29/18 07:15  98.9 F  60  18   142/71 H  96


 


 03/29/18 04:04  98.3 F  60  18   131/64  93 L


 


 03/29/18 01:53   64  18    95


 


 03/29/18 01:44   93  16    96


 


 03/29/18 00:31  98.8 F  60  20   145/72 H  94 L








 Weight











Admit Weight                   260 lb


 


Weight                         260 lb














I&O: 


 











 03/28/18 03/29/18 03/30/18





 06:59 06:59 06:59


 


Intake Total 1840 1640 


 


Output Total 2500 2150 


 


Balance -660 -510 











Result Diagrams: 


 03/29/18 04:15





 03/29/18 04:15


EKG Reviewed by me: Yes





Phys Exam





- Physical Examination


Constitutional: NAD


HEENT: PERRLA, moist MMs, sclera anicteric


Neck: no JVD, supple


Respiratory: no wheezing, no rales, no rhonchi


Cardiovascular: RRR, no significant murmur, no rub


Gastrointestinal: soft, non-tender, no distention, positive bowel sounds


obesity+


granados+


Musculoskeletal: pulses present, edema present


venous stasis changes


Neurological: non-focal, normal sensation


Lymphatic: no nodes


Psychiatric: normal affect


Skin: no rash, normal turgor





Dx/Plan


(1) Acute respiratory failure with hypoxia


Code(s): J96.01 - ACUTE RESPIRATORY FAILURE WITH HYPOXIA   Status: Acute   

Comment:     





(2) Bacteremia due to Gram-positive bacteria


Code(s): R78.81 - BACTEREMIA   Status: Acute   





(3) COPD exacerbation


Code(s): J44.1 - CHRONIC OBSTRUCTIVE PULMONARY DISEASE W (ACUTE) EXACERBATION   

Status: Acute   





(4) Hypokalemia


Code(s): E87.6 - HYPOKALEMIA   Status: Acute   





(5) Anemia, normocytic normochromic


Code(s): D64.9 - ANEMIA, UNSPECIFIED   Status: Chronic   





(6) BPH (benign prostatic hyperplasia)


Code(s): N40.0 - BENIGN PROSTATIC HYPERPLASIA WITHOUT LOWER URINRY TRACT SYMP   

Status: Chronic   





(7) CAD (coronary artery disease)


Code(s): I25.10 - ATHSCL HEART DISEASE OF NATIVE CORONARY ARTERY W/O ANG PCTRS 

  Status: Chronic   Comment:     





(8) Diastolic CHF


Code(s): I50.30 - UNSPECIFIED DIASTOLIC (CONGESTIVE) HEART FAILURE   Status: 

Chronic   


Qualifiers: 


   Heart failure chronicity: chronic   Qualified Code(s): I50.32 - Chronic 

diastolic (congestive) heart failure   





(9) Dyslipidemia


Code(s): E78.5 - HYPERLIPIDEMIA, UNSPECIFIED   Status: Chronic   





(10) H/O sick sinus syndrome


Code(s): Z86.79 - PERSONAL HISTORY OF OTHER DISEASES OF THE CIRCULATORY SYSTEM 

  Status: Chronic   





(11) HTN (hypertension)


Code(s): I10 - ESSENTIAL (PRIMARY) HYPERTENSION   Status: Chronic   


Qualifiers: 


   Hypertension type: essential hypertension   Qualified Code(s): I10 - 

Essential (primary) hypertension   





(12) Morbid obesity


Code(s): E66.01 - MORBID (SEVERE) OBESITY DUE TO EXCESS CALORIES   Status: 

Chronic   





(13) WILMA (obstructive sleep apnea)


Code(s): G47.33 - OBSTRUCTIVE SLEEP APNEA (ADULT) (PEDIATRIC)   Status: Chronic

   





(14) Osteoarthritis


Code(s): M19.90 - UNSPECIFIED OSTEOARTHRITIS, UNSPECIFIED SITE   Status: 

Chronic   


Qualifiers: 


   Osteoarthritis location: hip   Osteoarthritis type: primary   Laterality: 

bilateral   Qualified Code(s): M16.0 - Bilateral primary osteoarthritis of hip 

  





- Plan


cont current plan of care, continue antibiotics, PT/OT, respiratory therapy





* continue cefepime


* ID team consulted


* medication reviewed as below


* symptomatic treatment


* wean off oxygen as needed


* granados care.


* repeat labs tomorrow


* continue bipap as needed








Review of Systems





- Review of Systems


ENT: negative: Ear Pain, Ear Discharge, Nose Pain, Nose Discharge, Nose 

Congestion, Mouth Pain, Mouth Swelling, Throat Pain, Throat Swelling, Other


Respiratory: negative: Cough, Dry, Shortness of Breath, Hemoptysis, SOB with 

Excertion, Pleuritic Pain, Sputum, Wheezing


Cardiovascular: negative: chest pain, palpitations, orthopnea, paroxysmal 

nocturnal dyspnea, edema, light headedness, other


Gastrointestinal: negative: Nausea, Vomiting, Abdominal Pain, Diarrhea, 

Constipation, Melena, Hematochezia, Other


Genitourinary: negative: Dysuria, Frequency, Incontinence, Hematuria, Retention

, Other


Musculoskeletal: negative: Neck Pain, Shoulder Pain, Arm Pain, Back Pain, Hand 

Pain, Leg Pain, Foot Pain, Other


Skin: negative: Rash, Lesions, Jason, Bruising, Other





- Medications/Allergies


Allergies/Adverse Reactions: 


 Allergies











Allergy/AdvReac Type Severity Reaction Status Date / Time


 


No Known Drug Allergies Allergy   Verified 02/26/18 14:53











Medications: 


 Current Medications





Acetaminophen (Tylenol)  650 mg PO Q4H PRN


   PRN Reason: Headache/Fever or Pain


   Last Admin: 03/28/18 14:26 Dose:  650 mg


Hydrocodone Bitart/Acetaminophen (Norco 5/325)  1 tab PO Q4H PRN


   PRN Reason: Moderate Pain (4-6)


   Last Admin: 03/27/18 23:12 Dose:  1 tab


Albuterol Sulfate (Ventolin)  2.5 mg NEB [id number] PRN


   PRN Reason: SOB &/or Wheezing


Albuterol/Ipratropium (Duoneb)  3 ml NEB [id number]


   Last Admin: 03/29/18 10:50 Dose:  3 ml


Amlodipine Besylate (Norvasc)  5 mg PO DAILY [facility]


   Last Admin: 03/29/18 09:47 Dose:  5 mg


Aspirin (Ecotrin)  81 mg PO DAILY [facility]


   Last Admin: 03/29/18 09:48 Dose:  81 mg


Atorvastatin Calcium (Lipitor)  10 mg PO HS [facility]


   Last Admin: 03/28/18 20:32 Dose:  10 mg


Bisacodyl (Dulcolax)  10 mg IN DAILY PRN


   PRN Reason: Constipation


Bismuth Subsalicylate (Pepto Bismol)  2 tab PO Q1H PRN


   PRN Reason: Diarrhea/Loose Stools


   Last Admin: 03/27/18 03:15 Dose:  2 tab


Carvedilol (Coreg)  6.25 mg PO BID [facility]


   Last Admin: 03/29/18 09:45 Dose:  6.25 mg


Docusate Sodium (Colace)  100 mg PO DAILY PRN


   PRN Reason: Constipation


Dutasteride (Avodart)  0.5 mg PO DAILY [facility]


   Last Admin: 03/29/18 09:48 Dose:  0.5 mg


Enoxaparin Sodium (Lovenox)  40 mg SC 0900 [facility]


   Last Admin: 03/29/18 09:48 Dose:  40 mg


Ferrous Sulfate (Feosol)  325 mg PO BID-[facility]


   Last Admin: 03/29/18 09:47 Dose:  325 mg


Finasteride (Proscar)  5 mg PO DAILY [facility]


   Last Admin: 03/29/18 09:48 Dose:  5 mg


Furosemide (Lasix)  40 mg PO DAILY-[facility]


   Last Admin: 03/29/18 09:48 Dose:  40 mg


Furosemide (Lasix)  40 mg SLOW IVP ONE [facility]


   Stop: 03/29/18 12:00


Guaifenesin (Mucinex)  1,200 mg PO Q12HR PRN


   PRN Reason: Cough


Cefepime HCl 2 gm/ Syringe 2.5 (ml/ Sodium Chloride)  12.5 mls @ 150 mls/hr 

SLOW IVP Q12HR [facility]


   Last Admin: 03/29/18 09:52 Dose:  12.5 mls


Iron/Minerals/Multivitamins (Theragran M)  1 tab PO DAILY [facility]


   Last Admin: 03/29/18 09:48 Dose:  1 tab


Lisinopril (Zestril)  10 mg PO HS [facility]


   Last Admin: 03/28/18 20:32 Dose:  10 mg


Mometasone Furoate/Formoterol Fumar (Dulera 200 Mcg/5 Mcg Inhaler)  2 puff INH 

BID-RT [facility]


   Last Admin: 03/29/18 10:50 Dose:  2 puff


Ondansetron HCl (Zofran)  4 mg IVP Q6H PRN


   PRN Reason: Nausea/Vomiting


Pantoprazole Sodium (Protonix)  40 mg PO BID [facility]


   Last Admin: 03/29/18 09:48 Dose:  40 mg


Potassium Chloride (K-Dur)  40 meq PO ONE [facility]


   Stop: 03/29/18 12:00


Sodium Chloride (Flush - Normal Saline)  10 ml IVF Q12HR [facility]


   Last Admin: 03/29/18 09:49 Dose:  10 ml


Sodium Chloride (Flush - Normal Saline)  10 ml IVF PRN PRN


   PRN Reason: Saline Flush


Tamsulosin HCl (Flomax)  0.4 mg PO DAILY [facility]


   Last Admin: 03/29/18 09:48 Dose:  0.4 mg


Trazodone HCl (Desyrel)  50 mg PO HS PRN


   PRN Reason: Insomnia


   Last Admin: 03/29/18 01:53 Dose:  50 mg

## 2018-03-29 NOTE — PRG
DATE OF SERVICE:  03/29/2018

 

SERVICE:  Pulmonary Medicine.

 

INTERVAL HISTORY:  The patient is doing fine from a respiratory standpoint.  He is breathing comforta
ramona.  He used his BiPAP for just a couple of hours yesterday.  Outside of that, there has been no sig
nificant change to his condition.  The patient has been making inappropriate request.  He is asking f
or his penis and scrotum to be readjusted every 15 minutes.  He is frequently calling nursing staff willy robison in order to help make that adjustment.  He tells him that they have done it wrong and they need t
o do it again.  He is truthfully not wanting to do the work in order to get strong.  He is working to
 the best of his ability with physical therapy,  but outside of that, his expectations are that he is
 going to be cared for with small adjustments and small efforts in the bed.  I basically told him tolaura capps that he is going to need to work on doing more things for himself that his goal is to get better. 
 If it is not, I think that hospice would be appropriate for this patient given his level of debility
 and current functional status.

 

OBJECTIVE:

VITAL SIGNS:  Afebrile, pulse 60, blood pressure 119/62, respirations 18, saturation 92% on 3 liters 
nasal cannula.

GENERAL:  The patient is awake, alert, no apparent distress.

LUNGS:  Decent air entry.  There is no prolonged expiratory phase or crackles present.  A little bit 
of wheezing and rhonchi are both there.

HEART:  Normal rate, regular.

ABDOMEN:  Soft, nontender, nondistended.  Bowel sounds are positive.

MUSCULOSKELETAL:  No cyanosis or clubbing.  There is 1-2+ pitting in the bilateral lower extremities.


NEUROLOGIC:  Grossly nonfocal.

 

LABORATORY DATA:  WBC 8.4, hemoglobin 10.0, platelets 188,000.  Potassium 3.3.  Creatinine 0.76.  Bas
ic metabolic profile is otherwise unremarkable.  Calcium 8.0, .  Troponin 0.019.  Urinalysis i
s essentially unremarkable.  He has a gram-positive kali growing at which time it turned out to be Cor
ynebacterium species growing in 2 out of 2.

 

ASSESSMENT:

1.  Acute on chronic hypoxic and hypercapnic respiratory failure.

2.  Chronic diastolic heart failure, slightly volume overloaded.

3.  Acute blood loss anemia following a recent upper gastrointestinal bleed, improving.

4.  Osteoarthritis of the bilateral hips, severe.

5.  Debility, severe.

6.  Obesity hypoventilation syndrome.

7.  Bacteremia secondary to possible Corynebacterium species.

 

PLAN:  I will touch base with Dr. Hammonds about the organism that was identified.  Repeat blood culture
s will be obtained.  Pulmonary Critical Care will continue to follow while he remains in this locatio
n.  Potassium will be replaced today.

## 2018-03-29 NOTE — CON
DATE OF CONSULTATION:  03/28/2018

 

REASON FOR CONSULTATION:  Bacteremia.

 

HISTORY OF PRESENT ILLNESS:  This is a 72-year-old patient who has a history of COPD, cardiomyopathy 
with pacemaker for management of atrial flutter after ablation and type 2 diabetes who presented with
 worsening dyspnea.  On arrival, he had tachypnea and labored breathing and was given a BiPAP mask tr
eatment.  The patient stated that he was do not resuscitate and did not want to be intubated.  He was
 continued on BiPAP.  There was no reported seizure activity.  No chest pain, nausea, vomiting, no di
arrhea, no genitourinary symptoms.  Initial findings included /70, heart rates of 61, respirati
ons 18, O2 sat 92%, did not appear in distress.  According to report he was alert and oriented, altho
ugh he was lethargic.  Lungs with bilateral breath sounds with no wheezing or crackles.  Heart exam s
howed normal heart sounds, no murmurs.  The abdomen was soft and nontender.  Extremities without any 
changes and he was moving all extremities.

 

INITIAL DATA:  White cell count 6.3, hemoglobin 11.6, platelets 172.  Sodium 138, creatinine 0.85.  B
.

 

Initial impression was hypoxemic respiratory failure, COPD exacerbation, CHF, diastolic dysfunction, 
diabetes mellitus.  Subsequently, had 2 out of 2 sets of blood cultures, gram positive rods yet to be
 further identified.

 

PAST MEDICAL HISTORY:  Cardiomyopathy, atrial flutter ablation, pacemaker, obesity, COPD with hypoven
tilation syndrome, sleep apnea, peptic ulcer disease, anemia, pneumonia treated in 02/26/2018 through
 03/13/2018 with sepsis.

 

PAST SURGICAL HISTORY:  Colonization of gastric vessels, ablation of atrial flutter, pacemaker placem
ent.

 

ALLERGIES:  None.

 

CURRENT MEDICATION LIST:  Tylenol, Norco, Ventolin, DuoNeb, Norvasc, Ecotrin, Lipitor, Dulcolax, Core
g, cefepime, Eligard, Lovenox, Pepcid, Proscar, Lasix, Mucinex, Robitussin, Zestril, omeprazole, Zofr
an, Protonix, prednisone 20 mg, temazepam, Desyrel. 

 

PHYSICAL EXAMINATION: 

VITAL SIGNS:  The patient has been afebrile, T-max 99.5, 99.4, blood pressure 130/64, pulse 60, respi
rations 18.  

SKIN:  The patient has an area of tissue injury in the proximal right thigh, a little bit of bruising
 and there is an area of laceration in the left calf region with somewhat yellow crusting and a littl
e bit of bruising as well.  No lymphadenopathy.

HEENT:  Ocular movements are conjugate.

NECK:  No jugular vein distention.

LUNGS:  Symmetric air entry.  Pacemaker in site with still the dried edges of the incision, but no er
ythema.  No tenderness or swelling.  

CARDIOVASCULAR:  S1, S2, regular rate.  No S3, S4.

ABDOMEN:  Soft, nondistended or tender.  No ascites.  No bladder distention.

EXTREMITIES:  No joint inflammatory activity.  Pulses are diminished in dorsalis pedis.  He is able t
o move all extremities equally.

NEURO:  He is a little bit drowsy, but arousable, follows commands.  

 

Chest x-ray with a cardiomediastinal silhouette at the upper limits of normal, pacemaker unchanged, s
table opacity right lung base, either pleural effusion on the atelectases versus infiltrate.  There i
s an abdomen and pelvis CT from 03/03/2018 that showed renal cysts, diverticulosis, left inguinal her
luis e.

 

ASSESSMENT:  Chronic obstructive pulmonary disease, cardiomyopathy, atrial flutter, pacemaker placeme
nt recently.  Readmitted with hypoxemia, tachypnea and now with a 2 sets of blood cultures with gram 
positive kali yet to be further test to be identified.

 

DISCUSSION:  The differential diagnosis includes true bacteremia with possible colonization of pacema
ker, hospital acquired pneumonia less likely.  Contamination of the sample with a skin organism with 
false positive blood cultures is another possibility.  The samples were obtained about 30 minutes apa
rt, so the technique for collection appears to have been appropriate.  We will have to wait for the barbara albarran identification of the organism to make a decision as to the need for further evaluation and tr
eatment.

## 2018-03-30 LAB
ANION GAP SERPL CALC-SCNC: 9 MMOL/L (ref 10–20)
BUN SERPL-MCNC: 24 MG/DL (ref 8.4–25.7)
CALCIUM SERPL-MCNC: 7.7 MG/DL (ref 7.8–10.44)
CHLORIDE SERPL-SCNC: 97 MMOL/L (ref 98–107)
CO2 SERPL-SCNC: 37 MMOL/L (ref 23–31)
CREAT CL PREDICTED SERPL C-G-VRATE: 155 ML/MIN (ref 70–130)
GLUCOSE SERPL-MCNC: 104 MG/DL (ref 80–115)
MAGNESIUM SERPL-MCNC: 1.9 MG/DL (ref 1.6–2.6)
POTASSIUM SERPL-SCNC: 3.8 MMOL/L (ref 3.5–5.1)
SODIUM SERPL-SCNC: 139 MMOL/L (ref 136–145)
VANCOMYCIN TROUGH SERPL-MCNC: 21.3 UG/ML

## 2018-03-30 RX ADMIN — ASPIRIN SCH MG: 81 TABLET ORAL at 08:26

## 2018-03-30 RX ADMIN — CEFEPIME HYDROCHLORIDE SCH MLS: 2 INJECTION, POWDER, FOR SOLUTION INTRAVENOUS at 08:30

## 2018-03-30 RX ADMIN — VANCOMYCIN HYDROCHLORIDE SCH MLS: 1 INJECTION, POWDER, LYOPHILIZED, FOR SOLUTION INTRAVENOUS at 23:40

## 2018-03-30 RX ADMIN — Medication SCH ML: at 08:27

## 2018-03-30 RX ADMIN — MULTIPLE VITAMINS W/ MINERALS TAB SCH TAB: TAB at 08:24

## 2018-03-30 RX ADMIN — Medication SCH ML: at 20:18

## 2018-03-30 RX ADMIN — MOMETASONE FUROATE AND FORMOTEROL FUMARATE DIHYDRATE SCH PUFF: 200; 5 AEROSOL RESPIRATORY (INHALATION) at 06:07

## 2018-03-30 RX ADMIN — CEFEPIME HYDROCHLORIDE SCH MLS: 2 INJECTION, POWDER, FOR SOLUTION INTRAVENOUS at 20:20

## 2018-03-30 RX ADMIN — MOMETASONE FUROATE AND FORMOTEROL FUMARATE DIHYDRATE SCH PUFF: 200; 5 AEROSOL RESPIRATORY (INHALATION) at 18:42

## 2018-03-30 NOTE — PRG
DATE OF SERVICE:  03/30/2018

 

SERVICE:  Pulmonary Medicine.

 

INTERVAL HISTORY:  The patient is doing fine from a respiratory standpoint.  He is breathing comforta
ramona.  He used his BiPAP last night for a couple of hours.  Outside of this, there has been no interva
l change to his condition.

 

PHYSICAL EXAMINATION:

VITAL SIGNS:  Afebrile, pulse 60, blood pressure 139/63, respirations 20 and saturation 96% on 3 lite
rs nasal cannula.

GENERAL:  The patient is awake and alert, in no apparent distress.

LUNGS:  Decent air entry.  Rhonchi are present.  There is a minimally prolonged expiratory phase with
 some wheezing.  No crackles are appreciated.

HEART:  Normal rate and regular.

ABDOMEN:  Soft, nontender and nondistended.  Bowel sounds are positive.

MUSCULOSKELETAL:  No cyanosis or clubbing.  There is no pitting in the bilateral lower extremities.

NEUROLOGIC:  Grossly nonfocal.

 

LABORATORY DATA:  WBC 8.4, hemoglobin 10.0 and platelets 188,000.  Basic metabolic profile is essenti
ally unremarkable.  Calcium 7.7, magnesium and phosphorus fall within the normal limits.  2 of 2 bloo
d cultures were previously positive for Corynebacterium.  Subsequent blood cultures were unremarkable
.

 

ASSESSMENT:

1.  Acute on chronic hypoxic and hypercapnic respiratory failure.

2.  Chronic diastolic heart failure, with minimal volume overload.

3.  Acute blood loss anemia following a recent upper gastrointestinal bleed.

4.  Osteoarthritis of the bilateral hips, severe.

5.  Debility.

6.  Obesity hypoventilation syndrome.

7.  Obstructive sleep apnea.

8.  Bacteremia, possible secondary to Corynebacterium species.

 

PLAN:  Pulmonary Critical Care will continue to follow while he remains in the [facility].  The patient dm zepeda cannot be handled outside of this location.  He is more minimal to using BiPAP.  If he does good 
through the weekend, we will try setting him up with it once he is out of the hospital.  Speech Patho
logy will be consulted because the patient is currently on a pureed diet.  He wants to see if he can 
advance his texture.

## 2018-03-30 NOTE — PDOC.PN
- Subjective


Encounter Start Date: 03/30/18


Encounter Start Time: 09:30





pt does not want hospice, still has dyspnea, no fever, requires bipap during 

night, wife bedside


Patient seen and examined. No new complaints. No overnight events





- Objective


Resuscitation Status: 


 











Resuscitation Status           DNR:Do Not Resuscitate














MAR Reviewed: Yes


Vital Signs & Weight: 


 Vital Signs (12 hours)











  Temp Pulse Pulse Resp BP BP BP


 


 03/30/18 11:47    116 H    139/63  127/60


 


 03/30/18 11:28  98.5 F  60   20   


 


 03/30/18 10:34   60   20   


 


 03/30/18 10:26   60   18   


 


 03/30/18 09:07  98.8 F  60   18   


 


 03/30/18 08:26   60     


 


 03/30/18 08:25      119/53 L  


 


 03/30/18 07:39  98.8 F  60   18   


 


 03/30/18 06:05   60   16   


 


 03/30/18 04:23  97.2 F L  60   18   


 


 03/30/18 02:26   60   16   














  BP Pulse Ox Pulse Ox Pulse Ox


 


 03/30/18 11:47    88 L  88 L


 


 03/30/18 11:28  127/60  96  


 


 03/30/18 10:34  123/60  97  


 


 03/30/18 10:26   95  


 


 03/30/18 09:07   97  


 


 03/30/18 08:26    


 


 03/30/18 08:25    


 


 03/30/18 07:39  123/58 L  95  


 


 03/30/18 06:05   97  


 


 03/30/18 04:23  125/66  99  


 


 03/30/18 02:26   95  








 Weight











Admit Weight                   260 lb


 


Weight                         260 lb














I&O: 


 











 03/29/18 03/30/18 03/31/18





 06:59 06:59 06:59


 


Intake Total 1640 2551 


 


Output Total 2150 2600 


 


Balance -510 -49 











Result Diagrams: 


 03/29/18 04:15





 03/30/18 04:46


EKG Reviewed by me: Yes





Phys Exam





- Physical Examination


Constitutional: NAD


HEENT: PERRLA, moist MMs, sclera anicteric


Neck: no JVD, supple


Respiratory: no wheezing, no rales, no rhonchi


reduced air entry


Cardiovascular: RRR, no significant murmur, no rub


Gastrointestinal: soft, non-tender, no distention, positive bowel sounds


obesity+


Musculoskeletal: pulses present, edema present


chronic changes in skin


Neurological: non-focal, normal sensation


Lymphatic: no nodes


Psychiatric: normal affect


Skin: no rash, normal turgor





Dx/Plan


(1) Acute respiratory failure with hypoxia


Code(s): J96.01 - ACUTE RESPIRATORY FAILURE WITH HYPOXIA   Status: Acute   

Comment:     





(2) Bacteremia due to Gram-positive bacteria


Code(s): R78.81 - BACTEREMIA   Status: Acute   





(3) COPD exacerbation


Code(s): J44.1 - CHRONIC OBSTRUCTIVE PULMONARY DISEASE W (ACUTE) EXACERBATION   

Status: Acute   





(4) Hypokalemia


Code(s): E87.6 - HYPOKALEMIA   Status: Acute   





(5) Anemia, normocytic normochromic


Code(s): D64.9 - ANEMIA, UNSPECIFIED   Status: Chronic   





(6) BPH (benign prostatic hyperplasia)


Code(s): N40.0 - BENIGN PROSTATIC HYPERPLASIA WITHOUT LOWER URINRY TRACT SYMP   

Status: Chronic   





(7) CAD (coronary artery disease)


Code(s): I25.10 - ATHSCL HEART DISEASE OF NATIVE CORONARY ARTERY W/O ANG PCTRS 

  Status: Chronic   Comment:     





(8) Diastolic CHF


Code(s): I50.30 - UNSPECIFIED DIASTOLIC (CONGESTIVE) HEART FAILURE   Status: 

Chronic   


Qualifiers: 


   Heart failure chronicity: chronic   Qualified Code(s): I50.32 - Chronic 

diastolic (congestive) heart failure   





(9) Dyslipidemia


Code(s): E78.5 - HYPERLIPIDEMIA, UNSPECIFIED   Status: Chronic   





(10) H/O sick sinus syndrome


Code(s): Z86.79 - PERSONAL HISTORY OF OTHER DISEASES OF THE CIRCULATORY SYSTEM 

  Status: Chronic   





(11) HTN (hypertension)


Code(s): I10 - ESSENTIAL (PRIMARY) HYPERTENSION   Status: Chronic   


Qualifiers: 


   Hypertension type: essential hypertension   Qualified Code(s): I10 - 

Essential (primary) hypertension   





(12) Morbid obesity


Code(s): E66.01 - MORBID (SEVERE) OBESITY DUE TO EXCESS CALORIES   Status: 

Chronic   





(13) WILMA (obstructive sleep apnea)


Code(s): G47.33 - OBSTRUCTIVE SLEEP APNEA (ADULT) (PEDIATRIC)   Status: Chronic

   





(14) Osteoarthritis


Code(s): M19.90 - UNSPECIFIED OSTEOARTHRITIS, UNSPECIFIED SITE   Status: 

Chronic   


Qualifiers: 


   Osteoarthritis location: hip   Osteoarthritis type: primary   Laterality: 

bilateral   Qualified Code(s): M16.0 - Bilateral primary osteoarthritis of hip 

  





- Plan


cont current plan of care, plan discussed w/ family, continue antibiotics, PT/OT

, , respiratory therapy





* suspecting positive blood culture likely contaminant as pt does not appear 

sick and no elevated wbc count and no fever


* ID consulted for opinion


* discussed with wife


* medication reviewed as below


* symptomatic treatment


* continue meanwhile cefepime


* continue bipap


* social work for discharge planning


* pt is not ready for discharge yet.








Review of Systems





- Review of Systems


Constitutional: weakness.  negative: fever, chills, sweats, malaise, other


ENT: negative: Ear Pain, Ear Discharge, Nose Pain, Nose Discharge, Nose 

Congestion, Mouth Pain, Mouth Swelling, Throat Pain, Throat Swelling, Other


Respiratory: Cough, Shortness of Breath, SOB with Excertion.  negative: Dry, 

Hemoptysis, Pleuritic Pain, Sputum, Wheezing


Cardiovascular: edema.  negative: chest pain, palpitations, orthopnea, 

paroxysmal nocturnal dyspnea, light headedness, other


Gastrointestinal: negative: Nausea, Vomiting, Abdominal Pain, Diarrhea, 

Constipation, Melena, Hematochezia, Other


Genitourinary: negative: Dysuria, Frequency, Incontinence, Hematuria, Retention

, Other


Musculoskeletal: negative: Neck Pain, Shoulder Pain, Arm Pain, Back Pain, Hand 

Pain, Leg Pain, Foot Pain, Other


Skin: negative: Rash, Lesions, Jason, Bruising, Other





- Medications/Allergies


Allergies/Adverse Reactions: 


 Allergies











Allergy/AdvReac Type Severity Reaction Status Date / Time


 


No Known Drug Allergies Allergy   Verified 02/26/18 14:53











Medications: 


 Current Medications





Acetaminophen (Tylenol)  650 mg PO Q4H PRN


   PRN Reason: Headache/Fever or Pain


   Last Admin: 03/28/18 14:26 Dose:  650 mg


Hydrocodone Bitart/Acetaminophen (Norco 5/325)  1 tab PO Q4H PRN


   PRN Reason: Moderate Pain (4-6)


   Last Admin: 03/29/18 22:39 Dose:  1 tab


Albuterol Sulfate (Ventolin)  2.5 mg NEB [id number] PRN


   PRN Reason: SOB &/or Wheezing


Albuterol/Ipratropium (Duoneb)  3 ml NEB [id number]


   Last Admin: 03/30/18 10:26 Dose:  3 ml


Amlodipine Besylate (Norvasc)  5 mg PO DAILY [facility]


   Last Admin: 03/30/18 08:26 Dose:  5 mg


Aspirin (Ecotrin)  81 mg PO DAILY [facility]


   Last Admin: 03/30/18 08:26 Dose:  81 mg


Atorvastatin Calcium (Lipitor)  10 mg PO [facility]


   Last Admin: 03/29/18 21:03 Dose:  10 mg


Bisacodyl (Dulcolax)  10 mg LA DAILY PRN


   PRN Reason: Constipation


Bismuth Subsalicylate (Pepto Bismol)  2 tab PO Q1H PRN


   PRN Reason: Diarrhea/Loose Stools


   Last Admin: 03/27/18 03:15 Dose:  2 tab


Carvedilol (Coreg)  6.25 mg PO BID [facility]


   Last Admin: 03/30/18 08:25 Dose:  6.25 mg


Docusate Sodium (Colace)  100 mg PO DAILY PRN


   PRN Reason: Constipation


Dutasteride (Avodart)  0.5 mg PO DAILY [facility]


   Last Admin: 03/30/18 08:24 Dose:  0.5 mg


Enoxaparin Sodium (Lovenox)  40 mg SC 0900 [facility]


   Last Admin: 03/30/18 08:24 Dose:  40 mg


Ferrous Sulfate (Feosol)  325 mg PO BID-[facility]


   Last Admin: 03/30/18 08:24 Dose:  325 mg


Finasteride (Proscar)  5 mg PO DAILY [facility]


   Last Admin: 03/30/18 08:25 Dose:  5 mg


Furosemide (Lasix)  40 mg PO DAILY-AC [facility]


   Last Admin: 03/30/18 08:26 Dose:  40 mg


Guaifenesin (Mucinex)  1,200 mg PO Q12HR PRN


   PRN Reason: Cough


Cefepime HCl 2 gm/ Syringe 2.5 (ml/ Sodium Chloride)  12.5 mls @ 150 mls/hr 

SLOW IVP Q12HR [facility]


   Last Admin: 03/30/18 08:30 Dose:  12.5 mls


Vancomycin HCl 1.75 gm/ Sodium (Chloride)  500 mls @ 250 mls/hr IVPB 1200,2359 

[facility]


   Last Admin: 03/30/18 12:01 Dose:  500 mls


Iron/Minerals/Multivitamins (Theragran M)  1 tab PO DAILY [facility]


   Last Admin: 03/30/18 08:24 Dose:  1 tab


Lisinopril (Zestril)  10 mg PO [facility]


   Last Admin: 03/29/18 21:03 Dose:  10 mg


Miscellaneous Medication (Pharmacy To Dose)  1 each IVPB PRN PRN


   PRN Reason: Pharmacy to dose


Mometasone Furoate/Formoterol Fumar (Dulera 200 Mcg/5 Mcg Inhaler)  2 puff INH 

BID-RT [facility]


   Last Admin: 03/30/18 06:07 Dose:  2 puff


Ondansetron HCl (Zofran)  4 mg IVP Q6H PRN


   PRN Reason: Nausea/Vomiting


Pantoprazole Sodium (Protonix)  40 mg PO BID [facility]


   Last Admin: 03/30/18 08:24 Dose:  40 mg


Sodium Chloride (Flush - Normal Saline)  10 ml IVF Q12HR [facility]


   Last Admin: 03/30/18 08:27 Dose:  10 ml


Sodium Chloride (Flush - Normal Saline)  10 ml IVF PRN PRN


   PRN Reason: Saline Flush


Tamsulosin HCl (Flomax)  0.4 mg PO DAILY [facility]


   Last Admin: 03/30/18 08:26 Dose:  0.4 mg


Trazodone HCl (Desyrel)  50 mg PO HS PRN


   PRN Reason: Insomnia


   Last Admin: 03/29/18 22:39 Dose:  50 mg

## 2018-03-31 LAB
ANION GAP SERPL CALC-SCNC: 7 MMOL/L (ref 10–20)
BUN SERPL-MCNC: 22 MG/DL (ref 8.4–25.7)
CALCIUM SERPL-MCNC: 7.8 MG/DL (ref 7.8–10.44)
CHLORIDE SERPL-SCNC: 97 MMOL/L (ref 98–107)
CO2 SERPL-SCNC: 39 MMOL/L (ref 23–31)
CREAT CL PREDICTED SERPL C-G-VRATE: 164 ML/MIN (ref 70–130)
GLUCOSE SERPL-MCNC: 95 MG/DL (ref 80–115)
POTASSIUM SERPL-SCNC: 3.7 MMOL/L (ref 3.5–5.1)
SODIUM SERPL-SCNC: 139 MMOL/L (ref 136–145)

## 2018-03-31 RX ADMIN — MOMETASONE FUROATE AND FORMOTEROL FUMARATE DIHYDRATE SCH PUFF: 200; 5 AEROSOL RESPIRATORY (INHALATION) at 07:22

## 2018-03-31 RX ADMIN — MOMETASONE FUROATE AND FORMOTEROL FUMARATE DIHYDRATE SCH PUFF: 200; 5 AEROSOL RESPIRATORY (INHALATION) at 18:47

## 2018-03-31 RX ADMIN — VANCOMYCIN HYDROCHLORIDE SCH MLS: 1 INJECTION, POWDER, LYOPHILIZED, FOR SOLUTION INTRAVENOUS at 23:28

## 2018-03-31 RX ADMIN — Medication SCH ML: at 21:05

## 2018-03-31 RX ADMIN — HYDROCODONE BITARTRATE AND ACETAMINOPHEN PRN TAB: 5; 325 TABLET ORAL at 15:02

## 2018-03-31 RX ADMIN — MULTIPLE VITAMINS W/ MINERALS TAB SCH TAB: TAB at 09:49

## 2018-03-31 RX ADMIN — Medication SCH ML: at 09:51

## 2018-03-31 RX ADMIN — HYDROCODONE BITARTRATE AND ACETAMINOPHEN PRN TAB: 5; 325 TABLET ORAL at 09:49

## 2018-03-31 RX ADMIN — ASPIRIN SCH MG: 81 TABLET ORAL at 09:49

## 2018-03-31 RX ADMIN — CEFEPIME HYDROCHLORIDE SCH MLS: 2 INJECTION, POWDER, FOR SOLUTION INTRAVENOUS at 21:04

## 2018-03-31 RX ADMIN — VANCOMYCIN HYDROCHLORIDE SCH MLS: 1 INJECTION, POWDER, LYOPHILIZED, FOR SOLUTION INTRAVENOUS at 12:05

## 2018-03-31 RX ADMIN — CEFEPIME HYDROCHLORIDE SCH MLS: 2 INJECTION, POWDER, FOR SOLUTION INTRAVENOUS at 09:54

## 2018-03-31 NOTE — PRG
DATE OF SERVICE:  03/31/2018

 

SERVICE:  Pulmonary Medicine.

 

INTERVAL HISTORY:  The patient is doing great from a respiratory standpoint.  He actually got out of 
bed into chair today.  He was resistant to moving.  When he got into the chair, he was fairly upset a
bout being over on it the entire time.  He finally got back to bed.  He is exhausted and is taking a 
deep nap.  He is still able to wake up fairly easily.

 

PHYSICAL EXAMINATION:

VITAL SIGNS:  Afebrile, pulse 60, blood pressure 130/61, respirations 18, saturation 96% on 2 liters 
nasal cannula.

GENERAL:  The patient is awake, alert, no apparent distress.

LUNGS:  Excellent air entry with no prolonged expiratory phase, wheezing, rhonchi or crackles.

HEART:  Normal rate, regular.

ABDOMEN:  Soft, nontender, nondistended.  Bowel sounds are positive.

MUSCULOSKELETAL:  No cyanosis or clubbing.  There is trace pitting in the bilateral lower extremities
.

NEUROLOGIC:  Grossly nonfocal.

 

LABORATORY DATA:  Basic metabolic profile is essentially unremarkable.  Bicarbonate is up trending to
 39.  Urinalysis is unremarkable.  Blood cultures x2 are growing Corynebacterium species.  Repeat blo
od cultures are negative.

 

ASSESSMENT:

1.  Acute on chronic hypoxic and hypercapnic respiratory failure.

2.  Chronic diastolic heart failure, returned to euvolemia.

3.  Acute blood loss anemia following recent upper gastrointestinal bleed.

4.  Osteoarthritis of the bilateral hips, severe.

5.  Debility.

6.  Obesity hypoventilation syndrome.

7.  Obstructive sleep apnea.

8.  Bacteremia versus contaminant secondary to Corynebacterium species in 2 out of 2.

 

PLAN:  We will continue our supportive care.  We will discontinue our aggressive diuretics as the pat
ient has had a significant contraction alkalosis.  Pulmonary Critical Care will continue to follow wh
ile patient remains in this location.  Ultimately, Dr. Hammonds will decide what to do about these first
 cultures.

## 2018-03-31 NOTE — PDOC.PN
- Subjective


Encounter Start Date: 03/31/18


Encounter Start Time: 10:53





Patient seen and examined, states he has some pain in his hip and tail bone 

after having sat in his chair for more than an hour, wife at bedsdie, no other 

issues or complaints, all questions answered. 





- Objective


Resuscitation Status: 


 











Resuscitation Status           DNR:Do Not Resuscitate














Vital Signs & Weight: 


 Vital Signs (12 hours)











  Temp Pulse Resp BP BP Pulse Ox


 


 03/31/18 10:12   63  16   


 


 03/31/18 09:48     128/55 L  


 


 03/31/18 09:47   60   128/55 L  


 


 03/31/18 08:42  98.7 F  58 L  21 H    96


 


 03/31/18 07:42  98.7 F  58 L  21 H   128/55 L  96


 


 03/31/18 07:22   60  18   


 


 03/31/18 04:00  99.0 F  60  20   122/63  96


 


 03/31/18 02:34   60  16    95


 


 03/31/18 00:06  99.3 F  64  19   127/59 L  93 L








 Weight











Admit Weight                   260 lb


 


Weight                         159 lb 1.6 oz














I&O: 


 











 03/30/18 03/31/18 04/01/18





 06:59 06:59 06:59


 


Intake Total 2551 2066 


 


Output Total 2600 2275 


 


Balance -49 -209 











Result Diagrams: 


 03/29/18 04:15





 03/31/18 03:58





Phys Exam





- Physical Examination


Constitutional: NAD


obese


HEENT: PERRLA, moist MMs


Neck: no nodes, no JVD, supple


Respiratory: no wheezing, no rales, no rhonchi, clear to auscultation bilateral


Cardiovascular: RRR, no significant murmur, no rub


Gastrointestinal: soft, non-tender, no distention


Musculoskeletal: pulses present (trace), edema present





Dx/Plan


(1) Acute respiratory failure with hypoxia


Code(s): J96.01 - ACUTE RESPIRATORY FAILURE WITH HYPOXIA   Status: Acute   

Comment:     





(2) Bacteremia due to Gram-positive bacteria


Code(s): R78.81 - BACTEREMIA   Status: Acute   





(3) COPD exacerbation


Code(s): J44.1 - CHRONIC OBSTRUCTIVE PULMONARY DISEASE W (ACUTE) EXACERBATION   

Status: Acute   





(4) Anemia, normocytic normochromic


Code(s): D64.9 - ANEMIA, UNSPECIFIED   Status: Chronic   





(5) BPH (benign prostatic hyperplasia)


Code(s): N40.0 - BENIGN PROSTATIC HYPERPLASIA WITHOUT LOWER URINRY TRACT SYMP   

Status: Chronic   





(6) CAD (coronary artery disease)


Code(s): I25.10 - ATHSCL HEART DISEASE OF NATIVE CORONARY ARTERY W/O ANG PCTRS 

  Status: Chronic   Comment:     





(7) Diastolic CHF


Code(s): I50.30 - UNSPECIFIED DIASTOLIC (CONGESTIVE) HEART FAILURE   Status: 

Chronic   


Qualifiers: 


   Heart failure chronicity: chronic   Qualified Code(s): I50.32 - Chronic 

diastolic (congestive) heart failure   





(8) Morbid obesity


Code(s): E66.01 - MORBID (SEVERE) OBESITY DUE TO EXCESS CALORIES   Status: 

Chronic   





(9) WILMA (obstructive sleep apnea)


Code(s): G47.33 - OBSTRUCTIVE SLEEP APNEA (ADULT) (PEDIATRIC)   Status: Chronic

   





(10) Osteoarthritis


Code(s): M19.90 - UNSPECIFIED OSTEOARTHRITIS, UNSPECIFIED SITE   Status: 

Chronic   


Qualifiers: 


   Osteoarthritis location: hip   Osteoarthritis type: primary   Laterality: 

bilateral   Qualified Code(s): M16.0 - Bilateral primary osteoarthritis of hip 

  





- Plan





* Patient appears very rude and upset, states he'd like to leave the room and he

's been in the room for 5 days now. Will discuss with nursing staff to see if 

its possible for the patient to sit outside the hospital for about 30-40mts to 

help cheer him up and brighten his mood


* Medically continue current plan of care with no changes for now


* CCU team also following


* repeat labs in AM


* case and plan d/w patient and wife at length, they understand and agree with 

this plan

## 2018-04-01 LAB
ANION GAP SERPL CALC-SCNC: 10 MMOL/L (ref 10–20)
BUN SERPL-MCNC: 18 MG/DL (ref 8.4–25.7)
CALCIUM SERPL-MCNC: 8 MG/DL (ref 7.8–10.44)
CHLORIDE SERPL-SCNC: 101 MMOL/L (ref 98–107)
CO2 SERPL-SCNC: 30 MMOL/L (ref 23–31)
CREAT CL PREDICTED SERPL C-G-VRATE: 157 ML/MIN (ref 70–130)
GLUCOSE SERPL-MCNC: 93 MG/DL (ref 80–115)
POTASSIUM SERPL-SCNC: 3.8 MMOL/L (ref 3.5–5.1)
SODIUM SERPL-SCNC: 137 MMOL/L (ref 136–145)
VANCOMYCIN TROUGH SERPL-MCNC: 23.1 UG/ML

## 2018-04-01 RX ADMIN — HYDROCODONE BITARTRATE AND ACETAMINOPHEN PRN TAB: 5; 325 TABLET ORAL at 11:18

## 2018-04-01 RX ADMIN — HYDROCODONE BITARTRATE AND ACETAMINOPHEN PRN TAB: 5; 325 TABLET ORAL at 20:43

## 2018-04-01 RX ADMIN — MOMETASONE FUROATE AND FORMOTEROL FUMARATE DIHYDRATE SCH PUFF: 200; 5 AEROSOL RESPIRATORY (INHALATION) at 18:33

## 2018-04-01 RX ADMIN — VANCOMYCIN HYDROCHLORIDE SCH MLS: 1 INJECTION, POWDER, LYOPHILIZED, FOR SOLUTION INTRAVENOUS at 14:41

## 2018-04-01 RX ADMIN — CEFEPIME HYDROCHLORIDE SCH MLS: 2 INJECTION, POWDER, FOR SOLUTION INTRAVENOUS at 20:46

## 2018-04-01 RX ADMIN — HYDROCODONE BITARTRATE AND ACETAMINOPHEN PRN TAB: 5; 325 TABLET ORAL at 03:53

## 2018-04-01 RX ADMIN — CEFEPIME HYDROCHLORIDE SCH MLS: 2 INJECTION, POWDER, FOR SOLUTION INTRAVENOUS at 10:01

## 2018-04-01 RX ADMIN — Medication SCH ML: at 20:44

## 2018-04-01 RX ADMIN — MULTIPLE VITAMINS W/ MINERALS TAB SCH TAB: TAB at 09:57

## 2018-04-01 RX ADMIN — MOMETASONE FUROATE AND FORMOTEROL FUMARATE DIHYDRATE SCH PUFF: 200; 5 AEROSOL RESPIRATORY (INHALATION) at 06:40

## 2018-04-01 RX ADMIN — VANCOMYCIN HYDROCHLORIDE SCH: 1 INJECTION, POWDER, LYOPHILIZED, FOR SOLUTION INTRAVENOUS at 13:03

## 2018-04-01 RX ADMIN — Medication SCH ML: at 09:58

## 2018-04-01 RX ADMIN — ASPIRIN SCH MG: 81 TABLET ORAL at 09:57

## 2018-04-01 NOTE — PRG
DATE OF SERVICE:  04/01/2018

 

SERVICE:  Pulmonary Medicine.

 

INTERVAL HISTORY:  The patient is doing fine from a cardiovascular and respiratory standpoint.  He de
nies any current shortness of breath.  He is really happy about getting his oxycodone.  He indicates 
that this has been a big help to him.  Otherwise, there have been no events overnight.  He got out of
 the bed today.

 

PHYSICAL EXAMINATION:

VITAL SIGNS:  Afebrile, pulse 60, blood pressure 115/50, respirations 22, saturation 95% on 2 liters 
nasal cannula.

GENERAL:  The patient is awake, alert, no apparent distress.

LUNGS:  Decent air entry.  Dependent crackles are present.

HEART:  Normal rate, regular.

ABDOMEN:  Soft, nontender, nondistended.  Bowel sounds are positive.

MUSCULOSKELETAL:  No cyanosis or clubbing.  There is 2+ pitting in the bilateral lower extremities.

NEUROLOGIC:  Grossly nonfocal.

 

LABORATORY DATA:  Basic metabolic profile is essentially unremarkable.  Potassium 3.8, chloride 101, 
bicarbonate 30, and drastically improved, anion gap 10, creatinine 0.73 with a normal BUN.  Blood cul
tures are growing Corynebacterium.  Repeat blood cultures are negative at 48 hours.

 

ASSESSMENT:

1.  Acute on chronic hypoxic and hypercapnic respiratory failure.

2.  Chronic diastolic heart failure, return of euvolemia.

3.  Acute blood loss anemia following recent upper gastrointestinal bleed.

4.  Osteoarthritis of the bilateral hips, severe.

5.  Debility.

6.  Obesity hypoventilation syndrome.

7.  Obstructive sleep apnea, suspected.

8.  Bacteremia versus contaminant secondary to Corynebacterium.

 

PLAN:  We will continue our supportive care.  I gave him potassium.  We will continue to diurese him 
until he returns to euvolemia.  Pulmonary Critical Care will continue to follow while the patient rem
ains in this location.

## 2018-04-01 NOTE — PDOC.PN
- Subjective


Encounter Start Date: 04/01/18


Encounter Start Time: 10:31





Patient seen and examined, on new issues, all questions answered. Wife at 

bedside. 





- Objective


Resuscitation Status: 


 











Resuscitation Status           DNR:Do Not Resuscitate














Vital Signs & Weight: 


 Vital Signs (12 hours)











  Temp Pulse Resp BP BP Pulse Ox


 


 04/01/18 10:16   64  18   


 


 04/01/18 09:57   60   122/64  


 


 04/01/18 07:46  98.8 F  60  16   122/64  95


 


 04/01/18 06:41   58 L  16   


 


 04/01/18 04:00  98.6 F  59 L  19   119/57 L  95


 


 04/01/18 02:31   61  16    96


 


 04/01/18 00:16  98.4 F  59 L  20   123/62  94 L








 Weight











Admit Weight                   260 lb


 


Weight                         260 lb














I&O: 


 











 03/31/18 04/01/18 04/02/18





 06:59 06:59 06:59


 


Intake Total 2066 1870 


 


Output Total 8568 4370 


 


Balance -209 -1480 











Result Diagrams: 


 03/29/18 04:15





 04/01/18 04:36





Phys Exam





- Physical Examination


Constitutional: NAD


obese, laying in bed


HEENT: PERRLA, moist MMs, sclera anicteric


Neck: no nodes, no JVD, supple


Respiratory: no wheezing, no rales, no rhonchi


Cardiovascular: RRR, no significant murmur, no rub


Gastrointestinal: soft, non-tender, no distention


Musculoskeletal: pulses present, edema present (1+ pitting B/L LE)





Dx/Plan


(1) Acute respiratory failure with hypoxia


Code(s): J96.01 - ACUTE RESPIRATORY FAILURE WITH HYPOXIA   Status: Acute   

Comment:     





(2) Bacteremia due to Gram-positive bacteria


Code(s): R78.81 - BACTEREMIA   Status: Acute   





(3) COPD exacerbation


Code(s): J44.1 - CHRONIC OBSTRUCTIVE PULMONARY DISEASE W (ACUTE) EXACERBATION   

Status: Acute   





(4) Anemia, normocytic normochromic


Code(s): D64.9 - ANEMIA, UNSPECIFIED   Status: Chronic   





(5) BPH (benign prostatic hyperplasia)


Code(s): N40.0 - BENIGN PROSTATIC HYPERPLASIA WITHOUT LOWER URINRY TRACT SYMP   

Status: Chronic   





(6) CAD (coronary artery disease)


Code(s): I25.10 - ATHSCL HEART DISEASE OF NATIVE CORONARY ARTERY W/O ANG PCTRS 

  Status: Chronic   Comment:     





(7) Diastolic CHF


Code(s): I50.30 - UNSPECIFIED DIASTOLIC (CONGESTIVE) HEART FAILURE   Status: 

Chronic   


Qualifiers: 


   Heart failure chronicity: chronic   Qualified Code(s): I50.32 - Chronic 

diastolic (congestive) heart failure   





(8) Morbid obesity


Code(s): E66.01 - MORBID (SEVERE) OBESITY DUE TO EXCESS CALORIES   Status: 

Chronic   





(9) WILMA (obstructive sleep apnea)


Code(s): G47.33 - OBSTRUCTIVE SLEEP APNEA (ADULT) (PEDIATRIC)   Status: Chronic

   





(10) Osteoarthritis


Code(s): M19.90 - UNSPECIFIED OSTEOARTHRITIS, UNSPECIFIED SITE   Status: 

Chronic   


Qualifiers: 


   Osteoarthritis location: hip   Osteoarthritis type: primary   Laterality: 

bilateral   Qualified Code(s): M16.0 - Bilateral primary osteoarthritis of hip 

  





- Plan





* continue with PT/OT


* bipap


* abx


* patient stated he is to afraid to get into a chair and thus would prefer to 

not attempt it for now, d/w patient that there will be no reasonable recovery 

if he does not attempt to stand and be more mobile.


* No other changes in plan for now


* case and plan d/w patient and wife at length, they understand and agree with 

this plan

## 2018-04-02 LAB
ANION GAP SERPL CALC-SCNC: 7 MMOL/L (ref 10–20)
BUN SERPL-MCNC: 19 MG/DL (ref 8.4–25.7)
CALCIUM SERPL-MCNC: 8.1 MG/DL (ref 7.8–10.44)
CHLORIDE SERPL-SCNC: 101 MMOL/L (ref 98–107)
CO2 SERPL-SCNC: 33 MMOL/L (ref 23–31)
CREAT CL PREDICTED SERPL C-G-VRATE: 141 ML/MIN (ref 70–130)
GLUCOSE SERPL-MCNC: 95 MG/DL (ref 80–115)
POTASSIUM SERPL-SCNC: 3.8 MMOL/L (ref 3.5–5.1)
SODIUM SERPL-SCNC: 137 MMOL/L (ref 136–145)

## 2018-04-02 RX ADMIN — MOMETASONE FUROATE AND FORMOTEROL FUMARATE DIHYDRATE SCH PUFF: 200; 5 AEROSOL RESPIRATORY (INHALATION) at 08:14

## 2018-04-02 RX ADMIN — Medication SCH ML: at 20:04

## 2018-04-02 RX ADMIN — ASPIRIN SCH MG: 81 TABLET ORAL at 08:39

## 2018-04-02 RX ADMIN — MOMETASONE FUROATE AND FORMOTEROL FUMARATE DIHYDRATE SCH PUFF: 200; 5 AEROSOL RESPIRATORY (INHALATION) at 18:39

## 2018-04-02 RX ADMIN — Medication SCH ML: at 08:44

## 2018-04-02 RX ADMIN — CEFEPIME HYDROCHLORIDE SCH MLS: 2 INJECTION, POWDER, FOR SOLUTION INTRAVENOUS at 08:43

## 2018-04-02 RX ADMIN — VANCOMYCIN HYDROCHLORIDE SCH MLS: 1 INJECTION, POWDER, LYOPHILIZED, FOR SOLUTION INTRAVENOUS at 02:05

## 2018-04-02 RX ADMIN — MULTIPLE VITAMINS W/ MINERALS TAB SCH TAB: TAB at 08:39

## 2018-04-02 RX ADMIN — HYDROCODONE BITARTRATE AND ACETAMINOPHEN PRN TAB: 5; 325 TABLET ORAL at 07:39

## 2018-04-02 RX ADMIN — VANCOMYCIN HYDROCHLORIDE SCH MLS: 1 INJECTION, POWDER, LYOPHILIZED, FOR SOLUTION INTRAVENOUS at 14:16

## 2018-04-02 RX ADMIN — HYDROCODONE BITARTRATE AND ACETAMINOPHEN PRN TAB: 5; 325 TABLET ORAL at 20:00

## 2018-04-02 NOTE — ULT
LEFT LOWER EXTREMITY VENOUS DUPLEX EXAM:

 

History: Left lower extremity pain and swelling. 

 

FINDINGS: 

Real-time color doppler evaluation of the left lower extremity was performed from groin to calf. This
 includes the evaluation of the common femoral, superficial and profunda femoral, saphenous and popli
teal and trifurcation veins. These show a patent deep venous system. There is normal compressibility 
and augmentation. There is no evidence of DVT. 

 

IMPRESSION: 

No evidence of DVT of the left lower extremity. 

 

POS: [facility]

## 2018-04-02 NOTE — PDOC.PN
- Subjective


Encounter Start Date: 04/02/18


Encounter Start Time: 16:10


Subjective: f/u for acute/chronic hypoxic resp failure with current O2 2L/min 

NC.


-: Nocturnal CPAP intermittently. Overall feels ok except very weak.





- Objective


Resuscitation Status: 


 











Resuscitation Status           DNR:Do Not Resuscitate














MAR Reviewed: Yes


Vital Signs & Weight: 


 Vital Signs (12 hours)











  Temp Pulse Pulse Resp BP BP Pulse Ox


 


 04/02/18 14:25   60   14    98


 


 04/02/18 12:06  97.6 F  60   20   118/53 L  95


 


 04/02/18 10:11   60   16    96


 


 04/02/18 09:06    60   124/55 L  


 


 04/02/18 08:13  97.6 F  61   20   127/59 L  95


 


 04/02/18 08:00  97.6 F  60   14    95














  Pulse Ox


 


 04/02/18 14:25 


 


 04/02/18 12:06 


 


 04/02/18 10:11 


 


 04/02/18 09:06  95


 


 04/02/18 08:13 


 


 04/02/18 08:00 








 Weight











Admit Weight                   260 lb


 


Weight                         259 lb 4.218 oz














I&O: 


 











 04/01/18 04/02/18 04/03/18





 06:59 06:59 06:59


 


Intake Total 1870 2340 


 


Output Total 3350 2900 


 


Balance -1480 -560 











Result Diagrams: 


 03/29/18 04:15





 04/02/18 04:32


Radiology Reviewed by me: Yes (LLE dopp = negative for DVT)


EKG Reviewed by me: Yes (Tele - A-paced)





Phys Exam





- Physical Examination


Constitutional: NAD


HEENT: PERRLA, oral pharynx no lesions


Neck: no JVD, supple


few scattered rhonchi


Respiratory: no wheezing


Cardiovascular: RRR


Gastrointestinal: soft, non-tender, no distention, positive bowel sounds


LE chronic edema


Musculoskeletal: pulses present, edema present


Neurological: normal sensation, moves all 4 limbs


Skin: normal turgor, cap refill <2 seconds





Dx/Plan


(1) Acute on chronic respiratory failure with hypoxia and hypercapnia


Code(s): J96.21 - ACUTE AND CHRONIC RESPIRATORY FAILURE WITH HYPOXIA; J96.22 - 

ACUTE AND CHRONIC RESPIRATORY FAILURE WITH HYPERCAPNIA   Status: Acute   Comment

: Stable on current O2 @ 2L/min NC, continue supportive measures   





(2) Bacteremia due to Gram-positive bacteria


Code(s): R78.81 - BACTEREMIA   Status: Acute   Comment: Suspected skin 

contaminant with Corynebacterium, repeat blood cx neg x 2, d/c abx, check 2D 

echo to r/o vegetations   





(3) Diastolic CHF


Code(s): I50.30 - UNSPECIFIED DIASTOLIC (CONGESTIVE) HEART FAILURE   Status: 

Chronic   


Qualifiers: 


   Heart failure chronicity: chronic   Qualified Code(s): I50.32 - Chronic 

diastolic (congestive) heart failure   


Comment: Repeat Echo to r/o vegetations, if negative likely d/c to SNF   





(4) HTN (hypertension)


Code(s): I10 - ESSENTIAL (PRIMARY) HYPERTENSION   Status: Chronic   


Qualifiers: 


   Hypertension type: essential hypertension   Qualified Code(s): I10 - 

Essential (primary) hypertension   


Comment: Stable, continue currrent regimen   





(5) WILMA (obstructive sleep apnea)


Code(s): G47.33 - OBSTRUCTIVE SLEEP APNEA (ADULT) (PEDIATRIC)   Status: Chronic

   Comment: Nocturnal CPAP   





- Plan


plan discussed w/ family, PT/OT, , respiratory therapy, out of 

bed/ambulate, DVT proph w/SCDs


Stable overall


-: Nocturnal CPAP


-: D/C Cefepime and Vancomycin


-: Repeat 2D echo


-: CM for SNF options





* Likely d/c in 24h

## 2018-04-02 NOTE — PRG
DATE OF SERVICE:  04/02/2018

 

SERVICE:  Pulmonary Medicine.

 

INTERVAL HISTORY:  The patient is doing fine from a respiratory standpoint.  He is breathing comforta
ramona.  There has been no interval change to his condition.  He denies any current chest pain or shortn
ess of breath.  He was able to get up yesterday to the side of the bed.  He is hoping to get up at 
[facility] twice today.  He is going to continue working with physical therapy moving forward.

 

PHYSICAL EXAMINATION:

VITAL SIGNS:  Afebrile, pulse 61, blood pressure 127/59, respirations 20, saturation 95% on 2 liters 
nasal cannula.

GENERAL:  The patient is awake, alert, no apparent distress.

LUNGS:  Decent air entry.  There is no prolonged expiratory phase, wheezing, rhonchi, or crackles pre
sent.

HEART:  Normal rate and regular.

ABDOMEN:  Soft, nontender, nondistended.  Bowel sounds are positive.

MUSCULOSKELETAL:  No cyanosis or clubbing.  There is trace pitting in the right lower extremity and 2
+ pitting in the left lower extremity.

GENITOURINARY:  No Sommer.

NEUROLOGIC:  Grossly nonfocal.

 

LABORATORY DATA:  Basic metabolic profile is essentially unremarkable.  Repeat blood cultures remain 
negative.  The original blood cultures were growing Corynebacterium species in 2/2.

 

ASSESSMENT:

1.  Acute on chronic hypoxic and hypercapnic respiratory failure.

2.  Chronic diastolic heart failure, near euvolemia.

3.  Acute blood loss anemia following recent upper gastrointestinal bleed.

4.  Osteoarthritis of the bilateral hips, severe.

5.  Debility.

6.  Obese hypoventilation syndrome.

7.  Obstructive sleep apnea, suspected.

8.  Bacteremia versus contaminant with Corynebacterium.

 

PLAN:  We will continue supportive care and work on mobilization efforts.  Once we have a plan moving
 forward from an ID perspective, the patient will be ready for transition out of the hospital.  Pulmo
nary will continue to follow in this location.

## 2018-04-03 VITALS — TEMPERATURE: 98.9 F

## 2018-04-03 VITALS — SYSTOLIC BLOOD PRESSURE: 110 MMHG | DIASTOLIC BLOOD PRESSURE: 52 MMHG

## 2018-04-03 RX ADMIN — ASPIRIN SCH MG: 81 TABLET ORAL at 08:54

## 2018-04-03 RX ADMIN — Medication SCH ML: at 08:56

## 2018-04-03 RX ADMIN — MULTIPLE VITAMINS W/ MINERALS TAB SCH TAB: TAB at 08:56

## 2018-04-03 RX ADMIN — MOMETASONE FUROATE AND FORMOTEROL FUMARATE DIHYDRATE SCH PUFF: 200; 5 AEROSOL RESPIRATORY (INHALATION) at 08:03

## 2018-04-03 NOTE — PRG
DATE OF SERVICE:  04/03/2018

 

SERVICE:  Pulmonary Medicine

 

INTERVAL HISTORY:  The patient is doing outstanding from a cardiovascular and respiratory standpoint.
  He denies any chest pain, nausea, vomiting, fevers, chills or shortness of breath.  He is working w
ith physical therapy to the best of his ability.  Otherwise, there has been no interval change to his
 condition.  We are waiting final plan and recommendations from Infectious Disease.

 

PHYSICAL EXAMINATION:

VITAL SIGNS:  Afebrile, pulse 62, blood pressure 137/78, respirations 16, saturation 95% on 2 liters 
nasal cannula.

GENERAL:  The patient is awake, alert, in no apparent distress.

LUNGS:  Decent air entry bilaterally without wheezing or rhonchi or crackles present.

HEART:  Normal rate, regular.

ABDOMEN:  Soft, nontender, nondistended.  Bowel sounds are positive.

MUSCULOSKELETAL:  No cyanosis or clubbing.  There is 2+ pitting in the left lower extremity and 1+ pi
tting in the right lower extremity.

GENITOURINARY:  No Sommer.

NEUROLOGIC:  Grossly nonfocal.

 

LABORATORY DATA:  Blood cultures were originally growing Corynebacterium in 2 out of 2.  Repeat blood
 cultures remain negative to date.

 

IMAGING:  Ultrasound of the left lower extremity demonstrates no evidence of a DVT.

 

ASSESSMENT:

1.  Acute on chronic hypoxic and hypercapnic respiratory failure.

2.  Chronic diastolic heart failure, near euvolemia.

3.  Acute blood loss anemia following recent upper gastrointestinal bleed.

4.  Osteoarthritis of the bilateral hips.

5.  Debility.

6.  Obesity hypoventilation syndrome.

7.  Obstructive sleep apnea, suspected.

8.  Bacteremia versus contaminant.  

 

DISCUSSION AND PLAN:  We will continue our mobilization efforts.  Pulmonary Critical Care will contin
indu to follow while he remains in this location, but once we come up with a plan with Infectious Disepattie berg, he will be prepared for transition out of the hospital.  If he is going to use his BiPAP at night
, we can get him out on that machine.  If he will not, I have absolutely no plan on setting this ther
apy up.

## 2018-04-03 NOTE — DIS
DATE OF ADMISSION:  03/26/2018

 

DATE OF DISCHARGE:  04/03/2018

 

DISCHARGE DIAGNOSES:

1.  Acute on chronic hypoxic hypercapnic respiratory failure.

2.  Bacteremia with Corynebacterium, skin contaminant.

3.  Diastolic heart failure with ejection fraction of 50%-55%.

4.  Hypertension, stable.

5.  Obstructive sleep apnea with nocturnal BiPAP.

6.  Acute encephalopathy, likely metabolic, resolved.

7.  Severe muscle deconditioning.

8.  Sick sinus syndrome, status post pacemaker placement.

9.  Morbid obesity.

 

CONSULTATIONS:  Dr. Guerra with Pulmonology Service.

 

PERTINENT LABORATORY AND X-RAY FINDINGS:  Potassium ranged between 3.3-4.2, lactic acid level 1.8, ph
osphorus 2.8, magnesium 1.9.  .  CBC showed a hemoglobin range between 10.0-11.6.  Urine cultu
re dated 03/26/2018 showed no growth at 36 hours.  Blood culture x2, 03/26/2018, showed presumptive C
orynebacterium species.  Repeat blood cultures dated 03/29/2018 showed no growth at 5 days.  Portable
 chest x-ray dated 03/26/2018 showed stable opacity in the right lung base likely pleural effusion.  
Left lower extremity venous Doppler study dated 04/02/2018 showed no evidence for DVT.

 

HOSPITAL COURSE:  The patient was initially admitted to the intermediate care unit with increased stephanie
rtness of breath in the context of known chronic hypoxemic respiratory failure.  The patient was plac
ed on BiPAP, noninvasive mechanical ventilation, and given aggressive pulmonary supportive measures. 
 The patient was also given IV Lasix due to volume overload with increased lower extremity edema; how
ever, 2D transthoracic echocardiogram previously in 02/2018 showed a preserved ejection fraction in t
he 50%-55% range.  The patient continued to receive aggressive pulmonary supportive measures and over
all mental status had improved with oxygen supplementation, IV Lasix, and general supportive measures
.  The patient did transition with BiPAP nocturnally as well as continuing on IV antibiotic therapy e
mpirically after the patient was suspected for possible pneumonia.  The patient received vancomycin a
nd cefepime and blood cultures x2 showed Corynebacterium species.  Repeat cultures on 03/29/2018 show
ed no evidence of infectious process.  The patient was subsequently discontinued on IV antibiotic the
rapy after the Corynebacterium species were considered skin contaminant.  The patient underwent repea
t 2D transthoracic echocardiogram ruling out further vegetations or evidence of endocarditis.  Due to
 the patient's severe deconditioning, the patient was deemed appropriate candidate to return to skill
ed nursing care at [facility] after discharge.  I have examined the patient at the time of discharge a
nd reviewed disposition and goals of care on discharge and transition to skilled care.  The patient i
s in agreement to pursue skilled nursing care with ongoing physical, occupational, and speech therapy
.  Overall, the patient clinically stable currently and ready for discharge on 04/03/2018.

 

DISCHARGE MEDICATIONS:

1.  Norvasc 5 mg 1 tab p.o. daily.

2.  Enteric coated aspirin 81 mg 1 tab p.o. daily.

3.  Dulcolax 10 mg per rectum daily p.r.n.

4.  Coreg 6.25 mg p.o. b.i.d.

5.  Dulcolax 100 mg p.o. daily p.r.n.

6.  Avodart 0.5 mg p.o. daily.

7.  Lovenox 40 mg subcutaneously daily.

8.  Feosol 325 mg p.o. b.i.d.

9.  Proscar 5 mg p.o. daily.

10.  Lasix 40 mg 1 tab p.o. daily.

11.  Robitussin DM 15 mL p.o. q.4 hours p.r.n.

12.  Mucinex ER 1200 mg p.o. b.i.d.

13.  DuoNeb 3 mL nebulized q.4 hours p.r.n.

14.  Lisinopril 10 mg p.o. at bedtime.

15.  Dulera 2 puffs inhaled b.i.d.

16.  Multivitamin 1 tab p.o. daily.

17.  Protonix 40 mg p.o. b.i.d.

18.  Zocor 20 mg p.o. daily.

19.  Flomax 0.4 mg p.o. daily.

 

FOLLOWUP:  The patient may follow up with his primary care provider, Dr. David Gan, within 7-10 da
ys after discharge.

 

CONDITION ON DISCHARGE:  Fair.

 

ACTIVITY:  Ad-capo, rolling walker with contact guard assistance and high fall risk precautions.

 

DIET:  ADA.

 

SPECIAL INSTRUCTIONS:  Nocturnal BiPAP at 15/5, FiO2 30%, oxygen at 2-3 liters per minute by nasal ca
nnula continuously.

 

CODE STATUS:  DO NOT RESUSCITATE.

 

DISPOSITION:  Discharged to [facility] on 04/03/2018.

 

Total time preparing and coordinating discharge 38 minutes.

## 2018-04-26 NOTE — EKG
Test Reason : 

Blood Pressure : ***/*** mmHG

Vent. Rate : 061 BPM     Atrial Rate : 061 BPM

   P-R Int : 176 ms          QRS Dur : 146 ms

    QT Int : 422 ms       P-R-T Axes : 001 -79 -10 degrees

   QTc Int : 424 ms

 

Electronic atrial pacemaker

Right bundle branch block

Left anterior fascicular block

*** Bifascicular block ***

Possible Lateral infarct , age undetermined

Abnormal ECG

 

Confirmed by ROCKY PRATER ([id number]),  CLEMENT RAMOS ([id number]) on 4/26/2018 3:03:55 PM

 

Referred By:             Confirmed By:ROCKY PRATER

## 2018-06-26 ENCOUNTER — HOSPITAL ENCOUNTER (INPATIENT)
Dept: HOSPITAL 92 - ERS | Age: 73
LOS: 5 days | Discharge: NURSING, REHAB OR LONG-TERM CARE | DRG: 698 | End: 2018-07-01
Attending: INTERNAL MEDICINE | Admitting: INTERNAL MEDICINE
Payer: MEDICARE

## 2018-06-26 VITALS — BODY MASS INDEX: 38.4 KG/M2
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
## 2018-06-26 DIAGNOSIS — G89.29: ICD-10-CM

## 2018-06-26 DIAGNOSIS — Z95.0: ICD-10-CM

## 2018-06-26 DIAGNOSIS — J96.21: ICD-10-CM

## 2018-06-26 DIAGNOSIS — N17.9: ICD-10-CM

## 2018-06-26 DIAGNOSIS — I50.32: ICD-10-CM

## 2018-06-26 DIAGNOSIS — Z51.5: ICD-10-CM

## 2018-06-26 DIAGNOSIS — E66.01: ICD-10-CM

## 2018-06-26 DIAGNOSIS — Z79.899: ICD-10-CM

## 2018-06-26 DIAGNOSIS — A41.51: ICD-10-CM

## 2018-06-26 DIAGNOSIS — Z66: ICD-10-CM

## 2018-06-26 DIAGNOSIS — E78.5: ICD-10-CM

## 2018-06-26 DIAGNOSIS — I11.0: ICD-10-CM

## 2018-06-26 DIAGNOSIS — M54.9: ICD-10-CM

## 2018-06-26 DIAGNOSIS — Z87.11: ICD-10-CM

## 2018-06-26 DIAGNOSIS — Z79.82: ICD-10-CM

## 2018-06-26 DIAGNOSIS — M19.90: ICD-10-CM

## 2018-06-26 DIAGNOSIS — J96.22: ICD-10-CM

## 2018-06-26 DIAGNOSIS — Z87.891: ICD-10-CM

## 2018-06-26 DIAGNOSIS — R33.9: ICD-10-CM

## 2018-06-26 DIAGNOSIS — T83.511A: Primary | ICD-10-CM

## 2018-06-26 DIAGNOSIS — J44.9: ICD-10-CM

## 2018-06-26 DIAGNOSIS — G47.33: ICD-10-CM

## 2018-06-26 DIAGNOSIS — N39.0: ICD-10-CM

## 2018-06-26 LAB
ALBUMIN SERPL BCG-MCNC: 2.9 G/DL (ref 3.4–4.8)
ALP SERPL-CCNC: 55 U/L (ref 40–150)
ALT SERPL W P-5'-P-CCNC: 7 U/L (ref 8–55)
ANALYZER IN CARDIO: (no result)
ANION GAP SERPL CALC-SCNC: 12 MMOL/L (ref 10–20)
AST SERPL-CCNC: 12 U/L (ref 5–34)
BACTERIA UR QL AUTO: (no result) HPF
BASE EXCESS STD BLDA CALC-SCNC: 4.4 MEQ/L
BASOPHILS # BLD AUTO: 0 THOU/UL (ref 0–0.2)
BASOPHILS NFR BLD AUTO: 0 % (ref 0–1)
BILIRUB SERPL-MCNC: 0.6 MG/DL (ref 0.2–1.2)
BUN SERPL-MCNC: 49 MG/DL (ref 8.4–25.7)
CA-I BLDA-SCNC: 1.1 MMOL/L (ref 1.12–1.3)
CALCIUM SERPL-MCNC: 8.1 MG/DL (ref 7.8–10.44)
CHLORIDE SERPL-SCNC: 99 MMOL/L (ref 98–107)
CK MB SERPL-MCNC: 2.8 NG/ML (ref 0–6.6)
CO2 SERPL-SCNC: 27 MMOL/L (ref 23–31)
CREAT CL PREDICTED SERPL C-G-VRATE: 0 ML/MIN (ref 70–130)
CRYSTAL-AUWI FLAG: 32 (ref 0–15)
CRYSTALS URNS QL MICRO: (no result) HPF
EOSINOPHIL # BLD AUTO: 0 THOU/UL (ref 0–0.7)
EOSINOPHIL NFR BLD AUTO: 0 % (ref 0–10)
GLOBULIN SER CALC-MCNC: 2.6 G/DL (ref 2.4–3.5)
GLUCOSE SERPL-MCNC: 116 MG/DL (ref 80–115)
HCO3 BLDA-SCNC: 31 MEQ/L (ref 22–28)
HCT VFR BLDA CALC: 34.2 % (ref 42–52)
HEV IGM SER QL: 0.3 (ref 0–7.99)
HGB BLD-MCNC: 10.7 G/DL (ref 14–18)
HGB BLDA-MCNC: 10.9 G/DL (ref 14–18)
HYALINE CASTS #/AREA URNS LPF: (no result) LPF
LIPASE SERPL-CCNC: 10 U/L (ref 8–78)
LYMPHOCYTES # BLD: 0.4 THOU/UL (ref 1.2–3.4)
LYMPHOCYTES NFR BLD AUTO: 3.7 % (ref 21–51)
MAGNESIUM SERPL-MCNC: 1.8 MG/DL (ref 1.6–2.6)
MANUAL MICROSCOPIC REVIEWED?: (no result)
MCH RBC QN AUTO: 31.6 PG (ref 27–31)
MCV RBC AUTO: 92.4 FL (ref 78–98)
MONOCYTES # BLD AUTO: 0.7 THOU/UL (ref 0.11–0.59)
MONOCYTES NFR BLD AUTO: 6.5 % (ref 0–10)
NEUTROPHILS # BLD AUTO: 10 THOU/UL (ref 1.4–6.5)
NEUTROPHILS NFR BLD AUTO: 89.8 % (ref 42–75)
O2 A-A PPRESDIFF RESPIRATORY: 135.22 MM[HG] (ref 0–20)
PATHC CAST-AUWI FLAG: 3.52 (ref 0–2.49)
PCO2 BLDA: 56.3 MMHG (ref 35–45)
PH BLDA: 7.36 [PH] (ref 7.35–7.45)
PLATELET # BLD AUTO: 143 THOU/UL (ref 130–400)
PO2 BLDA: 79.6 MMHG (ref 70–?)
POTASSIUM SERPL-SCNC: 4.2 MMOL/L (ref 3.5–5.1)
PROT UR STRIP.AUTO-MCNC: 100 MG/DL
RBC # BLD AUTO: 3.38 MILL/UL (ref 4.7–6.1)
RBC UR QL AUTO: (no result) HPF (ref 0–3)
SODIUM SERPL-SCNC: 134 MMOL/L (ref 136–145)
SP GR UR STRIP: 1.01 (ref 1–1.04)
SPECIMEN DRAWN FROM PATIENT: (no result)
SPERM-AUWI FLAG: 0 (ref 0–9.9)
TROPONIN I SERPL DL<=0.01 NG/ML-MCNC: 0.05 NG/ML (ref ?–0.03)
TROPONIN I SERPL DL<=0.01 NG/ML-MCNC: 0.05 NG/ML (ref ?–0.03)
TROPONIN I SERPL DL<=0.01 NG/ML-MCNC: 0.06 NG/ML (ref ?–0.03)
WBC # BLD AUTO: 11.1 THOU/UL (ref 4.8–10.8)
YEAST-AUWI FLAG: 47.9 (ref 0–25)

## 2018-06-26 PROCEDURE — 82553 CREATINE MB FRACTION: CPT

## 2018-06-26 PROCEDURE — C1751 CATH, INF, PER/CENT/MIDLINE: HCPCS

## 2018-06-26 PROCEDURE — 83880 ASSAY OF NATRIURETIC PEPTIDE: CPT

## 2018-06-26 PROCEDURE — 87040 BLOOD CULTURE FOR BACTERIA: CPT

## 2018-06-26 PROCEDURE — 81015 MICROSCOPIC EXAM OF URINE: CPT

## 2018-06-26 PROCEDURE — 84484 ASSAY OF TROPONIN QUANT: CPT

## 2018-06-26 PROCEDURE — 5A09357 ASSISTANCE WITH RESPIRATORY VENTILATION, LESS THAN 24 CONSECUTIVE HOURS, CONTINUOUS POSITIVE AIRWAY PRESSURE: ICD-10-PCS | Performed by: EMERGENCY MEDICINE

## 2018-06-26 PROCEDURE — 80048 BASIC METABOLIC PNL TOTAL CA: CPT

## 2018-06-26 PROCEDURE — 84100 ASSAY OF PHOSPHORUS: CPT

## 2018-06-26 PROCEDURE — 87077 CULTURE AEROBIC IDENTIFY: CPT

## 2018-06-26 PROCEDURE — 96361 HYDRATE IV INFUSION ADD-ON: CPT

## 2018-06-26 PROCEDURE — 82805 BLOOD GASES W/O2 SATURATION: CPT

## 2018-06-26 PROCEDURE — 81003 URINALYSIS AUTO W/O SCOPE: CPT

## 2018-06-26 PROCEDURE — 87086 URINE CULTURE/COLONY COUNT: CPT

## 2018-06-26 PROCEDURE — 36415 COLL VENOUS BLD VENIPUNCTURE: CPT

## 2018-06-26 PROCEDURE — 94640 AIRWAY INHALATION TREATMENT: CPT

## 2018-06-26 PROCEDURE — 83690 ASSAY OF LIPASE: CPT

## 2018-06-26 PROCEDURE — 83605 ASSAY OF LACTIC ACID: CPT

## 2018-06-26 PROCEDURE — 87149 DNA/RNA DIRECT PROBE: CPT

## 2018-06-26 PROCEDURE — 96367 TX/PROPH/DG ADDL SEQ IV INF: CPT

## 2018-06-26 PROCEDURE — 80053 COMPREHEN METABOLIC PANEL: CPT

## 2018-06-26 PROCEDURE — 36569 INSJ PICC 5 YR+ W/O IMAGING: CPT

## 2018-06-26 PROCEDURE — 85025 COMPLETE CBC W/AUTO DIFF WBC: CPT

## 2018-06-26 PROCEDURE — 96365 THER/PROPH/DIAG IV INF INIT: CPT

## 2018-06-26 PROCEDURE — 94660 CPAP INITIATION&MGMT: CPT

## 2018-06-26 PROCEDURE — A4216 STERILE WATER/SALINE, 10 ML: HCPCS

## 2018-06-26 PROCEDURE — 87186 SC STD MICRODIL/AGAR DIL: CPT

## 2018-06-26 PROCEDURE — 71045 X-RAY EXAM CHEST 1 VIEW: CPT

## 2018-06-26 PROCEDURE — 83735 ASSAY OF MAGNESIUM: CPT

## 2018-06-26 NOTE — PRG
DATE OF SERVICE:  06/26/2018

 

Ms. Herbert has declined to wear BiPAP since he has been admitted in the intermediate care unit.  He is 
a do-not-resuscitate patient.  He can continue to wear his home BiPAP or CPAP when he sleeps.  He cou
ld be transferred out of the Intermediate Care Unit to the medical chacon since he is a do-not-resuscit
ate patient and really is not acutely ill in my opinion.  He is bronchospastic, but appears to be imp
roved enough to where he wanted to take off his BiPAP.  He did not have anything on chest radiograph 
that would suggest he has pneumonia.  His antibiotics can be simplified.

## 2018-06-26 NOTE — RAD
FRONTAL RADIOGRAPH OF CHEST PORTABLE UPRIGHT:

 

Date:  06/26/18

 

COMPARISON:  

03/26/18. 

 

HISTORY:  

Hypoxia. 

 

FINDINGS:

Patient is rotated to the right. Inspiration is shallow. There is pulmonary vascular congestion. Ther
e is a dual lead transvenous pacing device. There is elevation of the right hemidiaphragm with focal 
opacity in the right base suggesting a combination of nonspecific pulmonary parenchymal opacity and p
leural fluid. Findings are similar when compared to the prior examination. 

 

IMPRESSION: 

No acute findings.  Persistent nonspecific increased density in the right lung base with elevation of
 right hemidiaphragm. Underlying pulmonary parenchymal opacity, pleural fluid, or mass lesion cannot 
be fully excluded. Recommend PA and lateral imaging of the chest when the patient is able. 

 

 

POS: [facility]

## 2018-06-27 LAB
ANION GAP SERPL CALC-SCNC: 13 MMOL/L (ref 10–20)
BUN SERPL-MCNC: 58 MG/DL (ref 8.4–25.7)
CALCIUM SERPL-MCNC: 7.9 MG/DL (ref 7.8–10.44)
CHLORIDE SERPL-SCNC: 99 MMOL/L (ref 98–107)
CO2 SERPL-SCNC: 27 MMOL/L (ref 23–31)
CREAT CL PREDICTED SERPL C-G-VRATE: 55 ML/MIN (ref 70–130)
GLUCOSE SERPL-MCNC: 111 MG/DL (ref 80–115)
HGB BLD-MCNC: 10.3 G/DL (ref 14–18)
MCH RBC QN AUTO: 30.7 PG (ref 27–31)
MCV RBC AUTO: 92.8 FL (ref 78–98)
MDIFF COMPLETE?: YES
PLATELET # BLD AUTO: 128 THOU/UL (ref 130–400)
POTASSIUM SERPL-SCNC: 4.4 MMOL/L (ref 3.5–5.1)
RBC # BLD AUTO: 3.36 MILL/UL (ref 4.7–6.1)
SODIUM SERPL-SCNC: 135 MMOL/L (ref 136–145)
WBC # BLD AUTO: 9.9 THOU/UL (ref 4.8–10.8)

## 2018-06-27 NOTE — RAD
SINGLE VIEW OF THE CHEST:

 

Comparison: 6-26-18

 

History: Acute on chronic respiratory failure. 

 

FINDINGS: 

Single view of the chest shows an enlarged but stable cardiomediastinal silhouette. The pacemaker is 
unchanged in position. Increased interstitial markings are present. Bilateral pulmonary vascular rimma
estion is seen. There is no evidence of consolidation, mass, or pleural effusion. 

 

IMPRESSION: 

Cardiomegaly and pulmonary vascular congestion. 

 

POS: [facility]

## 2018-06-27 NOTE — CON
DATE OF CONSULTATION:  06/26/2018

 

HISTORY OF PRESENT ILLNESS:  Mr. Herbert is a 72-year-old male who lives in 
nursing home environment.

 

I am told that his simple pulse ox check was in the mid-80s today, so he was 
transferred for further evaluation.

 

His only complaint is weakness.

 

PAST MEDICAL HISTORY:

1.  Remarkable for CHF, COPD, sleep apnea, history of nocturnal BiPAP.

2.  History of obesity.

3.  History of morbid obesity.

4.  Extreme deconditioning.

5.  History of coronary disease.

6.  History of coronary stenting.

7.  History of pacemaker.

8.  History of diabetes.

 

He was discharged from the hospital 04/03/2018 with a diagnosis of acute on 
chronic hypoxic hypercapnic respiratory failure and diastolic heart failure.

 

FAMILY HISTORY:  Negative for lung disease in early age.

 

SOCIAL HISTORY:  Nonsmoker, nondrinker.  He reports no drug allergies.  He does 
not use drugs.

 

MEDICATIONS:  At discharge on 04/03/2018, were Norvasc, aspirin, Dulcolax, Coreg
, Avodart, iron, Proscar, Lasix, Mucinex, DuoNeb, lisinopril, Dulera, Protonix, 
Zocor, and Flomax.

 

REVIEW OF SYSTEMS:  10 point system otherwise negative.

 

PHYSICAL EXAMINATION:

VITAL SIGNS:  Blood pressure 118/57, heart rate 60, respiratory rate 16.  He is 
in no distress.

HEENT:  He has full face mask BiPAP on.

NECK:  Supple.

LUNGS:  Remarkable for diffuse wheezes.

HEART:  Regular rhythm.

ABDOMEN:  Soft and nontender.

EXTREMITIES:  Without clubbing, cyanosis, or asymmetry.  He does have 2+ 
pitting edema to his knees.

 

He has an echocardiogram last admission shows diastolic dysfunction.

 

He has chest radiograph does not show any new infiltrates.

 

IMPRESSION:

1.  Acute on chronic respiratory failure with chronic hypercapnia and hypoxia.

2.  Street sleep apnea, morbid obesity with deconditioning.

 

Bronchospasm, most likely secondary to retained secretions.

 

PLAN:  Nebulizer treatments, BiPAP.  He has an out of hospital DNR and will 
probably improve to a point where his BiPAP can be switched back to just at 
bedtime.  He does not need a monitored bed.

 

I will be happy to follow with the other physician's caring for him.

 

This is a 50 minute visit with greater than 50% of the time spent on unit with 
coordination of care.
ALLIE

## 2018-06-27 NOTE — PDOC.PN
- Subjective


Encounter Start Date: 06/27/18


Encounter Start Time: 13:15





Slow to respond.  Feels like it is much effort to talk.  Has refused any mask 

type respiratory support. 





- Objective


Resuscitation Status: 


 











Resuscitation Status           DNR:Do Not Resuscitate














Vital Signs & Weight: 


 Vital Signs (12 hours)











  Temp Pulse Pulse Resp BP BP Pulse Ox


 


 06/27/18 15:23  98.6 F  60   22 H   84/42 L  95


 


 06/27/18 14:50   62   20    97


 


 06/27/18 14:00    60   88/42 L  


 


 06/27/18 11:48  98.5 F  60   20   92/43 L  95


 


 06/27/18 10:14   59 L   20    95


 


 06/27/18 08:00  99.9 F H  60   20    96


 


 06/27/18 07:38  99.9 F H  60   22 H   92/49 L  96


 


 06/27/18 07:08        96


 


 06/27/18 07:06   60   16    96


 


 06/27/18 04:10  99.2 F  62   16   98/45 L  96














  Pulse Ox


 


 06/27/18 15:23 


 


 06/27/18 14:50 


 


 06/27/18 14:00  98


 


 06/27/18 11:48 


 


 06/27/18 10:14 


 


 06/27/18 08:00 


 


 06/27/18 07:38 


 


 06/27/18 07:08 


 


 06/27/18 07:06 


 


 06/27/18 04:10 








 Weight











Weight                         244 lb 4.8 oz














I&O: 


 











 06/26/18 06/27/18 06/28/18





 06:59 06:59 06:59


 


Intake Total  450 


 


Output Total  500 


 


Balance  -50 











Result Diagrams: 


 06/27/18 03:18





 06/27/18 03:18





Phys Exam





- Physical Examination


Sitting up in bed asleep.  Awakens.  Responds slowly, but appropriately.


HEENT: oral pharynx no lesions


Neck: no JVD, supple


Diminished BS throughout.  Scattered rales. 


Cardiovascular: RRR, no significant murmur


Gastrointestinal: soft, non-tender, no distention, positive bowel sounds


Musculoskeletal: no edema


Skin: normal turgor





Dx/Plan


(1) Sepsis


Code(s): A41.9 - SEPSIS, UNSPECIFIED ORGANISM   Status: Acute   


Plan: 





PATIENT PRESENTED SEPTIC SECONDARY TO UTI, WITH A BIT OF AN ATYPICAL SEPSIS 

PICTURE.  HE BECAME HYPOTENSIVE AND BLOOD CULTURES ARE POSITIVE. WAS ON BROADER 

COVERAGE WHEN THE ORIGINAL THINKING WAS POSSIBLE PNEUMONIA.  SIMPLIFIED WHEN 

THAT NO LONGER APPEARED TO BE THE CASE.  AWAIT SENSITIVITIES.








(2) E. coli UTI


Code(s): N39.0 - URINARY TRACT INFECTION, SITE NOT SPECIFIED; B96.20 - UNSP 

ESCHERICHIA COLI AS THE CAUSE OF DISEASES CLASSD ELSWHR   Status: Acute   

Comment: 


ON ZOSYN.  CHRONIC INDWELLING GUALLPA CATHETER.  ADJUST ABX TO SENSITIVITIES.   





(3) E coli bacteremia


Code(s): R78.81 - BACTEREMIA   Status: Acute   





(4) Acute on chronic respiratory failure with hypoxia and hypercapnia


Code(s): J96.21 - ACUTE AND CHRONIC RESPIRATORY FAILURE WITH HYPOXIA; J96.22 - 

ACUTE AND CHRONIC RESPIRATORY FAILURE WITH HYPERCAPNIA   Status: Acute   Comment

: 


PULMONOLOGY FOLLOWING.  REFUSING ANY MASK DEVIDE SUPPORT.  CONTINUE NEBS, 

OXYGEN SUPPORT.   





(5) COPD exacerbation


Code(s): J44.1 - CHRONIC OBSTRUCTIVE PULMONARY DISEASE W (ACUTE) EXACERBATION   

Status: Acute   





(6) Diastolic CHF


Code(s): I50.30 - UNSPECIFIED DIASTOLIC (CONGESTIVE) HEART FAILURE   Status: 

Chronic   


Qualifiers: 


   Heart failure chronicity: chronic   Qualified Code(s): I50.32 - Chronic 

diastolic (congestive) heart failure   


Comment: HAS PULMONARY EDEMA ON CXR, BUT REQUIRING ADDITIONAL FLUIDS FOR BP 

SUPPORT.   





(7) H/O sick sinus syndrome


Code(s): Z86.79 - PERSONAL HISTORY OF OTHER DISEASES OF THE CIRCULATORY SYSTEM 

  Status: Chronic   





(8) Morbid obesity


Code(s): E66.01 - MORBID (SEVERE) OBESITY DUE TO EXCESS CALORIES   Status: 

Chronic   





(9) WILMA (obstructive sleep apnea)


Code(s): G47.33 - OBSTRUCTIVE SLEEP APNEA (ADULT) (PEDIATRIC)   Status: Chronic

   Comment: 


REFUSING CPAP.   





- Plan





* HE HAS HAD RECURRENT ISSUES AND HIS PROGNOSIS FOR OVERCOMING THE SEVERE DJD 

THAT HAS HIM SO INCAPACITATED IS UNLIKELY.


* PALLIATIVE CARE CONSULT.  DNR/DNI.

## 2018-06-27 NOTE — PRG
DATE OF SERVICE:  06/27/2018

 

SERVICE:  Pulmonary Medicine.

 

INTERVAL HISTORY:  The patient is doing okay.  Last night, he had some marginal blood pressures.  As 
such, he was not transitioned out of the IMCU.  He denies any current chest pain, nausea, vomiting, s
hortness of breath, fevers, or chills.  Urine output has been okay.  He is not having any dizziness o
r chest discomfort.

 

PHYSICAL EXAMINATION:

VITAL SIGNS:  Afebrile with T-max overnight of 100.9, pulse 60, blood pressure 92/43, respirations ar
e 20, and saturation 95% on 3.5 liters nasal cannula.

HEENT:  Normocephalic, atraumatic.  Sclerae are white, conjunctivae pink.  Oral and nasal mucosa is m
oist without lesions.

LUNGS:  Decent air entry.  Dependent crackles are minimal.

HEART:  Normal rate, regular.

ABDOMEN:  Soft, nontender, nondistended.  Bowel sounds are positive.

MUSCULOSKELETAL:  No cyanosis or clubbing.  There is trace pitting in the bilateral lower extremities
.

NEUROLOGIC:  Grossly nonfocal.

 

LABORATORY DATA:  WBC 9.9, hemoglobin 10.3, platelets 128,000.  PH 7.36, pCO2 of 56, pO2 of 79.  Crea
tinine 1.96, which is gently trending upward.  Basic metabolic profile is otherwise unremarkable.  Ur
inalysis is positive for WBC, leukocyte esterase.  Nitrites are negative.  Gram-negative kali, which t
urns out to be E. coli is growing in the blood.  Urine culture is growing E. coli and Proteus

 

IMAGING:  Chest x-ray demonstrates low lung volumes, which accentuate interstitial markings.  Possibl
e pulmonary vascular congestion is also present.

 

ASSESSMENT:

1.  Severe sepsis.

2.  Bacteremia secondary to Escherichia coli.

3.  Urinary tract infection secondary to Escherichia coli plus or minus Proteus.

4.  Severe deconditioning.

5.  Osteoarthritis, debilitating.

6.  Morbid obesity, improving.

7.  Urinary retention with chronic indwelling Sommer catheter.

 

DISCUSSION AND PLAN:  The patient remained in the IMCU for the next 12 hours.  Once his blood pressur
es firm up a touch, he can be considered for transition to the floor.  I will give him one more liter
 of fluids for soft blood pressures.  He is refusing to wear BiPAP.  As such, this will be discontinu
ed.  If it looks like he develops increasing hypoxemic failure, he can be considered for a noninvasiv
e therapy.  He remains DNI/DNR.

## 2018-06-27 NOTE — HP
CHIEF COMPLAINT:  Hypoxia.

 

HISTORY OF PRESENT ILLNESS:  This patient is a 72-year-old male who has been admitted several times p
reviously for respiratory failure.  Apparently, the patient has required intubation several times in 
his previous admissions.  He is now living in a rehab facility.  Reportedly, the patient was found to
day to be somewhat hypoxic and to potentially have some low blood pressure as well at the rehab facil
[facility].  He had an ambulance called and per report from the ER the patient was found to be on 5 liters a
nd having O2 sats in the mid 80s.  He was subsequently brought to the emergency department.  The laila
ent was apparently somewhat altered and having some difficulty giving any history at the time in the 
emergency department.  EMS had indicated he was complaining of some pain in his suprapubic and cathet
er area.  On my exam, the patient states he recalls all those events and feels like he is generally a
t his baseline at the moment.

 

REVIEW OF SYSTEMS:  The patient does admit to having some coughing with trying to drink over the last
 week or so.  He believes that he had some damage when he was hospitalized previously and was intubat
ed and that has not fully recovered.  Otherwise, a 11 system review is negative except for history of
 present illness symptoms.

 

PAST MEDICAL HISTORY:

1.  Notable for congestive heart failure, primary diastolic.

2.  Atrial flutter, pacemaker.  

3.  COPD. 

4.  History of recurrent pneumonia.

5.  Hyperlipidemia.

6.  Hypertension.

7.  Chronic back pain.

8.  History of peptic ulcer disease.

9.  Obstructive sleep apnea.

 

PAST SURGICAL HISTORY:  Endoscopy with cauterization of vessels, ablation for atrial flutter, pacemak
er placement.

 

FAMILY HISTORY:  Notable for congestive heart failure.

 

SOCIAL HISTORY:  The patient is , currently residing in a rehab facility.  Former smoker.  No 
history of alcohol or drugs.

 

ALLERGIES:  None.

 

HOME MEDICATIONS:  Guaifenesin 1200 mg p.o. q.12, Flomax 0.4 one p.o. daily, Zocor 20 mg p.o. daily. 
 Protonix 40 mg b.i.d.,  Theragran M 1 p.o. daily, Dulera 2 puffs b.i.d., lisinopril 10 mg at bedtime
, DuoNeb p.r.n.,  Lasix 40 mg every day, finasteride 5 mg every day, ferrous sulfate 325 b.i.d., Love
nox 40 subcu daily, Avodart 0.5 every day, Dulcolax p.r.n., Coreg 6.25 b.i.d., aspirin 81 mg every da
y, Norvasc 5 mg daily.

 

PHYSICAL EXAMINATION:

VITAL SIGNS:  Temperature was 99, pulse 61, respirations 18, O2 sat was 93% on 4 liters.  BP initiall
y was 113/45.  Subsequently, his systolic in the upper 70s.

GENERAL APPEARANCE:  The patient is awake and alert.  He is slightly slow to respond, but appropriate
.  He was witnessed to have some apparent aspiration on attempting to drink some water upon my entry 
to the room.  He appears oriented and appropriate.

HEENT:  PERRL.  No OP lesions.

CARDIOVASCULAR:  Regular, without murmurs.

CHEST:  Lungs are clear in the upper lobes.  He has significant upper airway noise, which somewhat cl
ears with cough.  He has some basilar rales.

ABDOMEN:  Obese, soft, nontender with no organomegaly.

SKIN:  Warm and dry without significant edema.

 

LABORATORY DATA:  White count 11.1, hemoglobin 10.7, platelets 143, blood gas with a pH of 7.36, pCO2
 56.3, pO2 of 79.6, he is on 10/5 BiPAP with 40% O2.  Sodium is 134, potassium 4.2, chloride 99, CO2 
27, BUN 49, creatinine 1.78, glucose 116, lactic acid 1.2, calcium 8.1.  Troponin 0.055, , alb
umin 2.9.  Urinalysis with large blood, large leukocyte esterase, 4-6 red cells, greater than 50 whit
e blood cells, 4+ bacteria.  

 

Chest x-ray shows persistent nonspecific increased density in the right lung base with elevation of t
he right hemidiaphragm with underlying pulmonary parenchymal opacity, pleural fluid, a mass lesion ca
nnot be fully excluded.

 

ASSESSMENT AND PLAN:

1.  Acute on chronic respiratory failure.  The patient has fairly significant chronic respiratory wicho
lure with exacerbation today requiring high dose oxygen with still low saturations in the mid 80s.  H
e initially required some BiPAP and appears to be somewhat improved at the moment.

2.  Possible aspiration.  The patient had some evidence of aspirating.  I did discuss this with his w
yana.  She reports that they have had this evaluated numerous times and that he has aspirated in the p
ast.  He has not been on any special diet recently.  We will keep him n.p.o. and ask speech therapy t
o see him.

3.  Urinary tract infection with related sepsis.  The patient does not have a history of significant 
hypotension.  He has got evidence of significant urinary tract infection and now has hypotension.  Hi
s lactic acid level was not significantly elevated.  We will try to hydrate to improve his blood pres
sure; however, have to be cautious in light of his history of congestive heart failure.  Continue wit
h vancomycin and Zosyn given the fact that he is at a healthcare-acquired facility.  We will also con
sult Pulmonary Critical Care.

4.  Renal.  The patient has evidence of prerenal azotemia suggesting that he may be dehydrated.  This
 may also be related to poor cardiac function.  At this point, he needs fluids in order to address hi
s hypotension, so we will go that route.

5.  Congestive heart failure.  The patient has a history of diastolic dysfunction.  His most recent e
chocardiogram from March revealed an EF of 60-65% with suggestion of diastolic dysfunction.  

 

DISPOSITION:  The patient has an out of facility DNR and was very clear that he would like to remain 
DNR.  His surrogate decision maker will be his wife who is present.

## 2018-06-27 NOTE — PQF
DATE:     6-27-18                                                      ATTN:  
RACHAEL ORTIZ



Please exercise your independent, professional judgment in responding to the 
clarification form. 

Clinical indicators are provided on the bottom of this form for your review



Please check appropriate box(s):

[ x] Acute Renal Failure (ARF) / Acute Kidney Injury (MAGALY)          

[  ]  Abnormal Lab Values

[  ] Other diagnosis ___________

[  ] Unable to determine



In addition, please specify:

Present on Admission (POA):  [  ] Yes             [  ] No             [  ] 
Unable to determine



[employer] Guidelines for CKD Staging

Stage I    Kidney damage with normal or increased GFR      GFR > 90

Stage II   Kidney damage with mildly decreased GFR          GFR 60-89

Stage III   Kidney damage with moderately decreased GFR     GFR 30-59

Stage IV    Kidney damage with severely decreased GFR     GFR 16-29

Stage V    Kidney failure                                                    GFR
<15

ESRD    End Stage Renal Disease                                    On dialysis

__________________________________________________________________

Acute Renal Failure/Acute Kidney Failure defined as:

Increases in SCr by (>) 0.3 mg/dl within 48 hours OR-

Increases in SCr by (>) 1.5 times baseline, known or presumed to have occurred 
within the prior 7 days OR-

Urine volume < 0.5 ml/kg/hour for 6 hours (KDIGO supplement 2012 for RIFLE/AKIN 
criteria)



For continuity of documentation, please document condition throughout progress 
notes and discharge summary.  Thank You.



CLINICAL INDICATORS - SIGNS / SYMPTOMS / LABS



H&P:   RENAL.  THE PATIENT HAS EVIDENCE OF PRERENAL AZOTEMIA SUGGESTING THAT HE 
MAY BE DEHYDRATED.



GFR:   6-26:   38

           6-27:    34



CREATININE:   6-26:   1.78

                       6-27:   1.96



BUN:   6-26:    49

           6-27:    58



BP:   ER:   82/58,  83/42,  99/40,  80/42,  81/42,  96/47

         6-26:    91/43

         6-27:    76/31,  98/45,   92/49



RISK FACTORS:   ER:  HOME LASIX, COREG, LISINOPRIL

                            H&P:   RENAL.  THE PATIENT HAS EVIDENCE OF PRERENAL 
AZOTEMIA SUGGESTING THAT

                                      HE MAY BE  DEHYDRATED.  THIS MAY ALSO BE 
RELATED TO POOR CARDIAC FUNCTION



TREATMENTS:    ER:   IVF



(This form is maintained as a part of the permanent medical record)

 2015 [employer].  All Rights Reserved

[email or web address]    Office:  [phone]

                                                              

 

ALLIE

## 2018-06-28 LAB
ANION GAP SERPL CALC-SCNC: 12 MMOL/L (ref 10–20)
BUN SERPL-MCNC: 72 MG/DL (ref 8.4–25.7)
CALCIUM SERPL-MCNC: 7.7 MG/DL (ref 7.8–10.44)
CHLORIDE SERPL-SCNC: 100 MMOL/L (ref 98–107)
CO2 SERPL-SCNC: 27 MMOL/L (ref 23–31)
CREAT CL PREDICTED SERPL C-G-VRATE: 55 ML/MIN (ref 70–130)
GLUCOSE SERPL-MCNC: 105 MG/DL (ref 80–115)
POTASSIUM SERPL-SCNC: 4.1 MMOL/L (ref 3.5–5.1)
SODIUM SERPL-SCNC: 135 MMOL/L (ref 136–145)

## 2018-06-28 RX ADMIN — Medication SCH: at 19:49

## 2018-06-28 RX ADMIN — Medication SCH ML: at 08:40

## 2018-06-28 NOTE — PRG
DATE OF SERVICE:  06/28/2018

 

SERVICE:  Pulmonary Medicine.

 

INTERVAL HISTORY:  The patient is doing fine from a respiratory standpoint.  He denies any current ch
est pain, nausea, vomiting, fevers or chills.  His blood pressures firmed up yesterday.  That being s
aid, this morning, the fall back off a little bit.  Urine output has been okay.  Mentation wise, the 
patient is actually doing fairly well.  He is participating with physical therapy to the best of his 
ability.  I am told that when he was in rehab earlier, he was able to stand and walk couple of steps 
with physical therapy, and transfer into a wheelchair.

 

PHYSICAL EXAMINATION:

VITAL SIGNS:  Afebrile, pulse 68, blood pressure 98/49, respirations 22, and saturation 92% on 3 lite
rs nasal cannula.

HEENT:  Normocephalic, atraumatic.  Sclerae are white, conjunctivae pink.  Oral mucosa is moist witho
ut lesions.

LUNGS:  Decent air entry.  There is a slightly prolonged expiratory phase.  Dependent crackles are pr
esent.  No wheezing or rhonchi are appreciated.

HEART:  Normal rate, regular.

ABDOMEN:  Soft, nontender, nondistended.  Bowel sounds are positive.

MUSCULOSKELETAL:  No cyanosis or clubbing.  There is no pitting in the bilateral lower extremities.

NEUROLOGIC:  Grossly nonfocal.

 

LABORATORY DATA:  Creatinine 1.96, BUN 72 and up trending.  Basic metabolic profile is otherwise unre
markable.  E. coli is growing in the urine as well as blood cultures.  It appears to be sensitive to 
cephalosporins.

 

ASSESSMENT:

1.  Severe sepsis.

2.  Bacteremia secondary to Escherichia coli.

3.  Urinary tract infection secondary to Escherichia coli.

4.  Acute kidney injury.

5.  Urinary retention with chronic indwelling Sommer catheter.

6.  Deconditioning, severe.

7.  Morbid obesity.

8.  Osteoarthritis, severe.

 

DISCUSSION AND PLAN:  The patient can be transitioned out of the IMCU to the medical unit.  We will n
eed to watch his volume status, and kidney function very closely.  Given 500 mL of water and give him
 half normal saline at a very low 50 mL per hour to supplement his lack of p.o. intake.  Pulmonary wi
ll continue to follow on the floor for the time being.  I will leave him on the Zosyn for the time be
ing until the blood culture sensitivities result.

## 2018-06-28 NOTE — PQF
DATE:     6-28-18                                                 ATTN:   DR. YUINEL ORTIZ



Please exercise your independent, professional judgment in responding to the 
clarification form. 

Clinical indicators are provided on the bottom of this form for your review



Please check appropriate box(es):

[ X] Sepsis due to UTI due to Guallpa Catheter

[  ]  Sepsis due to UTI NOT due to Guallpa Catheter            

[  ] Septic Shock

[  ] Other diagnosis ___________

[  ] Unable to determine



In addition, please specify:



Present on Admission (POA):  [X ] Yes             [  ] No             [  ] 
Unable to determine



For continuity of documentation, please document condition throughout progress 
notes and discharge summary.  Thank You.



CLINICAL INDICATORS - SIGNS / SYMPTOMS / LABS



ER:   GUALLPA IN PLACE.



H&P:   THE PATIENT WAS  APPARENTLY SOMEWHAT ALTERED AND HAVING SOME DIFFICULTY 

           GIVING ANY HISTORY AT THE TIME IN THE ER.  EMS HAD INDICATED HE WAS 
COMPLAINING 

          OF SOME PAIN IN HIS SUPRAPUBIC AND CATHETER AREA.



H&P:   UTI WITH RELATED SEPSIS WITH SIGNIFICANT HYPOTENSION,  



ER DX:   SOB,  HYPOTENSION,  HYPOXEMIA



CONSULT NOTE DR. MEDRANO 6-27-18:   SEVERE SEPSIS, BACTEREMIA SECONDARY TO E 
COLI, UTI 2/2 E 

            COLI PLUS MINUS PROTEUS, URINARY RETENTION WITH CHRONIC INDWELLING 
GUALLPA CATHETER



PN 6-27-18:   ACUTE SEPSIS



WBC:  6-26-18:   11.1



BANDS:  6-27-18:   40



TEMP:   6-26-18:   100.9,   100.9



BP:   6-26-18:   113/45,  91/43,

         6-26-18:    76/31,  98/45,  92/49,  84/42



RISK FACTORS:   H&P:   UTI WITH RELATED SEPSIS WITH SIGNIFICANT HYPOTENSION,  



TREATMENTS:  ER:   VANCOMYCIN, ZOSYN,  IVF



(This form is maintained as a part of the permanent medical record)

 2015 [employer]  All Rights Reserved

[email or web address]    Office:  [phone]

                                                              

 

[facility]

## 2018-06-28 NOTE — PDOC.PN
- Subjective


Encounter Start Date: 06/28/18


Encounter Start Time: 14:30





DOES NOT KNOW WHO I AM ALTHOUGH I HAVE SEEN HIM DAILY.  SAYS HIS TAILBONE 

HURTS.  OTHERWISE, HE SAYS HE DOES NOT FEEL ANY BETTER OVERALL.





- Objective


Resuscitation Status: 


 











Resuscitation Status           DNR:Do Not Resuscitate














MAR Reviewed: Yes


Vital Signs & Weight: 


 Vital Signs (12 hours)











  Temp Pulse Pulse Pulse Resp BP BP


 


 06/28/18 14:14   72    24 H  


 


 06/28/18 11:11  99.1 F  65    22 H  


 


 06/28/18 10:31    58 L  63   105/47 L  104/55 L


 


 06/28/18 10:23   64    16  


 


 06/28/18 08:00  97.3 F L  63    20  


 


 06/28/18 07:39  98.4 F  67    22 H  


 


 06/28/18 07:05       


 


 06/28/18 07:02   59 L    16  


 


 06/28/18 04:00  98.7 F  60    18  


 


 06/28/18 03:10       














  BP Pulse Ox Pulse Ox Pulse Ox


 


 06/28/18 14:14   93 L  


 


 06/28/18 11:11  104/55 L  92 L  


 


 06/28/18 10:31    92 L  91 L


 


 06/28/18 10:23   92 L  


 


 06/28/18 08:00   92 L  


 


 06/28/18 07:39  98/49 L  92 L  


 


 06/28/18 07:05   92 L  


 


 06/28/18 07:02   92 L  


 


 06/28/18 04:00  88/47 L  95  


 


 06/28/18 03:10   96  








 Weight











Weight                         245 lb 3 oz














I&O: 


 











 06/27/18 06/28/18 06/29/18





 06:59 06:59 06:59


 


Intake Total 450 1800 


 


Output Total 500 925 


 


Balance -50 875 











Result Diagrams: 


 06/27/18 03:18





 06/28/18 03:35





Phys Exam





- Physical Examination


STILL TACYPNEIC WITH SOME LABORED BREATHING.


Neck: no JVD, supple


DIFFUSE WHEEZING. 


Cardiovascular: RRR, no significant murmur


Gastrointestinal: soft, non-tender, no distention


Musculoskeletal: no edema





Dx/Plan


(1) Sepsis


Code(s): A41.9 - SEPSIS, UNSPECIFIED ORGANISM   Status: Acute   Comment: 


E COLI SECONDARY TO THE GUALLPA CATHETER.  POSITIVE BLOOD CULTURES.  SENSITIVE TO 

ZOSYN.     





(2) E. coli UTI


Code(s): N39.0 - URINARY TRACT INFECTION, SITE NOT SPECIFIED; B96.20 - UNSP 

ESCHERICHIA COLI AS THE CAUSE OF DISEASES CLASSD ELSWHR   Status: Acute   

Comment: 


ON ZOSYN.  CHRONIC INDWELLING GUALLPA CATHETER.  ADJUST ABX TO SENSITIVITIES.   





(3) E coli bacteremia


Code(s): R78.81 - BACTEREMIA   Status: Acute   





(4) Acute on chronic respiratory failure with hypoxia and hypercapnia


Code(s): J96.21 - ACUTE AND CHRONIC RESPIRATORY FAILURE WITH HYPOXIA; J96.22 - 

ACUTE AND CHRONIC RESPIRATORY FAILURE WITH HYPERCAPNIA   Status: Acute   Comment

: 


PULMONOLOGY FOLLOWING.  REFUSING ANY MASK DEVIDE SUPPORT.  CONTINUE NEBS, 

OXYGEN SUPPORT.   





(5) COPD exacerbation


Code(s): J44.1 - CHRONIC OBSTRUCTIVE PULMONARY DISEASE W (ACUTE) EXACERBATION   

Status: Acute   





(6) Diastolic CHF


Code(s): I50.30 - UNSPECIFIED DIASTOLIC (CONGESTIVE) HEART FAILURE   Status: 

Chronic   


Qualifiers: 


   Heart failure chronicity: chronic   Qualified Code(s): I50.32 - Chronic 

diastolic (congestive) heart failure   


Comment: HAS PULMONARY EDEMA ON CXR, BUT REQUIRING ADDITIONAL FLUIDS FOR BP 

SUPPORT.   





(7) H/O sick sinus syndrome


Code(s): Z86.79 - PERSONAL HISTORY OF OTHER DISEASES OF THE CIRCULATORY SYSTEM 

  Status: Chronic   





(8) Morbid obesity


Code(s): E66.01 - MORBID (SEVERE) OBESITY DUE TO EXCESS CALORIES   Status: 

Chronic   





(9) WILMA (obstructive sleep apnea)


Code(s): G47.33 - OBSTRUCTIVE SLEEP APNEA (ADULT) (PEDIATRIC)   Status: Chronic

   Comment: 


REFUSING CPAP.   





(10) Acute renal insufficiency


Code(s): N28.9 - DISORDER OF KIDNEY AND URETER, UNSPECIFIED   Status: Acute   

Comment: 


PRESENTED WITH ACUTE RENAL INSUFFICIENCY.  HAS REQUIRED SIGNIFICANT AMOUNTS OF 

FLUID.  NUMBERS HAVE NOT IMPROVED WITH IMPROVED BP AND ADDITIONAL FLUIDS.  

RECHECK TOMORROW.  CONSIDER NEPHROLOGY IF NOT IMPROVED.     





- Plan





* TRANSFER TO FLOOR.  CONTINUE ABX.  POOR OVERALL PROGNOSIS.

## 2018-06-29 LAB
ANION GAP SERPL CALC-SCNC: 10 MMOL/L (ref 10–20)
BUN SERPL-MCNC: 59 MG/DL (ref 8.4–25.7)
CALCIUM SERPL-MCNC: 7.7 MG/DL (ref 7.8–10.44)
CHLORIDE SERPL-SCNC: 104 MMOL/L (ref 98–107)
CO2 SERPL-SCNC: 26 MMOL/L (ref 23–31)
CREAT CL PREDICTED SERPL C-G-VRATE: 65 ML/MIN (ref 70–130)
GLUCOSE SERPL-MCNC: 121 MG/DL (ref 80–115)
MAGNESIUM SERPL-MCNC: 2.1 MG/DL (ref 1.6–2.6)
POTASSIUM SERPL-SCNC: 3.8 MMOL/L (ref 3.5–5.1)
SODIUM SERPL-SCNC: 136 MMOL/L (ref 136–145)

## 2018-06-29 PROCEDURE — 02H633Z INSERTION OF INFUSION DEVICE INTO RIGHT ATRIUM, PERCUTANEOUS APPROACH: ICD-10-PCS | Performed by: RADIOLOGY

## 2018-06-29 RX ADMIN — Medication SCH ML: at 20:49

## 2018-06-29 RX ADMIN — CEFTRIAXONE SCH MLS: 2 INJECTION, POWDER, FOR SOLUTION INTRAMUSCULAR; INTRAVENOUS at 12:27

## 2018-06-29 RX ADMIN — Medication SCH ML: at 09:08

## 2018-06-29 NOTE — PRG
DATE OF SERVICE:  06/29/2018

 

SERVICE:  Pulmonary Medicine

 

INTERVAL HISTORY:  The patient is doing fine from a respiratory standpoint.  He is breathing comforta
ramona.  He has no chest pain, nausea, vomiting, fevers or chills.  There has been no significant overni
ght events.  He is working with physical therapy to the best of his ability.  He still remains at [facility] toxic appearing.  That being said, his looks improved dramatically over the last 24 hours.

 

PHYSICAL EXAMINATION:

VITAL SIGNS:  Afebrile, pulse 69, blood pressure 103/59, respirations 20, saturation  93% on 3 liters
 nasal cannula.

GENERAL:  The patient is awake, alert, no apparent distress.

LUNGS:  Decent air entry.  There is a minimally prolonged expiratory phase.  There is no rhonchi or w
heezing appreciated, however.

HEART:  Normal rate, regular.

ABDOMEN:  Soft, nontender, nondistended.  Bowel sounds are positive.

MUSCULOSKELETAL:  No cyanosis or clubbing.  No pitting in the bilateral lower extremities.

NEUROLOGIC:  Grossly nonfocal.

 

LABORATORY DATA:  Creatinine 1.66.  Magnesium and phosphorus fall within the normal limits.  Potassiu
m 3.8.  Basic metabolic profile is otherwise unremarkable.  E. coli is growing in the blood and the u
rine.  The blood species is sensitive to Rocephin.  There is resistance to Levaquin and Cipro.

 

ASSESSMENT:

1.  Severe sepsis.

2.  Bacteremia secondary to Escherichia coli.

3.  Urinary tract infection secondary to Escherichia coli.

4.  Acute kidney injury.

5.  Urinary retention with chronic indwelling Sommer catheter.

6.  Deconditioning, severe.

7.  Osteoarthritis, severe.

8.  Morbid obesity.

 

DISCUSSION AND PLAN:  We will need to put a PICC line in.  Once antibiotic choice and duration is est
ablished, the patient can be considered for transition out of the hospital.  It appears that his acut
e kidney injury is starting to improve.  If in 24 hours, if things continue to improve, he will be st
able for transition out of the hospital.  Pulmonary will continue to follow along for the time being 
until he more clearly turns the corner.

## 2018-06-29 NOTE — PDOC.PN
- Subjective


Encounter Start Date: 06/29/18


Encounter Start Time: 11:00





FEELS OK TODAY.  DENIES ANY NEEDS.  DENIES ANY SYMPTOMS.  





- Objective


Resuscitation Status: 


 











Resuscitation Status           DNR:Do Not Resuscitate














Vital Signs & Weight: 


 Vital Signs (12 hours)











  Temp Pulse Resp BP Pulse Ox


 


 06/29/18 11:10  98.6 F  65  20  121/70  95


 


 06/29/18 11:03   66  20   95


 


 06/29/18 08:00  98.6 F  69  20  103/59 L  93 L


 


 06/29/18 06:29      95


 


 06/29/18 06:28   72  20   95








 Weight











Weight                         245 lb 3 oz














I&O: 


 











 06/28/18 06/29/18 06/30/18





 06:59 06:59 06:59


 


Intake Total 1800 240 


 


Output Total 925 1150 


 


Balance 875 -910 











Result Diagrams: 


 06/27/18 03:18





 06/29/18 07:40





Phys Exam





- Physical Examination


Constitutional: NAD


PATIENTS GENERALLY SLEEPING WHEN I ENTER, BUT AWAKENS EASILY.


HEENT: oral pharynx no lesions


Neck: no JVD, supple


DIMINISHED BS, MODEST RALES.


Cardiovascular: RRR, no significant murmur


Gastrointestinal: soft, non-tender, no distention


Musculoskeletal: no edema


Skin: normal turgor





Dx/Plan


(1) Sepsis


Code(s): A41.9 - SEPSIS, UNSPECIFIED ORGANISM   Status: Acute   Comment: 


E COLI SECONDARY TO THE GUALLPA CATHETER.  POSITIVE BLOOD CULTURES.  SENSITIVE TO 

ZOSYN.     





(2) E. coli UTI


Code(s): N39.0 - URINARY TRACT INFECTION, SITE NOT SPECIFIED; B96.20 - UNSP 

ESCHERICHIA COLI AS THE CAUSE OF DISEASES CLASSD ELSWHR   Status: Acute   

Comment: 


ON ZOSYN.  CHRONIC INDWELLING GUALLPA CATHETER.     





(3) E coli bacteremia


Code(s): R78.81 - BACTEREMIA   Status: Acute   





(4) Acute on chronic respiratory failure with hypoxia and hypercapnia


Code(s): J96.21 - ACUTE AND CHRONIC RESPIRATORY FAILURE WITH HYPOXIA; J96.22 - 

ACUTE AND CHRONIC RESPIRATORY FAILURE WITH HYPERCAPNIA   Status: Acute   Comment

: 


PULMONOLOGY FOLLOWING.  REFUSING ANY MASK DEVIDE SUPPORT.  CONTINUE NEBS, 

OXYGEN SUPPORT.   





(5) COPD exacerbation


Code(s): J44.1 - CHRONIC OBSTRUCTIVE PULMONARY DISEASE W (ACUTE) EXACERBATION   

Status: Acute   





(6) Diastolic CHF


Code(s): I50.30 - UNSPECIFIED DIASTOLIC (CONGESTIVE) HEART FAILURE   Status: 

Chronic   


Qualifiers: 


   Heart failure chronicity: chronic   Qualified Code(s): I50.32 - Chronic 

diastolic (congestive) heart failure   


Comment: NO EVIDENCE OF DECOMPENSATION.   





(7) H/O sick sinus syndrome


Code(s): Z86.79 - PERSONAL HISTORY OF OTHER DISEASES OF THE CIRCULATORY SYSTEM 

  Status: Chronic   





(8) Morbid obesity


Code(s): E66.01 - MORBID (SEVERE) OBESITY DUE TO EXCESS CALORIES   Status: 

Chronic   





(9) WILMA (obstructive sleep apnea)


Code(s): G47.33 - OBSTRUCTIVE SLEEP APNEA (ADULT) (PEDIATRIC)   Status: Chronic

   Comment: 


REFUSING CPAP.   





(10) Acute renal insufficiency


Code(s): N28.9 - DISORDER OF KIDNEY AND URETER, UNSPECIFIED   Status: Acute   

Comment: 


PRESENTED WITH ACUTE RENAL INSUFFICIENCY.  STABLE AND SLIGHTLY IMPROVED.   





- Plan





* DISCUSSED WITH THE PATIENT AND THE CM THIS MORNING.  HE CAN HAVE PICC AND 

COMPLETE A 10 DAY COURSE OF IV ABX AT THE NURSING FACILITY.  DISCUSSED WITH HIS 

WIFE THIS AFTERNOON AND SHE IS AMENABLE TO THAT PLAN.  PICC WAS PLACED EARLIER 

THIS AFTERNOON.

## 2018-06-29 NOTE — SPC
SONOGRAPHIC GUIDED RIGHT UPPER EXTREMITY PICC PLACEMENT:

 

HISTORY: 

Urinary tract infection.  Need for long-term antibiotics.

 

FINDINGS: 

After explaining the procedure and answering all questions, the right upper extremity was prepped and
 draped in the usual sterile fashion.  Sterile technique, buffered local anesthesia, sonographic guid
ance, and a 22-gauge needle were used to carefully access the right basilic vein.  Standard technique
 was then used to place the tip of a 5 Georgian dual-lumen PICC so that the tip lies at the level of th
e right atrium.  Catheter is flushed and secured externally.  The patient tolerated the procedure wel
l and was returned in unchanged condition.

 

IMPRESSION: 

Technically successful right upper extremity PICC placement.  Catheter is now ready for use.

 

POS: [facility]

## 2018-06-30 LAB
ANION GAP SERPL CALC-SCNC: 10 MMOL/L (ref 10–20)
BUN SERPL-MCNC: 36 MG/DL (ref 8.4–25.7)
CALCIUM SERPL-MCNC: 8.1 MG/DL (ref 7.8–10.44)
CHLORIDE SERPL-SCNC: 106 MMOL/L (ref 98–107)
CO2 SERPL-SCNC: 28 MMOL/L (ref 23–31)
CREAT CL PREDICTED SERPL C-G-VRATE: 90 ML/MIN (ref 70–130)
GLUCOSE SERPL-MCNC: 132 MG/DL (ref 80–115)
POTASSIUM SERPL-SCNC: 3.7 MMOL/L (ref 3.5–5.1)
SODIUM SERPL-SCNC: 140 MMOL/L (ref 136–145)

## 2018-06-30 RX ADMIN — ACETAMINOPHEN AND CODEINE PHOSPHATE PRN TAB: 300; 30 TABLET ORAL at 14:25

## 2018-06-30 RX ADMIN — Medication SCH ML: at 10:02

## 2018-06-30 RX ADMIN — CEFTRIAXONE SCH MLS: 2 INJECTION, POWDER, FOR SOLUTION INTRAMUSCULAR; INTRAVENOUS at 14:25

## 2018-06-30 RX ADMIN — ACETAMINOPHEN AND CODEINE PHOSPHATE PRN TAB: 300; 30 TABLET ORAL at 23:19

## 2018-06-30 RX ADMIN — Medication SCH ML: at 21:26

## 2018-06-30 RX ADMIN — ACETAMINOPHEN AND CODEINE PHOSPHATE PRN TAB: 300; 30 TABLET ORAL at 18:46

## 2018-06-30 NOTE — PRG
DATE OF SERVICE:  06/30/2018

 

SUBJECTIVE:  This morning, he is encephalopathic, arousable.  Denies any pain or discomfort.

 

OBJECTIVE:

VITAL SIGNS:  Temperature 97, pulse 70, respiratory rate 16, sats are 100%, blood pressure 132/72.

CHEST:  Chest reveals decreased breath sounds without any wheezing.

CARDIAC:  Normal S1, S2, no gallops.

ABDOMEN:  Soft.

 

Patient with sepsis, E. coli, renal failure, severe deconditioning, morbid obesity, probably has slee
p apnea.

 

A PICC line was inserted for the purpose of access.  Continue supportive care and PT.

 

We will follow.

## 2018-07-01 VITALS — TEMPERATURE: 97.6 F | DIASTOLIC BLOOD PRESSURE: 73 MMHG | SYSTOLIC BLOOD PRESSURE: 135 MMHG

## 2018-07-01 LAB
ANION GAP SERPL CALC-SCNC: 10 MMOL/L (ref 10–20)
BUN SERPL-MCNC: 29 MG/DL (ref 8.4–25.7)
CALCIUM SERPL-MCNC: 8.2 MG/DL (ref 7.8–10.44)
CHLORIDE SERPL-SCNC: 105 MMOL/L (ref 98–107)
CO2 SERPL-SCNC: 32 MMOL/L (ref 23–31)
CREAT CL PREDICTED SERPL C-G-VRATE: 92 ML/MIN (ref 70–130)
GLUCOSE SERPL-MCNC: 103 MG/DL (ref 80–115)
POTASSIUM SERPL-SCNC: 3.7 MMOL/L (ref 3.5–5.1)
SODIUM SERPL-SCNC: 143 MMOL/L (ref 136–145)

## 2018-07-01 RX ADMIN — ACETAMINOPHEN AND CODEINE PHOSPHATE PRN TAB: 300; 30 TABLET ORAL at 05:00

## 2018-07-01 RX ADMIN — CEFTRIAXONE SCH MLS: 2 INJECTION, POWDER, FOR SOLUTION INTRAMUSCULAR; INTRAVENOUS at 13:42

## 2018-07-01 RX ADMIN — Medication SCH ML: at 09:11

## 2018-07-02 ENCOUNTER — HOSPITAL ENCOUNTER (OUTPATIENT)
Dept: HOSPITAL 92 - ERS | Age: 73
Setting detail: OBSERVATION
LOS: 1 days | Discharge: NURSING, REHAB OR LONG-TERM CARE | End: 2018-07-03
Attending: INTERNAL MEDICINE | Admitting: INTERNAL MEDICINE
Payer: MEDICARE

## 2018-07-02 VITALS — BODY MASS INDEX: 38.9 KG/M2

## 2018-07-02 DIAGNOSIS — I49.5: ICD-10-CM

## 2018-07-02 DIAGNOSIS — N39.0: ICD-10-CM

## 2018-07-02 DIAGNOSIS — I11.0: ICD-10-CM

## 2018-07-02 DIAGNOSIS — E66.01: ICD-10-CM

## 2018-07-02 DIAGNOSIS — I50.32: ICD-10-CM

## 2018-07-02 DIAGNOSIS — R78.81: ICD-10-CM

## 2018-07-02 DIAGNOSIS — Z91.19: ICD-10-CM

## 2018-07-02 DIAGNOSIS — M19.90: ICD-10-CM

## 2018-07-02 DIAGNOSIS — Z79.899: ICD-10-CM

## 2018-07-02 DIAGNOSIS — J96.22: Primary | ICD-10-CM

## 2018-07-02 DIAGNOSIS — R33.8: ICD-10-CM

## 2018-07-02 DIAGNOSIS — I25.10: ICD-10-CM

## 2018-07-02 DIAGNOSIS — N40.1: ICD-10-CM

## 2018-07-02 DIAGNOSIS — E78.5: ICD-10-CM

## 2018-07-02 DIAGNOSIS — K21.9: ICD-10-CM

## 2018-07-02 DIAGNOSIS — B96.4: ICD-10-CM

## 2018-07-02 DIAGNOSIS — J44.9: ICD-10-CM

## 2018-07-02 DIAGNOSIS — J96.21: ICD-10-CM

## 2018-07-02 DIAGNOSIS — G47.33: ICD-10-CM

## 2018-07-02 DIAGNOSIS — Z66: ICD-10-CM

## 2018-07-02 DIAGNOSIS — B96.20: ICD-10-CM

## 2018-07-02 DIAGNOSIS — Z79.82: ICD-10-CM

## 2018-07-02 DIAGNOSIS — D64.9: ICD-10-CM

## 2018-07-02 LAB
ALBUMIN SERPL BCG-MCNC: 2.9 G/DL (ref 3.4–4.8)
ALP SERPL-CCNC: 59 U/L (ref 40–150)
ALT SERPL W P-5'-P-CCNC: 16 U/L (ref 8–55)
ANALYZER IN CARDIO: (no result)
ANION GAP SERPL CALC-SCNC: 8 MMOL/L (ref 10–20)
AST SERPL-CCNC: 13 U/L (ref 5–34)
BASE EXCESS STD BLDA CALC-SCNC: 5.3 MEQ/L
BASOPHILS # BLD AUTO: 0 THOU/UL (ref 0–0.2)
BASOPHILS NFR BLD AUTO: 0.3 % (ref 0–1)
BILIRUB SERPL-MCNC: 0.4 MG/DL (ref 0.2–1.2)
BUN SERPL-MCNC: 20 MG/DL (ref 8.4–25.7)
CA-I BLDA-SCNC: 1.2 MMOL/L (ref 1.12–1.3)
CALCIUM SERPL-MCNC: 8.3 MG/DL (ref 7.8–10.44)
CHLORIDE SERPL-SCNC: 107 MMOL/L (ref 98–107)
CK MB SERPL-MCNC: 1.5 NG/ML (ref 0–6.6)
CO2 SERPL-SCNC: 32 MMOL/L (ref 23–31)
CREAT CL PREDICTED SERPL C-G-VRATE: 0 ML/MIN (ref 70–130)
CRYSTAL-AUWI FLAG: 0.2 (ref 0–15)
EOSINOPHIL # BLD AUTO: 0.4 THOU/UL (ref 0–0.7)
EOSINOPHIL NFR BLD AUTO: 6.1 % (ref 0–10)
GLOBULIN SER CALC-MCNC: 2.8 G/DL (ref 2.4–3.5)
GLUCOSE SERPL-MCNC: 140 MG/DL (ref 80–115)
HCO3 BLDA-SCNC: 30.6 MEQ/L (ref 22–28)
HCT VFR BLDA CALC: 28.9 % (ref 42–52)
HEV IGM SER QL: 16.1 (ref 0–7.99)
HGB BLD-MCNC: 10.2 G/DL (ref 14–18)
HGB BLDA-MCNC: 9.6 G/DL (ref 14–18)
HYALINE CASTS #/AREA URNS LPF: (no result) LPF
LIPASE SERPL-CCNC: 14 U/L (ref 8–78)
LYMPHOCYTES # BLD: 0.7 THOU/UL (ref 1.2–3.4)
LYMPHOCYTES NFR BLD AUTO: 11.1 % (ref 21–51)
MAGNESIUM SERPL-MCNC: 2.4 MG/DL (ref 1.6–2.6)
MCH RBC QN AUTO: 30.5 PG (ref 27–31)
MCV RBC AUTO: 93.2 FL (ref 78–98)
MONOCYTES # BLD AUTO: 0.5 THOU/UL (ref 0.11–0.59)
MONOCYTES NFR BLD AUTO: 7.8 % (ref 0–10)
NEUTROPHILS # BLD AUTO: 4.6 THOU/UL (ref 1.4–6.5)
NEUTROPHILS NFR BLD AUTO: 74.7 % (ref 42–75)
PATHC CAST-AUWI FLAG: 0.58 (ref 0–2.49)
PCO2 BLDA: 49.2 MMHG (ref 35–45)
PH BLDA: 7.41 [PH] (ref 7.35–7.45)
PLATELET # BLD AUTO: 190 THOU/UL (ref 130–400)
PO2 BLDA: 84.5 MMHG (ref 70–?)
POTASSIUM SERPL-SCNC: 3.9 MMOL/L (ref 3.5–5.1)
PROT UR STRIP.AUTO-MCNC: 30 MG/DL
RBC # BLD AUTO: 3.35 MILL/UL (ref 4.7–6.1)
RBC UR QL AUTO: (no result) HPF (ref 0–3)
SODIUM SERPL-SCNC: 143 MMOL/L (ref 136–145)
SP GR UR STRIP: 1.01 (ref 1–1.04)
SPECIMEN DRAWN FROM PATIENT: (no result)
SPERM-AUWI FLAG: 0 (ref 0–9.9)
TROPONIN I SERPL DL<=0.01 NG/ML-MCNC: (no result) NG/ML (ref ?–0.03)
TROPONIN I SERPL DL<=0.01 NG/ML-MCNC: 0.01 NG/ML (ref ?–0.03)
TROPONIN I SERPL DL<=0.01 NG/ML-MCNC: 0.03 NG/ML (ref ?–0.03)
WBC # BLD AUTO: 6.2 THOU/UL (ref 4.8–10.8)
YEAST-AUWI FLAG: 36 (ref 0–25)

## 2018-07-02 PROCEDURE — 96372 THER/PROPH/DIAG INJ SC/IM: CPT

## 2018-07-02 PROCEDURE — 82553 CREATINE MB FRACTION: CPT

## 2018-07-02 PROCEDURE — 36415 COLL VENOUS BLD VENIPUNCTURE: CPT

## 2018-07-02 PROCEDURE — G0378 HOSPITAL OBSERVATION PER HR: HCPCS

## 2018-07-02 PROCEDURE — 94640 AIRWAY INHALATION TREATMENT: CPT

## 2018-07-02 PROCEDURE — 82805 BLOOD GASES W/O2 SATURATION: CPT

## 2018-07-02 PROCEDURE — 85025 COMPLETE CBC W/AUTO DIFF WBC: CPT

## 2018-07-02 PROCEDURE — 97139 UNLISTED THERAPEUTIC PX: CPT

## 2018-07-02 PROCEDURE — 94660 CPAP INITIATION&MGMT: CPT

## 2018-07-02 PROCEDURE — 71045 X-RAY EXAM CHEST 1 VIEW: CPT

## 2018-07-02 PROCEDURE — 93005 ELECTROCARDIOGRAM TRACING: CPT

## 2018-07-02 PROCEDURE — A4216 STERILE WATER/SALINE, 10 ML: HCPCS

## 2018-07-02 PROCEDURE — 81015 MICROSCOPIC EXAM OF URINE: CPT

## 2018-07-02 PROCEDURE — 81003 URINALYSIS AUTO W/O SCOPE: CPT

## 2018-07-02 PROCEDURE — 83880 ASSAY OF NATRIURETIC PEPTIDE: CPT

## 2018-07-02 PROCEDURE — 51702 INSERT TEMP BLADDER CATH: CPT

## 2018-07-02 PROCEDURE — 87086 URINE CULTURE/COLONY COUNT: CPT

## 2018-07-02 PROCEDURE — 83735 ASSAY OF MAGNESIUM: CPT

## 2018-07-02 PROCEDURE — 94664 DEMO&/EVAL PT USE INHALER: CPT

## 2018-07-02 PROCEDURE — 83690 ASSAY OF LIPASE: CPT

## 2018-07-02 PROCEDURE — 36416 COLLJ CAPILLARY BLOOD SPEC: CPT

## 2018-07-02 PROCEDURE — 99285 EMERGENCY DEPT VISIT HI MDM: CPT

## 2018-07-02 PROCEDURE — 80053 COMPREHEN METABOLIC PANEL: CPT

## 2018-07-02 PROCEDURE — 84484 ASSAY OF TROPONIN QUANT: CPT

## 2018-07-02 RX ADMIN — Medication SCH ML: at 20:59

## 2018-07-02 RX ADMIN — Medication SCH ML: at 10:40

## 2018-07-02 RX ADMIN — CEFTRIAXONE SCH MLS: 2 INJECTION, POWDER, FOR SOLUTION INTRAMUSCULAR; INTRAVENOUS at 10:37

## 2018-07-02 RX ADMIN — MOMETASONE FUROATE AND FORMOTEROL FUMARATE DIHYDRATE SCH PUFF: 200; 5 AEROSOL RESPIRATORY (INHALATION) at 18:55

## 2018-07-02 NOTE — DIS
DATE OF ADMISSION:  06/26/2018

 

DATE OF DISCHARGE:  07/01/2018

 

DISCHARGE DIAGNOSES:

1.  Sepsis secondary to urinary tract infection.

2.  Urinary tract infection with Escherichia coli.

3.  Bacteremia with Escherichia coli.

4.  Acute on chronic respiratory failure with hypoxia and hypercapnia.

5.  Chronic obstructive pulmonary disease.

6.  History of diastolic congestive heart failure.

7.  History of sick sinus rhythm.

8.  Morbid obesity.

9.  Obstructive sleep apnea.

10.  Acute renal insufficiency.

11.  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

12.  History of noncompliance with BiPAP.

 

HISTORY:  This patient is a 72-year-old male who has been in the hospital several times previously wi
th respiratory compromise.  The patient has some COPD and diastolic heart failure and obesity.  He ha
s some sleep apnea type syndrome as well, but has refused to wear any mask type respiratory support. 
 The patient presented to the emergency department on 06/26/2018 with complaints of hypoxia.  The pat
ient is at [facility] and they called EMS, because the patient was hypoxic.  On arrival, the patient w
as at 88% on 5 liters.  The patient was also hypoxic at that time with blood pressure at 80s over 50s
.  The patient had a chronic indwelling Sommer.  In the emergency department, he was given fluid bolus
es and started on antibiotics, vancomycin and Zosyn for sepsis.

 

HOSPITAL COURSE:  The patient was admitted to the [facility] and Pulmonology was consulted.  The patient co
ntinued to receive some gentle fluid resuscitation given his underlying history of cardiac disease.  
His blood pressure did ultimately respond.  His urine and blood cultures both grew E. coli.  He was a
ble to move out of the ICU to a regular floor bed.  Once the sensitivities were established, the laila
ent was reduced to Rocephin alone.  On the floor, the patient remained stable for a couple of days.  
He had a PICC line placed, so that he can continue his IV antibiotics and the patient was felt to be 
stable for discharge back to [facility].  Initially, the patient was discharged on Saturday on 06/30/2
018.  At that time, temperature was 98.4, pulse was 73, respirations were 20, O2 sats 100% on 2 liter
s, blood pressure is 142/73.  The patient was chronically somnolent, but generally easily awakened.  
He reported no complaints.  His heart was regular and his lungs were clear.  Abdomen was soft and ext
remities had some persistent edema.

 

DISPOSITION:  The patient was discharged back to [facility].  He is to have a regular diet, although t
his to be a low sodium and heart healthy.

 

DISCHARGE MEDICATIONS:  He is to have Rocephin 2 grams IV q.24 hours, carvedilol 6.25 mg b.i.d., Norv
asc 5 mg daily, Zocor 20 mg daily, aspirin 81 mg daily, Avodart 0.5 mg daily, Mucinex 1200 mg b.i.d.,
 Protonix 40 mg b.i.d., Flomax 0.4 mg daily, Proscar 5 mg daily, Lasix 40 mg daily, lisinopril 10 mg 
daily, Theragran M one p.o. daily, ferrous sulfate 325 one p.o. daily, Dulcolax p.r.n., docusate trish
y p.r.n., DuoNeb q.4 hours, Dulera 2 puffs b.i.d., Tylenol #3 one q.6 hours p.r.n.

 

FOLLOWUP:  He is to follow up with his provider at [facility] and he is to return to the emergency dep
artment should he have any problems prior to that time.

## 2018-07-02 NOTE — CON
DATE OF CONSULTATION:  07/02/2018

 

REASON FOR CONSULTATION:  [facility] patient.

 

CHIEF COMPLAINT:  "My nurse at the nursing home was psychotic."

 

INTERVAL HISTORY:  The patient was discharged from the hospital yesterday.  
Shortly after arriving, his vital signs were checked.  He felt like he was 
doing just fine.  That being said, the nurse got very excited about some 
numbers.  He was subsequently transitioned back to the hospital and placed on 
BiPAP.  His saturations were actually normal.  His pCO2 is elevated, but it 
looks like he was very well compensated and did not require the BiPAP for 
hypercapnia.  He was tucked in the IMCU.  There has been absolutely no change 
to his condition over the last 24 hours.

 

PAST MEDICAL HISTORY, PAST SURGICAL HISTORY, FAMILY HISTORY, SOCIAL HISTORY, 
ALLERGIES, AND MEDICATIONS:  Please refer to the admission H and P dated 06/26/
2018, and the discharge summary dated 07/01/2018.

 

LABORATORY DATA:  WBC 6.2, hemoglobin 10.2, platelets 190,000.  PH 7.41, pCO2 
of 49, pO2 of 85.  On BiPAP at 12/6 with a FIO2 of 35%.  Creatinine 1.1, 
continuing to trend downward compared to his previous hospital stay.  Basic 
metabolic profile, liver function studies are unremarkable.  Troponin is gently 
up trending to 0.029.  , which is slightly above baseline.  Lipase 14.  
Urinalysis is completely unremarkable at this time.  Recent Escherichia coli, 
and Proteus were growing in the blood and urine.  Both of these were sensitive 
to Rocephin.

 

ASSESSMENT:

1.  Severe sepsis, resolved.

2.  Bacteremia secondary to Escherichia coli.

3.  Chronic hypoxic and hypercapnic respiratory failure without acute component.

4.  Urinary tract infection secondary to Escherichia coli and Proteus.

5.  Chronic urine retention with indwelling Sommer catheter.

6.  Deconditioning, severe.

7.  Osteoarthritis, severe.

8.  Morbid obesity.



 

DISCUSSION AND PLAN:  We will provide him with a dose of Lasix.  From my 
perspective, the patient is at his baseline and can be considered for 
transition to the telemetry unit.  Pulmonary Critical Care will continue to 
follow along for the time being, but in 24 hours if he remains stable, he 
should be transitioned back to his care facility.  His ultimate goal is to 
rehabilitate to the point where he could possibly tolerate a hip surgery as 
this is the thing that limits his function.

 

50 minutes have been devoted to this patient in various activities.  I 
personally reviewed all imaging studies and laboratory data noted within this 
document.  For fifty percent of this time, I was interacting with the patient 
at the bedside or coordinating care with the care team.  For the remainder of 
the time I was immediately available to the patient in the hospital unit.  



ALLIE

## 2018-07-02 NOTE — HP
PRIMARY CARE PHYSICIAN:  Dr. Norris.

 

REASON FOR ADMISSION:  Sent from nursing home for dyspnea.

 

HISTORY OF PRESENT ILLNESS:  A 72-year-old male who was recently admitted in our hospital on 06/26/20
18.  During that admission, patient presented to ER with hypoxia.  He was admitted to medical ICU.  H
e was hypoxic and little bit hypercapnic and required BiPAP.  During that admission, suspected for as
piration and urinary tract infection and sepsis.  His blood culture was positive for E. coli and urin
e culture was positive for E. coli and Proteus.  The patient was noncompliant with BiPAP/CPAP machine
.  He did not want to use that machine.  He had PICC line placed in 06/29/2018 and he was planned for
 discharge on 06/30/2013 but he did not go to the nursing home and he was discharged yesterday.  Afte
r going to nursing home, he returned back with dyspnea.  He was saturating 91%.  He was given BiPAP i
n the emergency room and subsequently again he was admitted to IMCU.  Patient denies any chest pain. 
 He denies any fever or chills.  He denies any nausea, vomiting, diarrhea.  He has PICC line in place
 and PICC line site is clean and healthy.  This patient is not a good historian.  The patient also do
es not use CPAP machine, which is supposed to use during sleep.  [facility].  It was unclear how hypoxic and how dyspnea he had at nursing home.

 

REVIEW OF SYSTEMS:  The following complete review of systems was negative, unless otherwise mentioned
 in the HPI or below:

Constitutional:  Weight loss or gain, ability to conduct usual activities.

Skin:  Rash, itching.

Eyes:  Double vision, pain.

ENT/Mouth:  Nose bleeding, neck stiffness, pain, tenderness.

Cardiovascular:  Palpitations, dyspnea on exertion, orthopnea.

Respiratory:  Shortness of breath, wheezing, cough, hemoptysis, fever or night sweats.

Gastrointestinal:  Poor appetite, abdominal pain, heartburn, nausea, vomiting, constipation, or diarr
hea.

Genitourinary:  Urgency, frequency, dysuria, nocturia.

Musculoskeletal:  Pain, swelling.

Neurologic/Psychiatric:  Anxiety, depression.

Allergy/Immunologic:  Skin rash, bleeding tendency.

 

Please see my HPI for pertinent positive and negative.  All other review of system reviewed and negat
galdino except as mentioned in the HPI.

 

PAST MEDICAL HISTORY:  Chronic diastolic heart failure, morbid obesity, obstructive sleep apnea, nonc
ompliance with CPAP, peptic ulcer disease, chronic low back pain, hypertension, dyslipidemia, history
 of recurrent pneumonia, COPD, history of atrial flutter, required pacemaker, benign enlargement of p
rostate, recent admission for UTI with bacteremia.

 

PAST SURGICAL HISTORY:  Ablation for atrial flutter, pacemaker placement, endoscopy with cauterizatio
n of bleeding vessel.

 

PAST PSYCHIATRIC HISTORY:  Reviewed and negative.

 

FAMILY HISTORY:  No strong family history of premature coronary artery disease, stroke or cancer.

 

SOCIAL HISTORY:  Patient is , currently lives at nursing home.  No history of current tobacco,
 alcohol or illicit drug abuse.  He is a former smoker.

 

ALLERGIES:  No known drug allergy.

 

CURRENT HOME MEDICATIONS:  Tylenol #3 one tablet q.6 hourly p.r.n., amlodipine 5 mg p.o. daily, aspir
in 81 mg p.o. daily, Coreg 6.25 mg p.o. b.i.d., Rocephin 2 gram IV daily, Avodart 0.5 mg p.o. daily, 
ferrous sulfate 325 mg p.o. b.i.d., Lasix 40 mg p.o. daily, Mucinex 1200 mg p.o. twice daily, DuoNeb 
q.4 hourly and p.r.n., lisinopril 10 mg p.o. daily, Dulera 2 puffs inhalation b.i.d., multivitamin 1 
tablet p.o. daily, Protonix 40 mg p.o. b.i.d., Zocor 20 mg p.o. at bedtime, Flomax 0.4 mg p.o. daily.


 

EMERGENCY ROOM COURSE:  Reviewed.

 

PHYSICAL EXAMINATION:

VITAL SIGNS:  On arrival, blood pressure 147/72, pulse 71, respiratory rate 18, saturation 91%, weigh
t 118.5 kilograms.

GENERAL:  Patient is currently alert, awake.  He is on nasal cannula oxygen, maintaining saturation 9
2%.

HEAD:  Normocephalic, atraumatic.

EYES:  Pupils round, reactive to light.  Extraocular muscle intact.

ENT:  Oropharynx within normal limits.  Moist mucous membranes.  No oral lesion, no pharyngeal erythe
ma, no exudate.

NECK:  Supple, no JVD, no thyromegaly, no carotid bruit.

LUNGS:  Bilateral end expiratory wheezing heard.  No obvious rales noted.  Reduced air entry.  Examin
ation is limited because of obesity and physical deconditioning.

CARDIAC:  S1 and S2 appears regular.  No gross murmur noted, no gallop, no rub.

ABDOMEN:  Morbid obesity limiting examination.  Bowel sounds present, nontender, nondistended.  No or
ganomegaly, no mass, no suprapubic tenderness.

BACK:  Examination unremarkable, no CVA tenderness.

EXTREMITIES:  Upper extremity passive movement of all joints are normal.  Lower extremities:  Bilater
al trace lower extremity edema noted.  No calf tenderness.  Good distal pulsation.

SKIN:  No skin rash other than chronic hyperpigmentation in both lower extremities.

HEMATOLOGICAL SYSTEM:  No lymphadenopathy.

PSYCHIATRIC:  Normal affect.

NEUROLOGIC:  Grossly nonfocal examination.  He moves all 4 limbs.

 

IMAGING DATA AND SIGNIFICANT LABORATORY DATA:  EKG showing pacemaker rhythm, complete right bundle br
anch block pattern, left posterior fascicular block.  Chest x-ray showing poor inspiration, cardiomeg
analia, no acute process noted.  CBC:  WBC 6.2, hemoglobin 10.2, platelet 190.  ABG:  pH 7.41, pCO2 49.2
, pO2 84.5, bicarbonate 30.6, saturation 96.8 on BiPAP.  BMP:  Sodium 143, potassium 3.9, chloride 10
7, carbon dioxide 32, BUN 20, creatinine 1.11, glucose 140, calcium 8.3, magnesium 2.4.  LFT:  AST 13
, ALT 16, alkaline phosphatase 59, albumin 2.9.  Cardiac enzymes negative x2 and third one is 0.029, 
.9, lipase 14.  Urinalysis; leukocyte esterase moderate.

 

ASSESSMENT AND PLAN/IMPRESSION:

1.  Dyspnea, multifactorial etiology.  Patient has underlying morbid obesity, physical deconditioning
, obstructive sleep apnea, chronic obstructive pulmonary disease, chronic diastolic heart failure, bu
t at this point, this looks like to me chronic and baseline.  He is noncompliant with his CPAP allyson
e and he does not have any volume overload status as well and he does not appear to be in COPD flareu
p as well.  His condition seems like baseline.  He need some PT, OT, and eventually he will go back t
o nursing home.

2.  Morbid obesity and obstructive sleep apnea.  He is noncompliant with the CPAP machine.  He has co
ntinued to complain of dyspnea and he has recurrent admission in hospital.  At this point, I will con
sult Palliative Care for goal of care.

3.  Recent admission for urinary tract infection, sepsis and bacteremia.  The patient will continue R
ocephin 2 gram IV daily for another 8-10 days as per previous recommendations.

4.  Chronic obstructive pulmonary disease.  We will continue DuoNeb q.4 hourly and Dulera two puffs i
nhalation b.i.d.

5.  Chronic diastolic heart failure.  We will continue Lasix 40 mg p.o. daily.

6.  Hypertension.  We will continue his home medication of lisinopril 10 mg p.o. at bedtime, Coreg 6.
25 mg p.o. b.i.d., amlodipine 5 mg p.o. daily.

7.  Benign enlargement of prostate.  Continue Avodart 0.5 mg p.o. daily, Flomax 0.4 mg p.o. daily.

8.  Gastroesophageal reflux disease.  We will continue Protonix 40 mg p.o. daily.

9.  Dyslipidemia.  We will continue Zocor 20 mg p.o. at bedtime.

10.  Deep venous thrombosis prophylaxis, sequential compression device boots.

11.  Gastrointestinal prophylaxis, Protonix 40 mg p.o. daily.

 

CODE STATUS:  The patient is DNR that is confirmed with the patient and the patient also has out of h
ospital DNR paperwork.

 

Disposition plan based on clinical course.  While in [facility], Pulmonology will see as per protocol treat
ment.

## 2018-07-02 NOTE — RAD
PORTABLE CHEST 1 VIEW:

 

DATE: 7/2/18.

TIME: 1:58 a.m.

 

HISTORY: 

Shortness of breath.

 

FINDINGS: 

Comparison is made with the exam of 3/26/18.

 

The heart is at upper limits of normal.  The left-sided pacing device remains in place.  There is a r
ight upper extremity PICC line with tip in the projection of the SVC.  There is elevation of the righ
t hemidiaphragm with mild opacity in the right lung base.  A right effusion may be present.  There ar
e degenerative changes in the shoulder joints.

 

POS: [facility]

## 2018-07-03 VITALS — DIASTOLIC BLOOD PRESSURE: 63 MMHG | SYSTOLIC BLOOD PRESSURE: 143 MMHG

## 2018-07-03 VITALS — TEMPERATURE: 98.1 F

## 2018-07-03 RX ADMIN — CEFTRIAXONE SCH MLS: 2 INJECTION, POWDER, FOR SOLUTION INTRAMUSCULAR; INTRAVENOUS at 06:51

## 2018-07-03 RX ADMIN — Medication SCH ML: at 08:55

## 2018-07-03 RX ADMIN — MOMETASONE FUROATE AND FORMOTEROL FUMARATE DIHYDRATE SCH PUFF: 200; 5 AEROSOL RESPIRATORY (INHALATION) at 07:36

## 2018-07-03 NOTE — PRG
DATE OF SERVICE:  07/03/2018

 

SERVICE:  Pulmonary Medicine

 

INTERVAL HISTORY:  The patient is doing fine from a respiratory standpoint.  He is breathing comforta
ramona.  His biggest complaint this morning is that he just had a bowel movement in bed.  He is waiting 
to be cleaned up.  The nurses are actively put getting supplies together and he has told me 4 times n
ow that he needs to be cleaned up this moment.

 

PHYSICAL EXAMINATION:

VITAL SIGNS:  Afebrile, pulse 63, blood pressure 150/71, respirations 20, saturation 95% on 2 liters 
nasal cannula.  That being said, cannula are not currently in his mouth or nose. 

HEENT:  Normocephalic, atraumatic.  Sclerae are white, conjunctivae pink.  Oral mucosa is moist witho
ut lesions.

LUNGS:  Decent air entry.  There is no prolonged expiratory phase or wheezing present.

HEART:  Normal rate, regular.

ABDOMEN:  Soft, nontender, nondistended.  Bowel sounds are positive.

MUSCULOSKELETAL:  No cyanosis or clubbing.  No pitting in the bilateral lower extremities.

NEUROLOGIC:  Grossly nonfocal.

 

ASSESSMENT:

1.  Severe sepsis, resolved.

2.  Bacteremia secondary to Escherichia coli.

3.  Urinary tract infection secondary to Escherichia coli and Proteus.

4.  Chronic hypoxic and hypercapnic respiratory failure without acute component.

5.  Chronic urine retention with indwelling Sommer catheter.

6.  Deconditioning, severe.

7.  Osteoarthritis, severe.

8.  Morbid obesity.

 

DISCUSSION AND PLAN:  The patient is doing great from a respiratory perspective.  From my standpoint,
 he can go back to the medical unit, or to his nursing facility based on [facility] preference.  I aron
l continue to follow as long as he remains in this location.  Please call with additional questions o
r concerns moving forward.

## 2018-07-03 NOTE — DIS
DATE OF ADMISSION:  07/02/2018

 

DATE OF DISCHARGE:  07/03/2018

 

PRIMARY CARE PHYSICIAN:  Dr. Norris.

 

DISCHARGE DISPOSITION:  Nursing home.

 

PRIMARY DISCHARGE DIAGNOSES:  Acute on chronic respiratory failure with hypoxia due to noncompliance 
with CPAP use; dyspnea, multifactorial.

 

SECONDARY DISCHARGE DIAGNOSES:  Recent Escherichia coli bacteremia; anemia, normocytic-normochromic; 
benign enlargement of prostate; coronary artery disease; chronic diastolic heart failure; dyslipidemi
a; history of sick sinus syndrome; hypertension; morbid obesity; obstructive sleep apnea; osteoarthri
tis; noncompliance with the CPAP machine.

 

CONTRAINDICATIONS:  None.

 

CODE STATUS:  DNR.

 

INPATIENT CONSULTANTS:  Dr. Guerra saw this patient, because he was in [facility].

 

TEST RESULTS PENDING ON DISCHARGE:  None.

 

ALLERGIES:  No known drug allergy.

 

DISCHARGE PLAN:  Post hospital, the patient will follow up with primary care physician.

 

SIGNIFICANT LABORATORY DATA:  Chest x-ray was unremarkable.  WBC 6.2, hemoglobin 10.2, platelets 190.
  Sodium 143, creatinine 1.11.  LFT normal.  Albumin 2.9.  Cardiac enzymes unremarkable.  .  U
rinalysis:  Leukocyte esterase moderate.  Urine culture negative.

 

DISCHARGE MEDICATIONS:  The patient will finish his Rocephin 2 gram IV daily through PICC line as per
 previous order of 10 days.

 

Continue following medications:  Tylenol #3 one tablet q.6 hourly p.r.n., amlodipine 5 mg p.o. daily,
 aspirin 81 mg p.o. daily, Dulcolax 10 mg rectally daily, Coreg 6.25 mg p.o. b.i.d., Colace 100 mg p.
o. daily p.r.n., Avodart 0.5 mg p.o. daily, ferrous sulfate 325 mg p.o. b.i.d., Proscar 5 mg p.o. mendel
ly, Lasix 40 mg p.o. daily, Mucinex 1200 mg twice daily, DuoNeb q.4 hourly p.r.n., lisinopril 10 mg p
.o. at bedtime, Dulera 2 puffs inhalation b.i.d., multivitamin 1 tablet p.o. daily, Protonix 40 mg p.
o. b.i.d., Zocor 20 mg p.o. daily, and Flomax 0.4 mg p.o. daily.

 

Discharge plan, post hospital, the patient will be discharged back to nursing home.

 

HOSPITAL COURSE:  This is a 72-year-old male, who was fully treated while in the hospital recently an
d he was discharged to nursing home where he was found with hypoxia, which he has chronic hypoxia and
 he was not using his CPAP machine for his sleep apnea, and during nighttime, patient's nurse found h
ypoxia and that is why patient was directed to ER and he was admitted to IMCU.  He was treated with B
iPAP while in hospital.  There was nothing to do during this admission.  He continued with his IV ant
ibiotic therapy while in the hospital.  We observed him for 24 hours.  He remained stable hemodynamic
ally.  We consulted palliative care for his recurrent admission.  At this point, this patient needs t
o be consulted for hospice at nursing home.  As long as patient is compliant with the CPAP machine, h
e should be fine, but if he is not using CPAP machine at nursing home, then primary care physician in
 nursing home should consider hospice evaluation there rather than sending back and forth to the hosp
ital.  At this point, the patient is hemodynamically stable.  He will finish antibiotic course there 
at nursing home.  Once antibiotic course is finished, then the PICC line can be removed.

 

PHYSICAL EXAMINATION:  The patient is seen and examined at bedside today.

VITAL SIGNS:  Currently, temperature 98.1, pulse 63, respiratory rate 20, saturation 95% on 2 liter, 
blood pressure 143/63, weight 247 pounds.

GENERAL:  The patient is alert, awake, in no obvious acute distress.

HEAD:  Normocephalic, atraumatic.

LUNGS:  Clear to auscultation without any rhonchi or rales.

CARDIAC:  S1 and S2 appears regular without any significant murmur.

ABDOMEN:  Soft and benign.

EXTREMITIES:  No edema.

NEUROLOGIC:  Grossly nonfocal examination.

 

Paper work for discharge done.  Discharge medication reconciliation done.

## 2018-07-07 NOTE — EKG
Test Reason : 

Blood Pressure : ***/*** mmHG

Vent. Rate : 064 BPM     Atrial Rate : 064 BPM

   P-R Int : 164 ms          QRS Dur : 144 ms

    QT Int : 448 ms       P-R-T Axes : 080 132 066 degrees

   QTc Int : 462 ms

 

Electronic atrial pacemaker

Right bundle branch block

Left posterior fascicular block

*** Bifascicular block ***

Abnormal ECG

 

Confirmed by MIS CERON, SCOTTY HANCOCK ([id number]),  DEVENDRA DOTY ([id number]) on 7/7/2018 12:04:48 PM

 

Referred By:             Confirmed By:SCOTTY ABEBE MD

## 2019-05-22 NOTE — PRG
DATE OF SERVICE:  03/22/2018

 

SERVICE:  Pulmonary Medicine.

 

INTERVAL HISTORY:  The patient is doing outstanding from a respiratory 
standpoint.  Yesterday, he is obtunded.  They were talking about intubation, 
possible bronchoscopy.  This morning, he is awake and alert.  He is perfectly 
comfortable.  He has no difficulty with his breathing.  Otherwise, there has 
been no interval change to his condition.  We are awaiting culture results.  I 
really do not think infectious thing brought him to the hospital.

 

PHYSICAL EXAMINATION:

VITAL SIGNS:  Afebrile, pulse 67, blood pressure 122/52, respirations 19, 
saturation 92% on 3 liters nasal cannula.

GENERAL:  The patient is awake, alert, no apparent distress.

LUNGS:  Excellent air entry.  Slightly prolonged expiratory phase is present.  
There is some wheezing.  Dependent crackles are not present.  No rhonchi 
currently.

HEART:  Normal rate and regular.

ABDOMEN:  Soft, nontender, and nondistended.  Bowel sounds are positive.

MUSCULOSKELETAL:  No cyanosis or clubbing.  There is trace to 1+ pitting in the 
bilateral lower extremities.

GENITOURINARY:  Sommer catheter in place.

NEUROLOGIC:  Grossly nonfocal.

 

LABORATORY DATA:  WBC 3.0, hemoglobin 10.0, platelets 160,000.  Neutrophil 
count is 85%.  Bands are low at 2%.  PH 7.34, pCO2 66, pO2 63.  Basic metabolic 
profile and liver function studies are essentially unremarkable.  ALT and 
alkaline phosphatase returned to normal limits.  .  TSH 0.36.  Troponin 
is at baseline of 0.038.  Creatinine 0.88.  Urinalysis is mostly unremarkable.  
Nitrites are negative.  Blood cultures x2 remain unremarkable.

 

ASSESSMENT:

1.  Acute on chronic hypoxic and hypercapnic respiratory failure, at baseline 
now.

2.  Metabolic encephalopathy.  

3.  Chronic diastolic heart failure.

4.  Acute blood loss anemia secondary to peptic ulcer disease, stable.

5.  Osteoarthritis of the bilateral hips, severe.

6.  Debility, severe.

 

PLAN:  Since the patient has returned to his usual state, we can initiate 
physical therapy.  We will try to get him into a chair as often as possible.  
We will continue to follow up the culture results.  If anything returns, 
antibiotics will be considered.  The patient had a significant recovery in 
function overnight with essentially no intervention except BiPAP.  This will be 
continued at night on a p.r.n. basis.  He will remain in the IMCU for another 
24 hours.

 

ALLIE How Did Your Itching Occur?: sudden in onset (over a period of weeks to a few months) How Severe Is Your Itching?: mild

## 2023-04-23 NOTE — RAD
LEFT LEG TWO VIEWS:

3/15/18

 

HISTORY: 

Fall. Left leg pain.

 

FINDINGS/IMPRESSION:   

The tibia and fibula appear intact. There is a well corticated bony density inferior to the medial ma
lleolar, likely old avulsion injury.

 

POS: [employer]